# Patient Record
Sex: MALE | Race: WHITE | NOT HISPANIC OR LATINO | Employment: FULL TIME | URBAN - METROPOLITAN AREA
[De-identification: names, ages, dates, MRNs, and addresses within clinical notes are randomized per-mention and may not be internally consistent; named-entity substitution may affect disease eponyms.]

---

## 2018-08-27 ENCOUNTER — OFFICE VISIT (OUTPATIENT)
Dept: DERMATOLOGY | Facility: CLINIC | Age: 68
End: 2018-08-27
Payer: COMMERCIAL

## 2018-08-27 DIAGNOSIS — L81.4 LENTIGO: ICD-10-CM

## 2018-08-27 DIAGNOSIS — D18.00 ANGIOMA: ICD-10-CM

## 2018-08-27 DIAGNOSIS — L72.0 EPIDERMAL CYST: ICD-10-CM

## 2018-08-27 DIAGNOSIS — D48.5 NEOPLASM OF UNCERTAIN BEHAVIOR OF SKIN: Primary | ICD-10-CM

## 2018-08-27 DIAGNOSIS — L82.1 SK (SEBORRHEIC KERATOSIS): ICD-10-CM

## 2018-08-27 PROCEDURE — 88305 TISSUE EXAM BY PATHOLOGIST: CPT | Performed by: PATHOLOGY

## 2018-08-27 PROCEDURE — 11100 PR BIOPSY OF SKIN LESION: CPT | Mod: S$GLB,,, | Performed by: DERMATOLOGY

## 2018-08-27 PROCEDURE — 99999 PR PBB SHADOW E&M-EST. PATIENT-LVL III: CPT | Mod: PBBFAC,,, | Performed by: DERMATOLOGY

## 2018-08-27 PROCEDURE — 99203 OFFICE O/P NEW LOW 30 MIN: CPT | Mod: 25,S$GLB,, | Performed by: DERMATOLOGY

## 2018-08-27 PROCEDURE — 88342 IMHCHEM/IMCYTCHM 1ST ANTB: CPT | Mod: 26,,, | Performed by: PATHOLOGY

## 2018-08-27 NOTE — PROGRESS NOTES
"  Subjective:       Patient ID:  David Solomon is a 68 y.o. male who presents for   Chief Complaint   Patient presents with    Skin Check    Lesion     Pt here today for a UBSE. Pt c/o brown lesions on scalp x a few months. No bleeding, pain . tx with polysporin and did not resolve. Pt's wife wanted him to get it checked. Feels it started after trauma from a branch    Patient is here today for a "mole" check.   Pt has a history of normal sun exposure in the past.   Pt recalls several blistering sunburns in the past- no  Pt has history of tanning bed use- no  Pt has had moles removed in the past- no  Pt has history of melanoma in first degree relatives- no          Review of Systems   Skin: Positive for activity-related sunscreen use. Negative for daily sunscreen use, tendency to form keloidal scars and recent sunburn.   Hematologic/Lymphatic: Does not bruise/bleed easily.        Objective:    Physical Exam   Constitutional: He appears well-developed and well-nourished. No distress.   Neurological: He is alert and oriented to person, place, and time. He is not disoriented.   Psychiatric: He has a normal mood and affect.   Skin:   Areas Examined (abnormalities noted in diagram):   Scalp / Hair Palpated and Inspected  Head / Face Inspection Performed  Neck Inspection Performed  Chest / Axilla Inspection Performed  Abdomen Inspection Performed  Back Inspection Performed  RUE Inspected  LUE Inspection Performed  Nails and Digits Inspection Performed                   Diagram Legend     Erythematous scaling macule/papule c/w actinic keratosis       Vascular papule c/w angioma      Pigmented verrucoid papule/plaque c/w seborrheic keratosis      Yellow umbilicated papule c/w sebaceous hyperplasia      Irregularly shaped tan macule c/w lentigo     1-2 mm smooth white papules consistent with Milia      Movable subcutaneous cyst with punctum c/w epidermal inclusion cyst      Subcutaneous movable cyst c/w pilar cyst      Firm " pink to brown papule c/w dermatofibroma      Pedunculated fleshy papule(s) c/w skin tag(s)      Evenly pigmented macule c/w junctional nevus     Mildly variegated pigmented, slightly irregular-bordered macule c/w mildly atypical nevus      Flesh colored to evenly pigmented papule c/w intradermal nevus       Pink pearly papule/plaque c/w basal cell carcinoma      Erythematous hyperkeratotic cursted plaque c/w SCC      Surgical scar with no sign of skin cancer recurrence      Open and closed comedones      Inflammatory papules and pustules      Verrucoid papule consistent consistent with wart     Erythematous eczematous patches and plaques     Dystrophic onycholytic nail with subungual debris c/w onychomycosis     Umbilicated papule    Erythematous-base heme-crusted tan verrucoid plaque consistent with inflamed seborrheic keratosis     Erythematous Silvery Scaling Plaque c/w Psoriasis     See annotation          Assessment / Plan:      Pathology Orders:     Normal Orders This Visit    Tissue Specimen To Pathology, Dermatology     Questions:    Directional Terms:  Other(comment)    Clinical information:  r/o sk v atypical lentigo    Specific Site:  mid scalp        Neoplasm of uncertain behavior of skin  Shave biopsy procedure note:    Shave biopsy performed after verbal consent including risk of infection, scar, recurrence, need for additional treatment of site. Area prepped with alcohol, anesthetized with approximately 1.0cc of 1% lidocaine with epinephrine. Lesional tissue shaved with razor blade. Hemostasis achieved with application of aluminum chloride followed by hyfrecation. No complications. Dressing applied. Wound care explained.      -     Tissue Specimen To Pathology, Dermatology    Lentigo  This is a benign hyperpigmented sun induced lesion. Daily sun protection will reduce the number of new lesions. Treatment of these benign lesions are considered cosmetic.  The nature of sun-induced photo-aging and skin  cancers is discussed.  Sun avoidance, protective clothing, and the use of 30-SPF sunscreens is advised. Observe closely for skin damage/changes, and call if such occurs.    Angioma  These are benign vascular lesions that are inherited.  Treatment is not necessary.    SK (seborrheic keratosis)  These are benign inherited growths without a malignant potential. Reassurance given to patient. No treatment is necessary.     Epidermal cyst    Reassurance given to patient. No treatment is necessary.   Treatment of benign, asymptomatic lesions may be considered cosmetic.    Upper body skin examination performed today including at least 6 points as noted in physical examination. Suspicious lesions noted.           Follow-up for prn bx report.

## 2019-10-17 ENCOUNTER — OCCUPATIONAL HEALTH (OUTPATIENT)
Dept: URGENT CARE | Facility: CLINIC | Age: 69
End: 2019-10-17

## 2019-10-17 PROCEDURE — 90686 IIV4 VACC NO PRSV 0.5 ML IM: CPT | Mod: S$GLB,,, | Performed by: PREVENTIVE MEDICINE

## 2019-10-17 PROCEDURE — 90686 FLU VACCINE (QUAD) GREATER THAN OR EQUAL TO 3YO PRESERVATIVE FREE IM: ICD-10-PCS | Mod: S$GLB,,, | Performed by: PREVENTIVE MEDICINE

## 2020-05-25 ENCOUNTER — OFFICE VISIT (OUTPATIENT)
Dept: PAIN MEDICINE | Facility: CLINIC | Age: 70
End: 2020-05-25
Payer: COMMERCIAL

## 2020-05-25 ENCOUNTER — HOSPITAL ENCOUNTER (OUTPATIENT)
Dept: RADIOLOGY | Facility: HOSPITAL | Age: 70
Discharge: HOME | End: 2020-05-25
Attending: PAIN MEDICINE
Payer: COMMERCIAL

## 2020-05-25 VITALS
BODY MASS INDEX: 24.66 KG/M2 | HEART RATE: 74 BPM | WEIGHT: 167 LBS | DIASTOLIC BLOOD PRESSURE: 83 MMHG | SYSTOLIC BLOOD PRESSURE: 179 MMHG

## 2020-05-25 DIAGNOSIS — G89.29 CHRONIC PAIN OF LEFT KNEE: Primary | ICD-10-CM

## 2020-05-25 DIAGNOSIS — M22.2X2 PATELLOFEMORAL SYNDROME OF LEFT KNEE: ICD-10-CM

## 2020-05-25 DIAGNOSIS — G89.29 CHRONIC PAIN OF LEFT KNEE: ICD-10-CM

## 2020-05-25 DIAGNOSIS — M25.562 CHRONIC PAIN OF LEFT KNEE: ICD-10-CM

## 2020-05-25 DIAGNOSIS — M25.562 CHRONIC PAIN OF LEFT KNEE: Primary | ICD-10-CM

## 2020-05-25 PROCEDURE — 99204 OFFICE O/P NEW MOD 45 MIN: CPT | Mod: S$GLB,,, | Performed by: PAIN MEDICINE

## 2020-05-25 PROCEDURE — 73564 XR KNEE COMP 4 OR MORE VIEWS BILAT: ICD-10-PCS | Mod: 26,,, | Performed by: RADIOLOGY

## 2020-05-25 PROCEDURE — 1159F PR MEDICATION LIST DOCUMENTED IN MEDICAL RECORD: ICD-10-PCS | Mod: S$GLB,,, | Performed by: PAIN MEDICINE

## 2020-05-25 PROCEDURE — 1159F MED LIST DOCD IN RCRD: CPT | Mod: S$GLB,,, | Performed by: PAIN MEDICINE

## 2020-05-25 PROCEDURE — 73564 X-RAY EXAM KNEE 4 OR MORE: CPT | Mod: TC,50,FY

## 2020-05-25 PROCEDURE — 1125F AMNT PAIN NOTED PAIN PRSNT: CPT | Mod: S$GLB,,, | Performed by: PAIN MEDICINE

## 2020-05-25 PROCEDURE — 99999 PR PBB SHADOW E&M-EST. PATIENT-LVL III: ICD-10-PCS | Mod: PBBFAC,,, | Performed by: PAIN MEDICINE

## 2020-05-25 PROCEDURE — 73564 X-RAY EXAM KNEE 4 OR MORE: CPT | Mod: 26,,, | Performed by: RADIOLOGY

## 2020-05-25 PROCEDURE — 1125F PR PAIN SEVERITY QUANTIFIED, PAIN PRESENT: ICD-10-PCS | Mod: S$GLB,,, | Performed by: PAIN MEDICINE

## 2020-05-25 PROCEDURE — 99204 PR OFFICE/OUTPT VISIT, NEW, LEVL IV, 45-59 MIN: ICD-10-PCS | Mod: S$GLB,,, | Performed by: PAIN MEDICINE

## 2020-05-25 PROCEDURE — 99999 PR PBB SHADOW E&M-EST. PATIENT-LVL III: CPT | Mod: PBBFAC,,, | Performed by: PAIN MEDICINE

## 2020-05-25 NOTE — PROGRESS NOTES
[facility] New Patient Evaluation    Referred by: Self  Reason for referral: Knee pain    CC:   Chief Complaint   Patient presents with    Knee Pain     Last 3 PDI Scores 5/25/2020   Pain Disability Index (PDI) 35       HPI:   David Solomon is a 70 y.o. male who complains of intermittent left knee pain associated with flexion near 150 degrees.  He reports being very active- cycling, running, stair-climbing, etc- with mild irritation in the knee most mornings.  The knee pain is worse with flexion of the knee, especially when stretching.  Aside from the current left knee pain, he has a history of traumatic injury tot he right knee in 2018 which is not causing him an problems; the injury was mostly a superficial laceration.    Location: Left Knee   Onset: couple of years  Current Pain Score: 5/10  Daily Pain of Range: 0-3/10  Quality: Aching  Radiation: none  Worsened by: flexion  Improved by: nothing    Previous Therapies:  PT/OT: Denies  HEP:   Interventions: Denies  Surgery: Denies  Medications:   - NSAIDS:   - MSK Relaxants:   - TCAs:   - SNRIs:   - Topicals:   - Anticonvulsants:  - Opioids:     Current Pain Medications:  1. none     Review of Systems:  Review of Systems   Constitutional: Negative for chills and fever.   HENT: Negative for nosebleeds.    Eyes: Negative for blurred vision and pain.   Respiratory: Negative for cough, hemoptysis, sputum production, shortness of breath and wheezing.    Cardiovascular: Negative for chest pain and palpitations.   Gastrointestinal: Negative for heartburn, nausea and vomiting.   Genitourinary: Negative for dysuria and hematuria.   Musculoskeletal: Positive for joint pain. Negative for falls and myalgias.   Skin: Negative for rash.   Neurological: Negative for tingling, sensory change, focal weakness, seizures, loss of consciousness and weakness.   Endo/Heme/Allergies: Does not bruise/bleed easily.   Psychiatric/Behavioral: Negative for hallucinations.        History:    Current Outpatient Medications:     fosinopril (MONOPRIL) 40 MG tablet, , Disp: , Rfl:     insulin glargine (LANTUS) 100 unit/mL injection, Inject 23 Units into the skin every evening., Disp: , Rfl:     insulin glulisine U-100 (APIDRA SOLOSTAR U-100 INSULIN) 100 unit/mL InPn pen, , Disp: , Rfl:     triamterene-hydrochlorothiazide 37.5-25 mg (DYAZIDE) 37.5-25 mg per capsule, , Disp: , Rfl:     Past Medical History:   Diagnosis Date    Diabetes mellitus     Hypertension        No past surgical history on file.    Family History   Problem Relation Age of Onset    Melanoma Neg Hx        Social History     Socioeconomic History    Marital status:      Spouse name: Not on file    Number of children: Not on file    Years of education: Not on file    Highest education level: Not on file   Occupational History    Not on file   Social Needs    Financial resource strain: Not on file    Food insecurity:     Worry: Not on file     Inability: Not on file    Transportation needs:     Medical: Not on file     Non-medical: Not on file   Tobacco Use    Smoking status: Never Smoker    Smokeless tobacco: Never Used   Substance and Sexual Activity    Alcohol use: No    Drug use: No    Sexual activity: Not on file   Lifestyle    Physical activity:     Days per week: Not on file     Minutes per session: Not on file    Stress: Not on file   Relationships    Social connections:     Talks on phone: Not on file     Gets together: Not on file     Attends Latter-day service: Not on file     Active member of club or organization: Not on file     Attends meetings of clubs or organizations: Not on file     Relationship status: Not on file   Other Topics Concern    Not on file   Social History Narrative    Not on file       Review of patient's allergies indicates:  No Known Allergies    Physical Exam:  Vitals:    05/25/20 0818   Weight: 75.8 kg (167 lb)   PainSc:   5     General    Nursing note and  vitals reviewed.  Constitutional: He is oriented to person, place, and time. He appears well-developed and well-nourished. No distress.   HENT:   Head: Normocephalic and atraumatic.   Nose: Nose normal.   Eyes: Conjunctivae and EOM are normal. Pupils are equal, round, and reactive to light. Right eye exhibits no discharge. Left eye exhibits no discharge. No scleral icterus.   Neck: No JVD present.   Cardiovascular: Intact distal pulses.    Pulmonary/Chest: Effort normal. No respiratory distress.   Abdominal: He exhibits no distension.   Neurological: He is alert and oriented to person, place, and time. Coordination normal.   Psychiatric: He has a normal mood and affect. His behavior is normal. Judgment and thought content normal.     General Musculoskeletal Exam   Gait: normal       Right Knee Exam     Inspection   Scars: present    Left Knee Exam     Inspection   Erythema: absent  Scars: absent  Swelling: absent  Deformity: absent  Bruising: absent    Tenderness   Left knee tenderness location: non-tender throughout.    Crepitus   Left knee crepitus location: none.    Range of Motion   The patient has normal left knee ROM.  Extension: normal   Flexion: normal Left knee flexion: pain noted with flexion to 150+ degress with lunging stretch.     Tests   Patella   Passive Patellar Tilt: neutral  Patellar Tracking: normal  Patellar Grind: negative    Muscle Strength   Left Lower Extremity   Hip Abduction: 5/5   Quadriceps:  5/5   Hamstrin/5       Imaging:  None relevant to review at this time.      Labs:  BMP  Lab Results   Component Value Date     2017    K 3.6 2017     2017    CO2 30 (H) 2017    BUN 11 2017    CREATININE 1.16 2017    CALCIUM 9.5 2017    ANIONGAP 12 2017    ESTGFRAFRICA >60.0 2017    EGFRNONAA >60.0 2017     Lab Results   Component Value Date    ALT 31 2017    AST 30 2017    ALKPHOS 82 2017    BILITOT 0.8  03/17/2017       Assessment & Plan:  Problem List Items Addressed This Visit     Chronic pain of left knee - Primary    Relevant Orders    X-Ray Knee Complete 4 Or More Views Bilat    Patellofemoral syndrome of left knee          5/25/20 - David Solomon is a 70 y.o. male with chronic, mild, intermittent left knee pain in the region of the patella.  This may be an early sign of patellofemoral syndrome or arthritis, though I do not think interventions or oral medications would be appropriate at ths time given the minimal nature of the pain.  I have recommended an demonstrated a series of stretches for the quadriceps muscle and encouraged him to use a pre-existing prescription of diclofenac gel around the patella p.r.n..  I also ordered xrays of the knees today; bilateral xray was ordered to enable comparison of the left vs right.    Follow Up: RTC as needed    Arnold Gonsalves Jr, MD  Interventional Pain Medicine / Anesthesiology    Disclaimer: This note was partly generated using dictation software which may occasionally result in transcription errors.

## 2021-03-05 ENCOUNTER — IMMUNIZATION (OUTPATIENT)
Dept: FAMILY MEDICINE | Facility: CLINIC | Age: 71
End: 2021-03-05
Payer: COMMERCIAL

## 2021-03-05 DIAGNOSIS — Z23 NEED FOR VACCINATION: Primary | ICD-10-CM

## 2021-03-05 PROCEDURE — 91300 COVID-19, MRNA, LNP-S, PF, 30 MCG/0.3 ML DOSE VACCINE: CPT | Mod: PBBFAC | Performed by: NURSE ANESTHETIST, CERTIFIED REGISTERED

## 2021-03-26 ENCOUNTER — IMMUNIZATION (OUTPATIENT)
Dept: FAMILY MEDICINE | Facility: CLINIC | Age: 71
End: 2021-03-26
Payer: MEDICARE

## 2021-03-26 DIAGNOSIS — Z23 NEED FOR VACCINATION: Primary | ICD-10-CM

## 2021-03-26 PROCEDURE — 0002A COVID-19, MRNA, LNP-S, PF, 30 MCG/0.3 ML DOSE VACCINE: CPT | Mod: PBBFAC | Performed by: FAMILY MEDICINE

## 2021-03-26 PROCEDURE — 91300 COVID-19, MRNA, LNP-S, PF, 30 MCG/0.3 ML DOSE VACCINE: CPT | Mod: PBBFAC | Performed by: FAMILY MEDICINE

## 2021-04-14 ENCOUNTER — IMMUNIZATION (OUTPATIENT)
Dept: URGENT CARE | Facility: CLINIC | Age: 71
End: 2021-04-14
Payer: MEDICARE

## 2021-04-14 DIAGNOSIS — Z23 NEED FOR TD VACCINE: Primary | ICD-10-CM

## 2021-04-14 PROCEDURE — 90714 TD VACC NO PRESV 7 YRS+ IM: CPT | Mod: S$GLB,,, | Performed by: PHYSICIAN ASSISTANT

## 2021-04-14 PROCEDURE — 90714 TD VACCINE GREATER THAN OR EQUAL TO 7YO WITH PRESERVATIVE IM: ICD-10-PCS | Mod: S$GLB,,, | Performed by: PHYSICIAN ASSISTANT

## 2021-04-14 PROCEDURE — 90471 IMMUNIZATION ADMIN: CPT | Mod: S$GLB,,, | Performed by: PHYSICIAN ASSISTANT

## 2021-04-14 PROCEDURE — 90471 TD VACCINE GREATER THAN OR EQUAL TO 7YO WITH PRESERVATIVE IM: ICD-10-PCS | Mod: S$GLB,,, | Performed by: PHYSICIAN ASSISTANT

## 2022-01-13 ENCOUNTER — OFFICE VISIT (OUTPATIENT)
Dept: OPHTHALMOLOGY | Facility: CLINIC | Age: 72
End: 2022-01-13
Payer: MEDICARE

## 2022-01-13 ENCOUNTER — TELEPHONE (OUTPATIENT)
Dept: OPHTHALMOLOGY | Facility: CLINIC | Age: 72
End: 2022-01-13
Payer: MEDICARE

## 2022-01-13 DIAGNOSIS — E11.3393 MODERATE NONPROLIFERATIVE DIABETIC RETINOPATHY OF BOTH EYES WITHOUT MACULAR EDEMA ASSOCIATED WITH TYPE 2 DIABETES MELLITUS: ICD-10-CM

## 2022-01-13 DIAGNOSIS — Z13.9 SCREENING FOR UNSPECIFIED CONDITION: Primary | ICD-10-CM

## 2022-01-13 DIAGNOSIS — H25.11 NUCLEAR SCLEROTIC CATARACT OF RIGHT EYE: Primary | ICD-10-CM

## 2022-01-13 DIAGNOSIS — H25.12 NUCLEAR SCLEROTIC CATARACT OF LEFT EYE: ICD-10-CM

## 2022-01-13 PROCEDURE — 92136 OPHTHALMIC BIOMETRY: CPT | Mod: RT,S$GLB,, | Performed by: OPHTHALMOLOGY

## 2022-01-13 PROCEDURE — 92136 IOL MASTER - OD - RIGHT EYE: ICD-10-PCS | Mod: RT,S$GLB,, | Performed by: OPHTHALMOLOGY

## 2022-01-13 PROCEDURE — 99999 PR PBB SHADOW E&M-EST. PATIENT-LVL II: ICD-10-PCS | Mod: PBBFAC,,, | Performed by: OPHTHALMOLOGY

## 2022-01-13 PROCEDURE — 99204 PR OFFICE/OUTPT VISIT, NEW, LEVL IV, 45-59 MIN: ICD-10-PCS | Mod: S$GLB,,, | Performed by: OPHTHALMOLOGY

## 2022-01-13 PROCEDURE — 92134 CPTRZ OPH DX IMG PST SGM RTA: CPT | Mod: S$GLB,,, | Performed by: OPHTHALMOLOGY

## 2022-01-13 PROCEDURE — 99999 PR PBB SHADOW E&M-EST. PATIENT-LVL II: CPT | Mod: PBBFAC,,, | Performed by: OPHTHALMOLOGY

## 2022-01-13 PROCEDURE — 1126F PR PAIN SEVERITY QUANTIFIED, NO PAIN PRESENT: ICD-10-PCS | Mod: CPTII,S$GLB,, | Performed by: OPHTHALMOLOGY

## 2022-01-13 PROCEDURE — 99204 OFFICE O/P NEW MOD 45 MIN: CPT | Mod: S$GLB,,, | Performed by: OPHTHALMOLOGY

## 2022-01-13 PROCEDURE — 92134 POSTERIOR SEGMENT OCT RETINA (OCULAR COHERENCE TOMOGRAPHY)-BOTH EYES: ICD-10-PCS | Mod: S$GLB,,, | Performed by: OPHTHALMOLOGY

## 2022-01-13 PROCEDURE — 1126F AMNT PAIN NOTED NONE PRSNT: CPT | Mod: CPTII,S$GLB,, | Performed by: OPHTHALMOLOGY

## 2022-01-13 PROCEDURE — 1159F PR MEDICATION LIST DOCUMENTED IN MEDICAL RECORD: ICD-10-PCS | Mod: CPTII,S$GLB,, | Performed by: OPHTHALMOLOGY

## 2022-01-13 PROCEDURE — 1159F MED LIST DOCD IN RCRD: CPT | Mod: CPTII,S$GLB,, | Performed by: OPHTHALMOLOGY

## 2022-01-13 RX ORDER — PREDNISOLONE ACETATE-GATIFLOXACIN-BROMFENAC .75; 5; 1 MG/ML; MG/ML; MG/ML
1 SUSPENSION/ DROPS OPHTHALMIC 3 TIMES DAILY
Qty: 5 ML | Refills: 3 | Status: SHIPPED | OUTPATIENT
Start: 2022-01-13 | End: 2022-02-28 | Stop reason: SDUPTHER

## 2022-01-13 NOTE — PROGRESS NOTES
HPI     Cataract      Additional comments: Cataracts OU               Comments     Kimberly Solomon - did surgery on her 1-2 years. - Patients wife. / retina   specialist Dr. Zapata    Laser- Diabetic Retinopathy 20+ years ago OU  Cataracts OU   Diabetic retinopathy   Family History of glaucoma- Mother  SLT OU    Patient here for a cataract evaluation in both eyes, was told a while back   that he had cataracts by his retina specialist Dr. Zapata. Patients wife had   surgery with you 1-2 years ago, wanted to keep care with you. Vision had a   vail/film and things appear to be darker than before, night vision has   decreased and not as vivid anymore. Glare is bothersome     B.S controlled A1C has been below 7 for the past couple of years, has been   doing good.           Last edited by Divine Ricketts MD on 1/13/2022  1:52 PM. (History)            Assessment /Plan     For exam results, see Encounter Report.    Nuclear sclerotic cataract of right eye  -     prednisolon/gatiflox/bromfenac (PREDNISOL ACE-GATIFLOX-BROMFEN) 1-0.5-0.075 % DrpS; Apply 1 drop to eye 3 (three) times daily. Start 2 days before surgery in the operative eye, Three times a day.  Dispense: 5 mL; Refill: 3  -     IOL Master - OD - Right Eye    Nuclear sclerotic cataract of left eye    Moderate nonproliferative diabetic retinopathy of both eyes without macular edema associated with type 2 diabetes mellitus  -     Posterior Segment OCT Retina-Both eyes      Visually significant nuclear sclerotic cataract   - Interfering with activities of daily living.  Pt desires cataract surgery for [facility] rehabilitation.   - R/B/A discussed and pt agrees to proceed with surgery.   - IOL options discussed according to patient's goals and concomitant ocular pathology; and pt content with monofocal toric lens.    - Target: plano.    [id number] 19.5 OD range  ora    Patient has regular astigmatism that is visually significant and wishes to have an astigmatism correcting TORIC lens  placed, and agrees to the out of pocket cost of $1500 per eye    ([id number] 19.0 OS range)  ora    DBR  - s/p PRP OU  - overdue for f/up with Dr. Zapata, pt to schedule appt prior to cat sx.

## 2022-02-15 ENCOUNTER — OFFICE VISIT (OUTPATIENT)
Dept: OPHTHALMOLOGY | Facility: CLINIC | Age: 72
End: 2022-02-15
Payer: MEDICARE

## 2022-02-15 DIAGNOSIS — E11.3553 STABLE PROLIFERATIVE DIABETIC RETINOPATHY OF BOTH EYES ASSOCIATED WITH TYPE 2 DIABETES MELLITUS: Primary | ICD-10-CM

## 2022-02-15 DIAGNOSIS — H25.13 AGE-RELATED NUCLEAR CATARACT OF BOTH EYES: ICD-10-CM

## 2022-02-15 DIAGNOSIS — H43.813 VITREOUS DEGENERATION OF BOTH EYES: ICD-10-CM

## 2022-02-15 PROBLEM — H04.123 DRY EYE SYNDROME OF BOTH EYES: Status: ACTIVE | Noted: 2022-02-15

## 2022-02-15 PROCEDURE — 92134 OCT, RETINA - OU - BOTH EYES: ICD-10-PCS | Mod: S$GLB,,, | Performed by: OPHTHALMOLOGY

## 2022-02-15 PROCEDURE — 99999 PR PBB SHADOW E&M-EST. PATIENT-LVL II: CPT | Mod: PBBFAC,,, | Performed by: OPHTHALMOLOGY

## 2022-02-15 PROCEDURE — 92134 CPTRZ OPH DX IMG PST SGM RTA: CPT | Mod: S$GLB,,, | Performed by: OPHTHALMOLOGY

## 2022-02-15 PROCEDURE — 1101F PR PT FALLS ASSESS DOC 0-1 FALLS W/OUT INJ PAST YR: ICD-10-PCS | Mod: CPTII,S$GLB,, | Performed by: OPHTHALMOLOGY

## 2022-02-15 PROCEDURE — 1160F RVW MEDS BY RX/DR IN RCRD: CPT | Mod: CPTII,S$GLB,, | Performed by: OPHTHALMOLOGY

## 2022-02-15 PROCEDURE — 99214 PR OFFICE/OUTPT VISIT, EST, LEVL IV, 30-39 MIN: ICD-10-PCS | Mod: S$GLB,,, | Performed by: OPHTHALMOLOGY

## 2022-02-15 PROCEDURE — 99214 OFFICE O/P EST MOD 30 MIN: CPT | Mod: S$GLB,,, | Performed by: OPHTHALMOLOGY

## 2022-02-15 PROCEDURE — 1126F AMNT PAIN NOTED NONE PRSNT: CPT | Mod: CPTII,S$GLB,, | Performed by: OPHTHALMOLOGY

## 2022-02-15 PROCEDURE — 1101F PT FALLS ASSESS-DOCD LE1/YR: CPT | Mod: CPTII,S$GLB,, | Performed by: OPHTHALMOLOGY

## 2022-02-15 PROCEDURE — 1159F MED LIST DOCD IN RCRD: CPT | Mod: CPTII,S$GLB,, | Performed by: OPHTHALMOLOGY

## 2022-02-15 PROCEDURE — 1160F PR REVIEW ALL MEDS BY PRESCRIBER/CLIN PHARMACIST DOCUMENTED: ICD-10-PCS | Mod: CPTII,S$GLB,, | Performed by: OPHTHALMOLOGY

## 2022-02-15 PROCEDURE — 1159F PR MEDICATION LIST DOCUMENTED IN MEDICAL RECORD: ICD-10-PCS | Mod: CPTII,S$GLB,, | Performed by: OPHTHALMOLOGY

## 2022-02-15 PROCEDURE — 1126F PR PAIN SEVERITY QUANTIFIED, NO PAIN PRESENT: ICD-10-PCS | Mod: CPTII,S$GLB,, | Performed by: OPHTHALMOLOGY

## 2022-02-15 PROCEDURE — 3288F FALL RISK ASSESSMENT DOCD: CPT | Mod: CPTII,S$GLB,, | Performed by: OPHTHALMOLOGY

## 2022-02-15 PROCEDURE — 99999 PR PBB SHADOW E&M-EST. PATIENT-LVL II: ICD-10-PCS | Mod: PBBFAC,,, | Performed by: OPHTHALMOLOGY

## 2022-02-15 PROCEDURE — 3288F PR FALLS RISK ASSESSMENT DOCUMENTED: ICD-10-PCS | Mod: CPTII,S$GLB,, | Performed by: OPHTHALMOLOGY

## 2022-02-28 DIAGNOSIS — H25.11 NUCLEAR SCLEROTIC CATARACT OF RIGHT EYE: ICD-10-CM

## 2022-03-04 RX ORDER — PREDNISOLONE ACETATE-GATIFLOXACIN-BROMFENAC .75; 5; 1 MG/ML; MG/ML; MG/ML
1 SUSPENSION/ DROPS OPHTHALMIC 3 TIMES DAILY
Qty: 5 ML | Refills: 3 | Status: SHIPPED | OUTPATIENT
Start: 2022-03-04 | End: 2022-04-27 | Stop reason: SDUPTHER

## 2022-03-05 ENCOUNTER — PATIENT MESSAGE (OUTPATIENT)
Dept: SURGERY | Facility: OTHER | Age: 72
End: 2022-03-05
Payer: MEDICARE

## 2022-03-11 ENCOUNTER — TELEPHONE (OUTPATIENT)
Dept: OPHTHALMOLOGY | Facility: CLINIC | Age: 72
End: 2022-03-11
Payer: MEDICARE

## 2022-03-11 NOTE — TELEPHONE ENCOUNTER
Spoke to patient and made him aware packet would be sent next week       -TD ----- Message from Abby Martinez sent at 3/10/2022  4:18 PM CST -----    ----- Message -----  From: Ly Araiza  Sent: 3/10/2022   4:10 PM CST  To: Liliam ORELLANA Staff    Patient called to confirm his upcoming sx on March 27th.     He stated that he has not received paperwork in the mail for his sx as of yet. He wanted to confirm that it's been sent.     He is also inquiring about the covid test as well as scheduling the post op appointment.   Please call patient back at [phone].

## 2022-03-21 ENCOUNTER — PATIENT MESSAGE (OUTPATIENT)
Dept: SURGERY | Facility: HOSPITAL | Age: 72
End: 2022-03-21
Payer: MEDICARE

## 2022-03-23 NOTE — H&P
History    Chief complaint:  Painless progressive vision loss    Present Ilness/Diagnosis: Nuclear sclerotic Cataract    Past Medical History:  has a past medical history of Diabetes mellitus and Hypertension.    Family History/Social History: refer to chart    Allergies: Review of patient's allergies indicates:  No Known Allergies    Current Medications: No current facility-administered medications for this encounter.    Current Outpatient Medications:     fosinopril (MONOPRIL) 40 MG tablet, , Disp: , Rfl:     insulin glargine (LANTUS) 100 unit/mL injection, Inject 23 Units into the skin every evening., Disp: , Rfl:     insulin glulisine U-100 (APIDRA SOLOSTAR U-100 INSULIN) 100 unit/mL InPn pen, , Disp: , Rfl:     prednisolon/gatiflox/bromfenac (PREDNISOL ACE-GATIFLOX-BROMFEN) 1-0.5-0.075 % DrpS, Apply 1 drop to eye 3 (three) times daily. Start 2 days before surgery in the operative eye, Three times a day., Disp: 5 mL, Rfl: 3    triamterene-hydrochlorothiazide 37.5-25 mg (DYAZIDE) 37.5-25 mg per capsule, , Disp: , Rfl:     Physical Exam    BP: Vital signs stable  General: No apparent distress  HEENT: nuclear sclerotic cataract  Lungs: adequate respirations  Heart: + pulses  Abdomen: soft  Rectal/pelvic: deferred    Impression: Visually significant Cataract.    See previous clinic notes for surgical indications.    Plan: Phacoemulsification with implantation of Intraocular lens

## 2022-03-25 ENCOUNTER — TELEPHONE (OUTPATIENT)
Dept: OPHTHALMOLOGY | Facility: CLINIC | Age: 72
End: 2022-03-25
Payer: MEDICARE

## 2022-03-25 RX ORDER — HYDROCODONE BITARTRATE AND ACETAMINOPHEN 5; 325 MG/1; MG/1
1 TABLET ORAL EVERY 6 HOURS PRN
Status: ON HOLD | COMMUNITY
Start: 2022-02-14 | End: 2023-12-01 | Stop reason: HOSPADM

## 2022-03-25 RX ORDER — DILTIAZEM HYDROCHLORIDE EXTENDED-RELEASE TABLETS 180 MG/1
180 TABLET, EXTENDED RELEASE ORAL DAILY
COMMUNITY
Start: 2022-03-05

## 2022-03-25 RX ORDER — ATORVASTATIN CALCIUM 40 MG/1
40 TABLET, FILM COATED ORAL NIGHTLY
COMMUNITY
Start: 2022-02-15

## 2022-03-25 RX ORDER — INSULIN LISPRO 100 [IU]/ML
INJECTION, SOLUTION INTRAVENOUS; SUBCUTANEOUS
COMMUNITY
Start: 2022-02-15

## 2022-03-25 NOTE — TELEPHONE ENCOUNTER
Spoke to patient and gave arrival time for surgery on Sunday- 7:30AM. Also went over fasting instructions and reminded pt to start drops today    -TD

## 2022-03-27 ENCOUNTER — HOSPITAL ENCOUNTER (OUTPATIENT)
Facility: HOSPITAL | Age: 72
Discharge: HOME | End: 2022-03-27
Attending: OPHTHALMOLOGY | Admitting: OPHTHALMOLOGY
Payer: MEDICARE

## 2022-03-27 ENCOUNTER — ANESTHESIA (OUTPATIENT)
Dept: SURGERY | Facility: HOSPITAL | Age: 72
End: 2022-03-27
Payer: MEDICARE

## 2022-03-27 ENCOUNTER — ANESTHESIA EVENT (OUTPATIENT)
Dept: SURGERY | Facility: HOSPITAL | Age: 72
End: 2022-03-27
Payer: MEDICARE

## 2022-03-27 VITALS
TEMPERATURE: 99 F | OXYGEN SATURATION: 96 % | HEIGHT: 68 IN | DIASTOLIC BLOOD PRESSURE: 67 MMHG | SYSTOLIC BLOOD PRESSURE: 151 MMHG | RESPIRATION RATE: 16 BRPM | HEART RATE: 77 BPM | WEIGHT: 159 LBS | BODY MASS INDEX: 24.1 KG/M2

## 2022-03-27 DIAGNOSIS — H25.10 SENILE NUCLEAR SCLEROSIS: ICD-10-CM

## 2022-03-27 DIAGNOSIS — H25.13 AGE-RELATED NUCLEAR CATARACT OF BOTH EYES: Primary | ICD-10-CM

## 2022-03-27 LAB
POCT GLUCOSE: 111 MG/DL (ref 70–110)
POCT GLUCOSE: 119 MG/DL (ref 70–110)

## 2022-03-27 PROCEDURE — 25000003 PHARM REV CODE 250: Performed by: NURSE ANESTHETIST, CERTIFIED REGISTERED

## 2022-03-27 PROCEDURE — 66999 UNLISTED PX ANT SEGMENT EYE: CPT | Mod: CSM,RT,, | Performed by: OPHTHALMOLOGY

## 2022-03-27 PROCEDURE — 71000015 HC POSTOP RECOV 1ST HR: Performed by: OPHTHALMOLOGY

## 2022-03-27 PROCEDURE — D9220A PRA ANESTHESIA: ICD-10-PCS | Mod: CRNA,,, | Performed by: NURSE ANESTHETIST, CERTIFIED REGISTERED

## 2022-03-27 PROCEDURE — 63600175 PHARM REV CODE 636 W HCPCS: Performed by: NURSE ANESTHETIST, CERTIFIED REGISTERED

## 2022-03-27 PROCEDURE — 71000044 HC DOSC ROUTINE RECOVERY FIRST HOUR: Performed by: OPHTHALMOLOGY

## 2022-03-27 PROCEDURE — 25000003 PHARM REV CODE 250: Performed by: OPHTHALMOLOGY

## 2022-03-27 PROCEDURE — D9220A PRA ANESTHESIA: Mod: ANES,,, | Performed by: ANESTHESIOLOGY

## 2022-03-27 PROCEDURE — 63600175 PHARM REV CODE 636 W HCPCS: Performed by: OPHTHALMOLOGY

## 2022-03-27 PROCEDURE — 66982 XCAPSL CTRC RMVL CPLX WO ECP: CPT | Mod: RT,,, | Performed by: OPHTHALMOLOGY

## 2022-03-27 PROCEDURE — 82962 GLUCOSE BLOOD TEST: CPT | Performed by: OPHTHALMOLOGY

## 2022-03-27 PROCEDURE — 37000009 HC ANESTHESIA EA ADD 15 MINS: Performed by: OPHTHALMOLOGY

## 2022-03-27 PROCEDURE — 36000707: Performed by: OPHTHALMOLOGY

## 2022-03-27 PROCEDURE — D9220A PRA ANESTHESIA: ICD-10-PCS | Mod: ANES,,, | Performed by: ANESTHESIOLOGY

## 2022-03-27 PROCEDURE — V2787 ASTIGMATISM-CORRECT FUNCTION: HCPCS | Mod: WS | Performed by: OPHTHALMOLOGY

## 2022-03-27 PROCEDURE — 66982 PR REMOVAL, CATARACT, W/INSRT INTRAOC LENS, W/O ENDO CYCLO, CMPLX: ICD-10-PCS | Mod: RT,,, | Performed by: OPHTHALMOLOGY

## 2022-03-27 PROCEDURE — 66999 PR FEMTO TORIC: ICD-10-PCS | Mod: CSM,RT,, | Performed by: OPHTHALMOLOGY

## 2022-03-27 PROCEDURE — 25000003 PHARM REV CODE 250

## 2022-03-27 PROCEDURE — 37000008 HC ANESTHESIA 1ST 15 MINUTES: Performed by: OPHTHALMOLOGY

## 2022-03-27 PROCEDURE — 36000706: Performed by: OPHTHALMOLOGY

## 2022-03-27 PROCEDURE — D9220A PRA ANESTHESIA: Mod: CRNA,,, | Performed by: NURSE ANESTHETIST, CERTIFIED REGISTERED

## 2022-03-27 DEVICE — IMPLANTABLE DEVICE: Type: IMPLANTABLE DEVICE | Site: EYE | Status: FUNCTIONAL

## 2022-03-27 RX ORDER — MIDAZOLAM HYDROCHLORIDE 1 MG/ML
INJECTION, SOLUTION INTRAMUSCULAR; INTRAVENOUS
Status: DISCONTINUED | OUTPATIENT
Start: 2022-03-27 | End: 2022-03-27

## 2022-03-27 RX ORDER — PREDNISOLONE ACETATE 10 MG/ML
SUSPENSION/ DROPS OPHTHALMIC
Status: DISCONTINUED | OUTPATIENT
Start: 2022-03-27 | End: 2022-03-27 | Stop reason: HOSPADM

## 2022-03-27 RX ORDER — PROPARACAINE HYDROCHLORIDE 5 MG/ML
1 SOLUTION/ DROPS OPHTHALMIC
Status: DISPENSED | OUTPATIENT
Start: 2022-03-27

## 2022-03-27 RX ORDER — PHENYLEPHRINE HYDROCHLORIDE 25 MG/ML
1 SOLUTION/ DROPS OPHTHALMIC
Status: DISPENSED | OUTPATIENT
Start: 2022-03-27

## 2022-03-27 RX ORDER — ONDANSETRON 2 MG/ML
4 INJECTION INTRAMUSCULAR; INTRAVENOUS DAILY PRN
Status: DISCONTINUED | OUTPATIENT
Start: 2022-03-27 | End: 2022-03-27 | Stop reason: HOSPADM

## 2022-03-27 RX ORDER — PREDNISOLONE ACETATE 10 MG/ML
SUSPENSION/ DROPS OPHTHALMIC
Status: DISCONTINUED
Start: 2022-03-27 | End: 2022-03-27 | Stop reason: HOSPADM

## 2022-03-27 RX ORDER — LIDOCAINE HYDROCHLORIDE 40 MG/ML
INJECTION, SOLUTION RETROBULBAR
Status: DISCONTINUED
Start: 2022-03-27 | End: 2022-03-27 | Stop reason: HOSPADM

## 2022-03-27 RX ORDER — LIDOCAINE HYDROCHLORIDE 10 MG/ML
INJECTION, SOLUTION EPIDURAL; INFILTRATION; INTRACAUDAL; PERINEURAL
Status: DISCONTINUED | OUTPATIENT
Start: 2022-03-27 | End: 2022-03-27 | Stop reason: HOSPADM

## 2022-03-27 RX ORDER — PROCHLORPERAZINE EDISYLATE 5 MG/ML
5 INJECTION INTRAMUSCULAR; INTRAVENOUS EVERY 30 MIN PRN
Status: DISCONTINUED | OUTPATIENT
Start: 2022-03-27 | End: 2022-03-27 | Stop reason: HOSPADM

## 2022-03-27 RX ORDER — MOXIFLOXACIN 5 MG/ML
SOLUTION/ DROPS OPHTHALMIC
Status: DISCONTINUED | OUTPATIENT
Start: 2022-03-27 | End: 2022-03-27 | Stop reason: HOSPADM

## 2022-03-27 RX ORDER — ACETAMINOPHEN 325 MG/1
650 TABLET ORAL EVERY 4 HOURS PRN
Status: DISCONTINUED | OUTPATIENT
Start: 2022-03-27 | End: 2022-03-27 | Stop reason: HOSPADM

## 2022-03-27 RX ORDER — FENTANYL CITRATE 50 UG/ML
INJECTION, SOLUTION INTRAMUSCULAR; INTRAVENOUS
Status: DISCONTINUED | OUTPATIENT
Start: 2022-03-27 | End: 2022-03-27

## 2022-03-27 RX ORDER — LIDOCAINE HYDROCHLORIDE 10 MG/ML
INJECTION, SOLUTION EPIDURAL; INFILTRATION; INTRACAUDAL; PERINEURAL
Status: DISCONTINUED
Start: 2022-03-27 | End: 2022-03-27 | Stop reason: HOSPADM

## 2022-03-27 RX ORDER — MOXIFLOXACIN 5 MG/ML
1 SOLUTION/ DROPS OPHTHALMIC
Status: COMPLETED | OUTPATIENT
Start: 2022-03-27 | End: 2022-03-27

## 2022-03-27 RX ORDER — LIDOCAINE HYDROCHLORIDE 40 MG/ML
INJECTION, SOLUTION RETROBULBAR
Status: DISCONTINUED | OUTPATIENT
Start: 2022-03-27 | End: 2022-03-27 | Stop reason: HOSPADM

## 2022-03-27 RX ORDER — TROPICAMIDE 10 MG/ML
1 SOLUTION/ DROPS OPHTHALMIC
Status: DISPENSED | OUTPATIENT
Start: 2022-03-27

## 2022-03-27 RX ORDER — KETOROLAC TROMETHAMINE 5 MG/ML
1 SOLUTION OPHTHALMIC ONCE
Status: COMPLETED | OUTPATIENT
Start: 2022-03-27 | End: 2022-03-27

## 2022-03-27 RX ADMIN — MIDAZOLAM HYDROCHLORIDE 2 MG: 1 INJECTION, SOLUTION INTRAMUSCULAR; INTRAVENOUS at 10:03

## 2022-03-27 RX ADMIN — FENTANYL CITRATE 100 MCG: 50 INJECTION, SOLUTION INTRAMUSCULAR; INTRAVENOUS at 10:03

## 2022-03-27 RX ADMIN — TROPICAMIDE 1 DROP: 10 SOLUTION/ DROPS OPHTHALMIC at 08:03

## 2022-03-27 RX ADMIN — KETOROLAC TROMETHAMINE 1 DROP: 5 SOLUTION/ DROPS OPHTHALMIC at 08:03

## 2022-03-27 RX ADMIN — PHENYLEPHRINE HYDROCHLORIDE 1 DROP: 25 SOLUTION/ DROPS OPHTHALMIC at 08:03

## 2022-03-27 RX ADMIN — SODIUM CHLORIDE: 9 INJECTION, SOLUTION INTRAVENOUS at 09:03

## 2022-03-27 RX ADMIN — MOXIFLOXACIN 1 DROP: 5 SOLUTION/ DROPS OPHTHALMIC at 08:03

## 2022-03-27 RX ADMIN — PROPARACAINE HYDROCHLORIDE 1 DROP: 5 SOLUTION/ DROPS OPHTHALMIC at 08:03

## 2022-03-27 NOTE — OP NOTE
DATE OF PROCEDURE: 03/27/2022    SURGEON: GALINA LEYVA MD    PREOPERATIVE DIAGNOSIS:  1. Senile nuclear sclerotic cataract right eye. 2. 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 2. 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    IMPLANT:  [id number] 20.0     ANESTHESIA:  Topical and MAC    COMPLICATIONS: none    ESTIMATED BLOOD LOSS: <1cc    SPECIMENS: none    INDICATIONS FOR PROCEDURE:  This patient presented to the clinic with decreased vision in the right eye and was found to have a cataract.  The risks, benefits, and alternatives were discussed and the patient agreed to proceed with phacoemulsification and implantation of a lens in the right eye.     PROCEDURE IN DETAIL:  The patient was met in the preop holding area.  Consent was confirmed to be signed.  The operative site was marked.  The patient was brought into the operating room by the anesthesia team and placed under monitored anesthesia care.  The right eye was prepped and draped in a sterile ophthalmic fashion.  A Zonia speculum was placed into the right eye.   A paracentesis site was made and 1% preservative-free lidocaine was injected into the anterior chamber.  Viscoelastic  material was injected into the anterior chamber.  A keratome blade was used to make a clear corneal incision. The malyugin ring was inserted and positioned into place, assisting with pupillary dilation.  A cystotome was used to initiate the continuous curvilinear capsulorrhexis which was completed with Utrata forceps.  BSS on a hyman cannula was used to perform hydrodissection.  The phacoemulsification tip was introduced into the eye and the nucleus was removed in a standard divide-and-conquer fashion.  Remaining cortical material was  removed from the eye using irrigation-aspiration.  The capsular bag was filled with viscoelastic material and the intraocular lens was injected and positioned into place. The Malyugin ring was removed from the eye. Remaining viscoelastic material was removed from the eye using irrigation and aspiration.  The corneal wounds were hydrated.  The eye was filled to physiologic pressure. The wounds were found to be watertight. Drops of Vigamox and prednisilone were placed into the eye.  The eye was washed, dried, and shielded.  The patient tolerated the procedure well and knows to follow up with me tomorrow morning, sooner if needed      Intraoperative aberrometer (ORA) was used to confirm calculations.

## 2022-03-27 NOTE — ANESTHESIA POSTPROCEDURE EVALUATION
Anesthesia Post Evaluation    Patient: David Solomon    Procedure(s) Performed: Procedure(s) (LRB):  PHACOEMULSIFICATION, CATARACT (Right)  INSERTION, IOL PROSTHESIS (Right)    Final Anesthesia Type: MAC      Patient location during evaluation: PACU  Patient participation: Yes- Able to Participate  Level of consciousness: awake and alert  Post-procedure vital signs: reviewed and stable  Pain management: adequate  Airway patency: patent  RUBÉN mitigation strategies: Multimodal analgesia  PONV status at discharge: No PONV  Anesthetic complications: no      Cardiovascular status: stable  Respiratory status: unassisted and spontaneous ventilation  Hydration status: euvolemic  Follow-up not needed.          Vitals Value Taken Time   /67 03/27/22 1116   Temp 36.9 °C (98.5 °F) 03/27/22 1109   Pulse 77 03/27/22 1117   Resp 20 03/27/22 1115   SpO2 96 % 03/27/22 1117   Vitals shown include unvalidated device data.      No case tracking events are documented in the log.      Pain/Eduarda Score: Eduarda Score: 10 (3/27/2022 11:08 AM)

## 2022-03-27 NOTE — BRIEF OP NOTE
BRIEF DISCHARGE NOTE:    Date of discharge: 03/27/2022    Reason for hospitalization -  Cataract surgery     Final Diagnosis - Visually significant Cataract    Procedures and treatment provided - Status post phacoemulsification with placement of intraocular lens     Diet - Advance to regular as tolerated    Activity - as tolerated    Disposition at the end of the case - Good.    Discharge: to home    The patient tolerated the procedure well and knows to follow up with me tomorrow morning in the eye clinic, sooner if needed.    Patient and family instructions (as appropriate) - Given to patient on discharge    Divine Ricketts MD

## 2022-03-27 NOTE — ANESTHESIA PREPROCEDURE EVALUATION
03/27/2022  David Solomon is a 71 y.o., male.    Patient Active Problem List    Diagnosis Date Noted    Stable proliferative diabetic retinopathy of both eyes associated with type 2 diabetes mellitus 02/15/2022    Age-related nuclear cataract of both eyes 02/15/2022    Dry eye syndrome of both eyes 02/15/2022    Vitreous degeneration of both eyes 02/15/2022    Chronic pain of left knee 05/25/2020    Patellofemoral syndrome of left knee 05/25/2020         Pre-op Assessment    I have reviewed the Patient Summary Reports.     I have reviewed the Nursing Notes. I have reviewed the NPO Status.   I have reviewed the Medications.     Review of Systems  Anesthesia Hx:  No problems with previous Anesthesia    Social:  Social Alcohol Use, Non-Smoker    Cardiovascular:   Exercise tolerance: good Hypertension    Endocrine:   Diabetes (Hx of diabetic retionopathy), well controlled, type 2        Physical Exam  General: Well nourished, Cooperative and Alert    Airway:  Mallampati: II   Mouth Opening: Normal  Neck ROM: Normal ROM    Dental:  Caps / Implants        Anesthesia Plan  Type of Anesthesia, risks & benefits discussed:    Anesthesia Type: MAC  Intra-op Monitoring Plan: Standard ASA Monitors  Post Op Pain Control Plan: multimodal analgesia and IV/PO Opioids PRN  Induction:  IV  Informed Consent: Informed consent signed with the Patient and all parties understand the risks and agree with anesthesia plan.  All questions answered.   ASA Score: 2    Ready For Surgery From Anesthesia Perspective.     .

## 2022-03-27 NOTE — TRANSFER OF CARE
"Anesthesia Transfer of Care Note    Patient: David Solomon    Procedure(s) Performed: Procedure(s) (LRB):  PHACOEMULSIFICATION, CATARACT (Right)  INSERTION, IOL PROSTHESIS (Right)    Patient location: PACU    Anesthesia Type: general    Transport from OR: Transported from OR on room air with adequate spontaneous ventilation    Post pain: adequate analgesia    Post assessment: no apparent anesthetic complications and tolerated procedure well    Post vital signs: stable    Level of consciousness: awake and alert    Nausea/Vomiting: no nausea/vomiting    Complications: none    Transfer of care protocol was followed      Last vitals:   Visit Vitals  BP (!) 187/82 (BP Location: Left arm, Patient Position: Lying)   Pulse 72   Temp 36.8 °C (98.2 °F) (Temporal)   Resp 20   Ht 5' 8" (1.727 m)   Wt 72.1 kg (159 lb)   SpO2 99%   BMI 24.18 kg/m²     "

## 2022-03-27 NOTE — PROGRESS NOTES
PreOp complete. Surgery consent needs to state eye laterality on it. Anesthesia consent, H&P, and site chris needed. Patient's belongings placed in the locker.

## 2022-03-28 ENCOUNTER — OFFICE VISIT (OUTPATIENT)
Dept: OPHTHALMOLOGY | Facility: CLINIC | Age: 72
End: 2022-03-28
Payer: MEDICARE

## 2022-03-28 DIAGNOSIS — H25.12 NUCLEAR SCLEROTIC CATARACT OF LEFT EYE: ICD-10-CM

## 2022-03-28 DIAGNOSIS — H43.813 VITREOUS DEGENERATION OF BOTH EYES: ICD-10-CM

## 2022-03-28 DIAGNOSIS — Z98.41 STATUS POST CATARACT EXTRACTION AND INSERTION OF INTRAOCULAR LENS, RIGHT: Primary | ICD-10-CM

## 2022-03-28 DIAGNOSIS — Z96.1 STATUS POST CATARACT EXTRACTION AND INSERTION OF INTRAOCULAR LENS, RIGHT: Primary | ICD-10-CM

## 2022-03-28 DIAGNOSIS — E11.3393 MODERATE NONPROLIFERATIVE DIABETIC RETINOPATHY OF BOTH EYES WITHOUT MACULAR EDEMA ASSOCIATED WITH TYPE 2 DIABETES MELLITUS: ICD-10-CM

## 2022-03-28 PROCEDURE — 1159F MED LIST DOCD IN RCRD: CPT | Mod: CPTII,S$GLB,, | Performed by: OPHTHALMOLOGY

## 2022-03-28 PROCEDURE — 99999 PR PBB SHADOW E&M-EST. PATIENT-LVL II: CPT | Mod: PBBFAC,,, | Performed by: OPHTHALMOLOGY

## 2022-03-28 PROCEDURE — 1159F PR MEDICATION LIST DOCUMENTED IN MEDICAL RECORD: ICD-10-PCS | Mod: CPTII,S$GLB,, | Performed by: OPHTHALMOLOGY

## 2022-03-28 PROCEDURE — 99024 POSTOP FOLLOW-UP VISIT: CPT | Mod: S$GLB,,, | Performed by: OPHTHALMOLOGY

## 2022-03-28 PROCEDURE — 99999 PR PBB SHADOW E&M-EST. PATIENT-LVL II: ICD-10-PCS | Mod: PBBFAC,,, | Performed by: OPHTHALMOLOGY

## 2022-03-28 PROCEDURE — 99024 PR POST-OP FOLLOW-UP VISIT: ICD-10-PCS | Mod: S$GLB,,, | Performed by: OPHTHALMOLOGY

## 2022-03-28 NOTE — Clinical Note
-f/up 1-2wks, sooner PRN. Nicks  F/up me 4-6 wks, james to pencil in date for OS if not already done.

## 2022-03-30 NOTE — PROGRESS NOTES
HPI     New pt / I see pt's wife    S/P  Phaco IOL OD 3/27/22 - toric  Laser- Diabetic Retinopathy 20+ years ago OU   Cataracts OS  Diabetic retinopathy   Family History of glaucoma- Mother   SLT OU     C/o: No pain or discomfort last night did well.  Vision is good    Last edited by Divine Ricketts MD on 3/30/2022 11:24 AM. (History)            Assessment /Plan     For exam results, see Encounter Report.    Status post cataract extraction and insertion of intraocular lens, right    Nuclear sclerotic cataract of left eye    Vitreous degeneration of both eyes    Moderate nonproliferative diabetic retinopathy of both eyes without macular edema associated with type 2 diabetes mellitus      Slit Lamp Exam  L/L - normal  C/s - quiet  Cornea - clear  A/C - 1+ cell  Lens - PCIOL    POD #1 s/p phaco/IOL  - doing well  - continue the following drops:    vigamox or ocuflox TID x 1 wk then stop  Pred forte or durezol or dexamethasone TID x  4 wks  Ketorolac TID until runs out    Versus:    Combination drop - 1 drop TID x total of 1 month    Appropriate precautions and post op medications reviewed.  Patient instructed to call or come in if symptoms of redness, decreased vision, or pain are experienced.    -f/up 1-2wks, sooner PRN. Nicks    F/up me 4-6 wks, james to pencil in date for OS if not already done.

## 2022-04-08 ENCOUNTER — OFFICE VISIT (OUTPATIENT)
Dept: OPTOMETRY | Facility: CLINIC | Age: 72
End: 2022-04-08
Payer: MEDICARE

## 2022-04-08 DIAGNOSIS — Z96.1 PSEUDOPHAKIA OF RIGHT EYE: Primary | ICD-10-CM

## 2022-04-08 PROCEDURE — 1159F PR MEDICATION LIST DOCUMENTED IN MEDICAL RECORD: ICD-10-PCS | Mod: CPTII,S$GLB,, | Performed by: OPTOMETRIST

## 2022-04-08 PROCEDURE — 1126F PR PAIN SEVERITY QUANTIFIED, NO PAIN PRESENT: ICD-10-PCS | Mod: CPTII,S$GLB,, | Performed by: OPTOMETRIST

## 2022-04-08 PROCEDURE — 99999 PR PBB SHADOW E&M-EST. PATIENT-LVL II: CPT | Mod: PBBFAC,,, | Performed by: OPTOMETRIST

## 2022-04-08 PROCEDURE — 3288F FALL RISK ASSESSMENT DOCD: CPT | Mod: CPTII,S$GLB,, | Performed by: OPTOMETRIST

## 2022-04-08 PROCEDURE — 92002 INTRM OPH EXAM NEW PATIENT: CPT | Mod: S$GLB,,, | Performed by: OPTOMETRIST

## 2022-04-08 PROCEDURE — 1159F MED LIST DOCD IN RCRD: CPT | Mod: CPTII,S$GLB,, | Performed by: OPTOMETRIST

## 2022-04-08 PROCEDURE — 3288F PR FALLS RISK ASSESSMENT DOCUMENTED: ICD-10-PCS | Mod: CPTII,S$GLB,, | Performed by: OPTOMETRIST

## 2022-04-08 PROCEDURE — 1101F PR PT FALLS ASSESS DOC 0-1 FALLS W/OUT INJ PAST YR: ICD-10-PCS | Mod: CPTII,S$GLB,, | Performed by: OPTOMETRIST

## 2022-04-08 PROCEDURE — 92002 PR EYE EXAM, NEW PATIENT,INTERMED: ICD-10-PCS | Mod: S$GLB,,, | Performed by: OPTOMETRIST

## 2022-04-08 PROCEDURE — 99999 PR PBB SHADOW E&M-EST. PATIENT-LVL II: ICD-10-PCS | Mod: PBBFAC,,, | Performed by: OPTOMETRIST

## 2022-04-08 PROCEDURE — 1101F PT FALLS ASSESS-DOCD LE1/YR: CPT | Mod: CPTII,S$GLB,, | Performed by: OPTOMETRIST

## 2022-04-08 PROCEDURE — 1126F AMNT PAIN NOTED NONE PRSNT: CPT | Mod: CPTII,S$GLB,, | Performed by: OPTOMETRIST

## 2022-04-08 NOTE — PROGRESS NOTES
HPI     Post-op Evaluation      Additional comments: 1 wk phaco OD              Comments     Patient here for 1 week cataract removal p/o. Last eye exam was 1 day p/o   on 3/28/22 with Dr. Ricketts.  Patient states vision is better since cataract sx. No complaints. Ready to   proceed with surgery OS.     (+)gtts  PGB TID OD    03/27/2022 IMPLANT:  [id number] 20.0 OD          Last edited by Betty Briggs, OD on 4/8/2022  2:33 PM. (History)            Assessment /Plan     For exam results, see Encounter Report.    Pseudophakia of right eye      1 week cataract removal p/o OD. Good result. Patient asymptomatic.  Patient to continue combo gtt, 1 gtt OD TID. Reviewed no heavy lifting for 1 month after surgery.   Patient scheduled for 1 month post-op with Dr. Ricketts on 05/06/2022.  Patient to RTC ASAP with any complications. Messaged staff to have cataract removal OS scheduled.   Monitor 3-4 weeks.       RTC in 3-4 weeks for 1 week p/o or sooner if needed.

## 2022-04-27 ENCOUNTER — TELEPHONE (OUTPATIENT)
Dept: OPHTHALMOLOGY | Facility: CLINIC | Age: 72
End: 2022-04-27
Payer: MEDICARE

## 2022-04-27 DIAGNOSIS — H25.12 NUCLEAR SCLEROTIC CATARACT OF LEFT EYE: Primary | ICD-10-CM

## 2022-04-27 DIAGNOSIS — H25.11 NUCLEAR SCLEROTIC CATARACT OF RIGHT EYE: ICD-10-CM

## 2022-04-28 RX ORDER — PREDNISOLONE ACETATE-GATIFLOXACIN-BROMFENAC .75; 5; 1 MG/ML; MG/ML; MG/ML
1 SUSPENSION/ DROPS OPHTHALMIC 3 TIMES DAILY
Qty: 5 ML | Refills: 3 | Status: SHIPPED | OUTPATIENT
Start: 2022-04-28 | End: 2023-06-05

## 2022-05-03 ENCOUNTER — PATIENT MESSAGE (OUTPATIENT)
Dept: OPHTHALMOLOGY | Facility: CLINIC | Age: 72
End: 2022-05-03
Payer: MEDICARE

## 2022-05-06 ENCOUNTER — OFFICE VISIT (OUTPATIENT)
Dept: OPHTHALMOLOGY | Facility: CLINIC | Age: 72
End: 2022-05-06
Payer: MEDICARE

## 2022-05-06 DIAGNOSIS — E11.3393 MODERATE NONPROLIFERATIVE DIABETIC RETINOPATHY OF BOTH EYES WITHOUT MACULAR EDEMA ASSOCIATED WITH TYPE 2 DIABETES MELLITUS: ICD-10-CM

## 2022-05-06 DIAGNOSIS — Z98.41 STATUS POST CATARACT EXTRACTION AND INSERTION OF INTRAOCULAR LENS, RIGHT: Primary | ICD-10-CM

## 2022-05-06 DIAGNOSIS — H25.12 NUCLEAR SCLEROTIC CATARACT OF LEFT EYE: ICD-10-CM

## 2022-05-06 DIAGNOSIS — Z96.1 STATUS POST CATARACT EXTRACTION AND INSERTION OF INTRAOCULAR LENS, RIGHT: Primary | ICD-10-CM

## 2022-05-06 PROCEDURE — 1159F PR MEDICATION LIST DOCUMENTED IN MEDICAL RECORD: ICD-10-PCS | Mod: CPTII,S$GLB,, | Performed by: OPHTHALMOLOGY

## 2022-05-06 PROCEDURE — 1126F AMNT PAIN NOTED NONE PRSNT: CPT | Mod: CPTII,S$GLB,, | Performed by: OPHTHALMOLOGY

## 2022-05-06 PROCEDURE — 3288F PR FALLS RISK ASSESSMENT DOCUMENTED: ICD-10-PCS | Mod: CPTII,S$GLB,, | Performed by: OPHTHALMOLOGY

## 2022-05-06 PROCEDURE — 92136 OPHTHALMIC BIOMETRY: CPT | Mod: 26,LT,S$GLB, | Performed by: OPHTHALMOLOGY

## 2022-05-06 PROCEDURE — 3288F FALL RISK ASSESSMENT DOCD: CPT | Mod: CPTII,S$GLB,, | Performed by: OPHTHALMOLOGY

## 2022-05-06 PROCEDURE — 99024 PR POST-OP FOLLOW-UP VISIT: ICD-10-PCS | Mod: S$GLB,,, | Performed by: OPHTHALMOLOGY

## 2022-05-06 PROCEDURE — 1101F PT FALLS ASSESS-DOCD LE1/YR: CPT | Mod: CPTII,S$GLB,, | Performed by: OPHTHALMOLOGY

## 2022-05-06 PROCEDURE — 1159F MED LIST DOCD IN RCRD: CPT | Mod: CPTII,S$GLB,, | Performed by: OPHTHALMOLOGY

## 2022-05-06 PROCEDURE — 99024 POSTOP FOLLOW-UP VISIT: CPT | Mod: S$GLB,,, | Performed by: OPHTHALMOLOGY

## 2022-05-06 PROCEDURE — 92136 IOL MASTER - OU - BOTH EYES: ICD-10-PCS | Mod: 26,LT,S$GLB, | Performed by: OPHTHALMOLOGY

## 2022-05-06 PROCEDURE — 99999 PR PBB SHADOW E&M-EST. PATIENT-LVL II: ICD-10-PCS | Mod: PBBFAC,,, | Performed by: OPHTHALMOLOGY

## 2022-05-06 PROCEDURE — 99999 PR PBB SHADOW E&M-EST. PATIENT-LVL II: CPT | Mod: PBBFAC,,, | Performed by: OPHTHALMOLOGY

## 2022-05-06 PROCEDURE — 1126F PR PAIN SEVERITY QUANTIFIED, NO PAIN PRESENT: ICD-10-PCS | Mod: CPTII,S$GLB,, | Performed by: OPHTHALMOLOGY

## 2022-05-06 PROCEDURE — 1101F PR PT FALLS ASSESS DOC 0-1 FALLS W/OUT INJ PAST YR: ICD-10-PCS | Mod: CPTII,S$GLB,, | Performed by: OPHTHALMOLOGY

## 2022-05-06 NOTE — PROGRESS NOTES
HPI     New pt / I see pt's wife     S/P  Phaco IOL OD 3/27/22 - [facility]   Laser- Diabetic Retinopathy 20+ years ago OU   Cataracts OS   Diabetic retinopathy   Family History of glaucoma- Mother   SLT OU     PF Systane QID  Gel gtt QHS    03/27/2022 IMPLANT:  [id number] 20.0 OD    Patient here for 1 mo cataract removal p/o OD.   Patient states vision is better since cataract sx. No complaints. Ready to   proceed with surgery OS.     Last edited by Divine Ricketts MD on 5/6/2022  9:37 AM. (History)            Assessment /Plan     For exam results, see Encounter Report.    Status post cataract extraction and insertion of intraocular lens, right    Nuclear sclerotic cataract of left eye  -     IOL Master - OU - Both Eyes    Moderate nonproliferative diabetic retinopathy of both eyes without macular edema associated with type 2 diabetes mellitus      S/P  Phaco IOL OD 3/27/22 - [facility]     Doing well, c/b dry eye, alleviated with ATs. Samples given today      Visually significant nuclear sclerotic cataract   - Interfering with activities of daily living.  Pt desires cataract surgery for [facility] rehabilitation.   - R/B/A discussed and pt agrees to proceed with surgery.   - IOL options discussed according to patient's goals and concomitant ocular pathology; and pt content with monofocal toric lens.    - Target: plano.       Patient has regular astigmatism that is visually significant and wishes to have an astigmatism correcting TORIC lens placed, and agrees to the out of pocket cost of $1500 per eye    ([id number] 19.0 OS range)  ora    DBR  - s/p PRP OU  - overdue for f/up with Dr. Zapata, pt to schedule appt prior to cat sx.        Stable proliferative diabetic retinopathy of both eyes associated with type 2 diabetes mellitus  No recent A1C in system  Prev managed by Dr. Zapata, had laser 20+ yrs ago OU, no injection hx    OD: VA 20/40, PRP with room, some Focal scarring, inactive PDR without DME  Plan: Observation    OS: VA 20/25, PRP,  inactive PDR without DME, temporal scarring   Plan: Observation

## 2022-05-09 NOTE — H&P
History    Chief complaint:  Painless progressive vision loss    Present Ilness/Diagnosis: Nuclear sclerotic Cataract    Past Medical History:  has a past medical history of Cataract, Diabetes mellitus, and Hypertension.    Family History/Social History: refer to chart    Allergies: Review of patient's allergies indicates:  No Known Allergies    Current Medications: No current facility-administered medications for this encounter.    Current Outpatient Medications:     atorvastatin (LIPITOR) 40 MG tablet, Take 40 mg by mouth every evening., Disp: , Rfl:     diltiaZEM (CARDIZEM LA) 180 mg 24 hr tablet, Take 180 mg by mouth once daily., Disp: , Rfl:     fosinopril (MONOPRIL) 40 MG tablet, Take 40 mg by mouth nightly., Disp: , Rfl:     HUMALOG KWIKPEN INSULIN 100 unit/mL pen, Inject into the skin., Disp: , Rfl:     HYDROcodone-acetaminophen (NORCO) 5-325 mg per tablet, Take 1 tablet by mouth every 6 (six) hours as needed., Disp: , Rfl:     insulin glargine (LANTUS) 100 unit/mL injection, Inject 22 Units into the skin once daily., Disp: , Rfl:     insulin glulisine U-100 (APIDRA) 100 unit/mL InPn pen, , Disp: , Rfl:     prednisolon/gatiflox/bromfenac (PREDNISOL ACE-GATIFLOX-BROMFEN) 1-0.5-0.075 % DrpS, Apply 1 drop to eye 3 (three) times daily. Start 2 days before surgery in the operative eye, Three times a day., Disp: 5 mL, Rfl: 3    Facility-Administered Medications Ordered in Other Encounters:     phenylephrine HCL 2.5% ophthalmic solution 1 drop, 1 drop, Right Eye, On Call Procedure, Divine Ricketts MD, 1 drop at 03/27/22 0813    proparacaine 0.5 % ophthalmic solution 1 drop, 1 drop, Right Eye, On Call Procedure, Divine Ricketts MD, 1 drop at 03/27/22 0802    tropicamide 1% ophthalmic solution 1 drop, 1 drop, Right Eye, On Call Procedure, Divine Ricketts MD, 1 drop at 03/27/22 0813    Physical Exam    BP: Vital signs stable  General: No apparent distress  HEENT: nuclear sclerotic cataract  Lungs:  adequate respirations  Heart: + pulses  Abdomen: soft  Rectal/pelvic: deferred    Impression: Visually significant Cataract.    See previous clinic notes for surgical indications.    Plan: Phacoemulsification with implantation of Intraocular lens

## 2022-05-12 ENCOUNTER — ANESTHESIA EVENT (OUTPATIENT)
Dept: SURGERY | Facility: HOSPITAL | Age: 72
End: 2022-05-12
Payer: MEDICARE

## 2022-05-12 NOTE — ANESTHESIA PREPROCEDURE EVALUATION
05/12/2022  David Solomon is a 72 y.o., male.  Pre-operative evaluation for Procedure(s) (LRB):  PHACOEMULSIFICATION, CATARACT (Left)  INSERTION, IOL PROSTHESIS (Left)    David Solomon is a 72 y.o. male     Patient Active Problem List   Diagnosis    Chronic pain of left knee    Patellofemoral syndrome of left knee    Stable proliferative diabetic retinopathy of both eyes associated with type 2 diabetes mellitus    Age-related nuclear cataract of both eyes    Dry eye syndrome of both eyes    Vitreous degeneration of both eyes       Review of patient's allergies indicates:  No Known Allergies    Current Facility-Administered Medications on File Prior to Encounter   Medication Dose Route Frequency Provider Last Rate Last Admin    phenylephrine HCL 2.5% ophthalmic solution 1 drop  1 drop Right Eye On Call Procedure Divine Ricketts MD   1 drop at 03/27/22 0813    proparacaine 0.5 % ophthalmic solution 1 drop  1 drop Right Eye On Call Procedure Divine Ricketts MD   1 drop at 03/27/22 0802    tropicamide 1% ophthalmic solution 1 drop  1 drop Right Eye On Call Procedure Divine Ricketts MD   1 drop at 03/27/22 0813     Current Outpatient Medications on File Prior to Encounter   Medication Sig Dispense Refill    atorvastatin (LIPITOR) 40 MG tablet Take 40 mg by mouth every evening.      diltiaZEM (CARDIZEM LA) 180 mg 24 hr tablet Take 180 mg by mouth once daily.      fosinopril (MONOPRIL) 40 MG tablet Take 40 mg by mouth nightly.      HUMALOG KWIKPEN INSULIN 100 unit/mL pen Inject into the skin.      HYDROcodone-acetaminophen (NORCO) 5-325 mg per tablet Take 1 tablet by mouth every 6 (six) hours as needed.      insulin glargine (LANTUS) 100 unit/mL injection Inject 22 Units into the skin once daily.      insulin glulisine U-100 (APIDRA) 100 unit/mL InPn pen          Past Surgical History:    Procedure Laterality Date    CATARACT EXTRACTION Right 2022    Dr. Ricketts    INTRAOCULAR PROSTHESES INSERTION Right 3/27/2022    Procedure: INSERTION, IOL PROSTHESIS;  Surgeon: Divine Ricketts MD;  Location: [address];  Service: Ophthalmology;  Laterality: Right;    PHACOEMULSIFICATION OF CATARACT Right 3/27/2022    Procedure: PHACOEMULSIFICATION, CATARACT;  Surgeon: Divine Ricketts MD;  Location: [facility] OR [address] [place] [address];  Service: Ophthalmology;  Laterality: Right;       Social History     Socioeconomic History    Marital status:    Tobacco Use    Smoking status: Never Smoker    Smokeless tobacco: Never Used   Substance and Sexual Activity    Alcohol use: No    Drug use: No         CBC: No results for input(s): WBC, RBC, HGB, HCT, PLT, MCV, MCH, MCHC in the last 72 hours.    CMP: No results for input(s): NA, K, CL, CO2, BUN, CREATININE, GLU, MG, PHOS, CALCIUM, ALBUMIN, PROT, ALKPHOS, ALT, AST, BILITOT in the last 72 hours.    INR  No results for input(s): PT, INR, PROTIME, APTT in the last 72 hours.        Diagnostic Studies:      EKD Echo:  No results found for this or any previous visit.      Pre-op Assessment    I have reviewed the Patient Summary Reports.     I have reviewed the Nursing Notes. I have reviewed the NPO Status.   I have reviewed the Medications.     Review of Systems  Anesthesia Hx:  History of prior surgery of interest to airway management or planning: Denies Family Hx of Anesthesia complications.   Denies Personal Hx of Anesthesia complications.       Physical Exam  General: Well nourished    Airway:  Mallampati: II   Mouth Opening: Normal  TM Distance: Normal  Tongue: Normal  Neck ROM: Normal ROM    Chest/Lungs:  Normal Respiratory Rate    Heart:  Rate: Normal  Rhythm: Regular Rhythm        Anesthesia Plan  Type of Anesthesia, risks & benefits discussed:    Anesthesia Type: MAC  Intra-op Monitoring Plan: Standard ASA Monitors  Post Op Pain Control Plan: IV/PO  Opioids PRN  Induction:  IV  Airway Plan: Direct, Post-Induction  Informed Consent: Informed consent signed with the Patient and all parties understand the risks and agree with anesthesia plan.  All questions answered.   ASA Score: 2    Ready For Surgery From Anesthesia Perspective.     .

## 2022-05-13 ENCOUNTER — TELEPHONE (OUTPATIENT)
Dept: OPHTHALMOLOGY | Facility: CLINIC | Age: 72
End: 2022-05-13
Payer: MEDICARE

## 2022-05-13 NOTE — PRE-PROCEDURE INSTRUCTIONS
Preop instructions(bathing/wear loose fitting clothing/fasting/directions/location of surgery/ and preop medication instructions reviewed with patient). Clear liquids are allowed up to 2 hours before procedure.Clear liquids are:water,apple juice, jello, gatorade & powerade. Patient instructed to hold/stop all blood thinning medications, prior to surgery, following the pre-surgery recommended guidelines. Instructed to follow the surgeon's instructions if they differ from these.    Patient verbalized understanding.     Denies any history of side effects or issues with anesthesia or sedation.     Patient advised of the updated visitor policy.  Patient aware of the need to have someone drive them home following same -day surgery.       Patient given arrival time of 0900 to [facility]

## 2022-05-15 ENCOUNTER — HOSPITAL ENCOUNTER (OUTPATIENT)
Facility: HOSPITAL | Age: 72
Discharge: HOME | End: 2022-05-15
Attending: OPHTHALMOLOGY | Admitting: OPHTHALMOLOGY
Payer: MEDICARE

## 2022-05-15 ENCOUNTER — ANESTHESIA (OUTPATIENT)
Dept: SURGERY | Facility: HOSPITAL | Age: 72
End: 2022-05-15
Payer: MEDICARE

## 2022-05-15 VITALS
WEIGHT: 162 LBS | BODY MASS INDEX: 24.63 KG/M2 | OXYGEN SATURATION: 98 % | HEART RATE: 59 BPM | RESPIRATION RATE: 16 BRPM | TEMPERATURE: 98 F | SYSTOLIC BLOOD PRESSURE: 169 MMHG | DIASTOLIC BLOOD PRESSURE: 91 MMHG

## 2022-05-15 DIAGNOSIS — H25.10 SENILE NUCLEAR SCLEROSIS: ICD-10-CM

## 2022-05-15 DIAGNOSIS — H25.13 AGE-RELATED NUCLEAR CATARACT OF BOTH EYES: Primary | ICD-10-CM

## 2022-05-15 LAB — POCT GLUCOSE: 164 MG/DL (ref 70–110)

## 2022-05-15 PROCEDURE — 66984 PR REMOVAL, CATARACT, W/INSRT INTRAOC LENS, W/O ENDO CYCLO: ICD-10-PCS | Mod: 79,LT,, | Performed by: OPHTHALMOLOGY

## 2022-05-15 PROCEDURE — V2787 ASTIGMATISM-CORRECT FUNCTION: HCPCS | Performed by: OPHTHALMOLOGY

## 2022-05-15 PROCEDURE — 36000706: Performed by: OPHTHALMOLOGY

## 2022-05-15 PROCEDURE — D9220A PRA ANESTHESIA: ICD-10-PCS | Mod: ANES,,, | Performed by: ANESTHESIOLOGY

## 2022-05-15 PROCEDURE — 63600175 PHARM REV CODE 636 W HCPCS: Performed by: OPHTHALMOLOGY

## 2022-05-15 PROCEDURE — 66999 UNLISTED PX ANT SEGMENT EYE: CPT | Mod: CSM,LT,, | Performed by: OPHTHALMOLOGY

## 2022-05-15 PROCEDURE — 25000003 PHARM REV CODE 250: Performed by: OPHTHALMOLOGY

## 2022-05-15 PROCEDURE — 66999 PR FEMTO TORIC: ICD-10-PCS | Mod: CSM,LT,, | Performed by: OPHTHALMOLOGY

## 2022-05-15 PROCEDURE — 63600175 PHARM REV CODE 636 W HCPCS: Performed by: NURSE ANESTHETIST, CERTIFIED REGISTERED

## 2022-05-15 PROCEDURE — 66984 XCAPSL CTRC RMVL W/O ECP: CPT | Mod: 79,LT,, | Performed by: OPHTHALMOLOGY

## 2022-05-15 PROCEDURE — 71000044 HC DOSC ROUTINE RECOVERY FIRST HOUR: Performed by: OPHTHALMOLOGY

## 2022-05-15 PROCEDURE — 36000707: Performed by: OPHTHALMOLOGY

## 2022-05-15 PROCEDURE — D9220A PRA ANESTHESIA: Mod: ANES,,, | Performed by: ANESTHESIOLOGY

## 2022-05-15 PROCEDURE — 25000003 PHARM REV CODE 250

## 2022-05-15 PROCEDURE — 25000003 PHARM REV CODE 250: Performed by: NURSE ANESTHETIST, CERTIFIED REGISTERED

## 2022-05-15 PROCEDURE — 71000015 HC POSTOP RECOV 1ST HR: Performed by: OPHTHALMOLOGY

## 2022-05-15 PROCEDURE — D9220A PRA ANESTHESIA: Mod: CRNA,,, | Performed by: NURSE ANESTHETIST, CERTIFIED REGISTERED

## 2022-05-15 PROCEDURE — 82962 GLUCOSE BLOOD TEST: CPT | Performed by: OPHTHALMOLOGY

## 2022-05-15 PROCEDURE — 37000009 HC ANESTHESIA EA ADD 15 MINS: Performed by: OPHTHALMOLOGY

## 2022-05-15 PROCEDURE — 37000008 HC ANESTHESIA 1ST 15 MINUTES: Performed by: OPHTHALMOLOGY

## 2022-05-15 PROCEDURE — D9220A PRA ANESTHESIA: ICD-10-PCS | Mod: CRNA,,, | Performed by: NURSE ANESTHETIST, CERTIFIED REGISTERED

## 2022-05-15 DEVICE — IMPLANTABLE DEVICE: Type: IMPLANTABLE DEVICE | Site: EYE | Status: FUNCTIONAL

## 2022-05-15 RX ORDER — KETOROLAC TROMETHAMINE 5 MG/ML
1 SOLUTION OPHTHALMIC ONCE
Status: COMPLETED | OUTPATIENT
Start: 2022-05-15 | End: 2022-05-15

## 2022-05-15 RX ORDER — PREDNISOLONE ACETATE 10 MG/ML
SUSPENSION/ DROPS OPHTHALMIC
Status: DISCONTINUED
Start: 2022-05-15 | End: 2022-05-15 | Stop reason: HOSPADM

## 2022-05-15 RX ORDER — ACETAMINOPHEN 325 MG/1
650 TABLET ORAL EVERY 4 HOURS PRN
Status: DISCONTINUED | OUTPATIENT
Start: 2022-05-15 | End: 2022-05-15 | Stop reason: HOSPADM

## 2022-05-15 RX ORDER — SODIUM CHLORIDE 0.9 % (FLUSH) 0.9 %
10 SYRINGE (ML) INJECTION
Status: DISCONTINUED | OUTPATIENT
Start: 2022-05-15 | End: 2022-05-15 | Stop reason: HOSPADM

## 2022-05-15 RX ORDER — LIDOCAINE HYDROCHLORIDE 20 MG/ML
INJECTION, SOLUTION EPIDURAL; INFILTRATION; INTRACAUDAL; PERINEURAL
Status: DISCONTINUED | OUTPATIENT
Start: 2022-05-15 | End: 2022-05-15 | Stop reason: HOSPADM

## 2022-05-15 RX ORDER — PHENYLEPHRINE HYDROCHLORIDE 25 MG/ML
1 SOLUTION/ DROPS OPHTHALMIC
Status: DISPENSED | OUTPATIENT
Start: 2022-05-15

## 2022-05-15 RX ORDER — TROPICAMIDE 10 MG/ML
1 SOLUTION/ DROPS OPHTHALMIC
Status: DISPENSED | OUTPATIENT
Start: 2022-05-15

## 2022-05-15 RX ORDER — LIDOCAINE HYDROCHLORIDE 10 MG/ML
INJECTION, SOLUTION EPIDURAL; INFILTRATION; INTRACAUDAL; PERINEURAL
Status: DISCONTINUED
Start: 2022-05-15 | End: 2022-05-15 | Stop reason: HOSPADM

## 2022-05-15 RX ORDER — MOXIFLOXACIN 5 MG/ML
SOLUTION/ DROPS OPHTHALMIC
Status: DISCONTINUED | OUTPATIENT
Start: 2022-05-15 | End: 2022-05-15 | Stop reason: HOSPADM

## 2022-05-15 RX ORDER — MOXIFLOXACIN 5 MG/ML
1 SOLUTION/ DROPS OPHTHALMIC
Status: COMPLETED | OUTPATIENT
Start: 2022-05-15 | End: 2022-05-15

## 2022-05-15 RX ORDER — MIDAZOLAM HYDROCHLORIDE 1 MG/ML
INJECTION INTRAMUSCULAR; INTRAVENOUS
Status: DISCONTINUED | OUTPATIENT
Start: 2022-05-15 | End: 2022-05-15

## 2022-05-15 RX ORDER — MOXIFLOXACIN 5 MG/ML
SOLUTION/ DROPS OPHTHALMIC
Status: DISCONTINUED
Start: 2022-05-15 | End: 2022-05-15 | Stop reason: HOSPADM

## 2022-05-15 RX ORDER — PROPARACAINE HYDROCHLORIDE 5 MG/ML
1 SOLUTION/ DROPS OPHTHALMIC
Status: DISPENSED | OUTPATIENT
Start: 2022-05-15

## 2022-05-15 RX ORDER — LIDOCAINE HYDROCHLORIDE 10 MG/ML
INJECTION, SOLUTION EPIDURAL; INFILTRATION; INTRACAUDAL; PERINEURAL
Status: DISCONTINUED | OUTPATIENT
Start: 2022-05-15 | End: 2022-05-15 | Stop reason: HOSPADM

## 2022-05-15 RX ORDER — FENTANYL CITRATE 50 UG/ML
INJECTION, SOLUTION INTRAMUSCULAR; INTRAVENOUS
Status: DISCONTINUED | OUTPATIENT
Start: 2022-05-15 | End: 2022-05-15

## 2022-05-15 RX ORDER — PREDNISOLONE ACETATE 10 MG/ML
SUSPENSION/ DROPS OPHTHALMIC
Status: DISCONTINUED | OUTPATIENT
Start: 2022-05-15 | End: 2022-05-15 | Stop reason: HOSPADM

## 2022-05-15 RX ADMIN — PHENYLEPHRINE HYDROCHLORIDE 1 DROP: 25 SOLUTION/ DROPS OPHTHALMIC at 09:05

## 2022-05-15 RX ADMIN — PROPARACAINE HYDROCHLORIDE 1 DROP: 5 SOLUTION/ DROPS OPHTHALMIC at 09:05

## 2022-05-15 RX ADMIN — KETOROLAC TROMETHAMINE 1 DROP: 5 SOLUTION/ DROPS OPHTHALMIC at 09:05

## 2022-05-15 RX ADMIN — FENTANYL CITRATE 25 MCG: 50 INJECTION, SOLUTION INTRAMUSCULAR; INTRAVENOUS at 11:05

## 2022-05-15 RX ADMIN — TROPICAMIDE 1 DROP: 10 SOLUTION/ DROPS OPHTHALMIC at 09:05

## 2022-05-15 RX ADMIN — MOXIFLOXACIN 1 DROP: 5 SOLUTION/ DROPS OPHTHALMIC at 09:05

## 2022-05-15 RX ADMIN — MIDAZOLAM HYDROCHLORIDE 1 MG: 1 INJECTION, SOLUTION INTRAMUSCULAR; INTRAVENOUS at 11:05

## 2022-05-15 RX ADMIN — SODIUM CHLORIDE: 0.9 INJECTION, SOLUTION INTRAVENOUS at 11:05

## 2022-05-15 NOTE — OP NOTE
DATE OF PROCEDURE: 05/15/2022    SURGEON: GALINA LEYVA MD    PREOPERATIVE DIAGNOSIS:  1. Senile nuclear sclerotic cataract left eye. 2. Astigmatism left eye.    POSTOPERATIVE DIAGNOSIS: Senile nuclear sclerotic cataract left eye. 2. Astigmatism left eye.    PROCEDURE PERFORMED:  Phacoemulsification with placement of TORIC intraocular lens, left eye.    IMPLANT:  [id number] POWER 20.0    ANESTHESIA:  Topical and MAC    COMPLICATIONS: none    ESTIMATED BLOOD LOSS: <1cc    SPECIMENS: none    INDICATIONS FOR PROCEDURE:  This patient presented to the clinic with decreased vision in the left eye and was found to have a cataract.  The risks, benefits, and alternatives were discussed and the patient agreed to proceed with phacoemulsification and implantation of a lens in the left eye. A TORIC IOL was decided upon to mitigate the patient's pre-existing astigmatism.     PROCEDURE IN DETAIL:  The patient was met in the preop holding area.  Consent was confirmed to be signed.  The operative site was marked.  The patient was brought into the operating room by the anesthesia team and placed under monitored anesthesia care.  The left eye was marked at 3 and 9 o'clock with the patient upright prior to prepping the patient. Then the left eye was prepped and draped in a sterile ophthalmic fashion.  A Zonia speculum was placed into the left eye.  The appropriate axis was marked at the corneal limbus for the TORIC IOL lens position.  A paracentesis site was made and 1% preservative-free lidocaine was injected into the anterior chamber.  Viscoelastic  material was injected into the anterior chamber.  A keratome blade was used to make a clear corneal incision.  A cystotome was used to initiate the continuous curvilinear capsulorrhexis which was completed with Utrata forceps.  BSS on a hyman cannula was used to perform hydrodissection.  The phacoemulsification tip was introduced into the eye and the nucleus was removed in a standard  divide-and-conquer fashion.  Remaining cortical material was removed from the eye using irrigation-aspiration.  The capsular bag was filled with viscoelastic material and the intraocular lens was injected and positioned into place, aligning the correct axis.. Remaining viscoelastic material was removed from the eye using irrigation and aspiration.  The corneal wounds were hydrated.  The eye was filled to physiologic pressure. The wounds were found to be watertight. Drops of Vigamox and prednisilone were placed into the eye.  The eye was washed, dried, and shielded.  The patient tolerated the procedure well and knows to follow up with me tomorrow morning, sooner if needed.    Intraoperative aberrometer (ORA) was used to confirm calculations.

## 2022-05-15 NOTE — ANESTHESIA POSTPROCEDURE EVALUATION
Anesthesia Post Evaluation    Patient: David Solomon    Procedure(s) Performed: Procedure(s) (LRB):  PHACOEMULSIFICATION, CATARACT (Left)  INSERTION, IOL PROSTHESIS (Left)    Final Anesthesia Type: MAC      Patient location during evaluation: PACU  Patient participation: Yes- Able to Participate  Level of consciousness: awake and alert  Post-procedure vital signs: reviewed and stable  Pain management: adequate  Airway patency: patent    PONV status at discharge: No PONV  Anesthetic complications: no      Cardiovascular status: blood pressure returned to baseline  Respiratory status: spontaneous ventilation  Hydration status: euvolemic  Follow-up not needed.          Vitals Value Taken Time   /91 05/15/22 1217   Temp 36.6 °C (97.9 °F) 05/15/22 1155   Pulse 60 05/15/22 1222   Resp 16 05/15/22 1200   SpO2 99 % 05/15/22 1222   Vitals shown include unvalidated device data.      No case tracking events are documented in the log.      Pain/Eduarda Score: No data recorded

## 2022-05-15 NOTE — TRANSFER OF CARE
Anesthesia Transfer of Care Note    Patient: David Solomon    Procedure(s) Performed: Procedure(s) (LRB):  PHACOEMULSIFICATION, CATARACT (Left)  INSERTION, IOL PROSTHESIS (Left)    Patient location: [facility]    Anesthesia Type: MAC    Transport from OR: Transported from OR on room air with adequate spontaneous ventilation    Post pain: adequate analgesia    Post assessment: no apparent anesthetic complications    Post vital signs: stable    Level of consciousness: awake and alert    Nausea/Vomiting: no nausea/vomiting    Complications: none    Transfer of care protocol was followed      Last vitals:   Visit Vitals  BP (!) 205/95 (BP Location: Left arm, Patient Position: Lying)   Pulse 71   Temp 36.7 °C (98.1 °F) (Skin)   Resp 17   Wt 73.5 kg (162 lb)   SpO2 100%   BMI 24.63 kg/m²

## 2022-05-16 ENCOUNTER — OFFICE VISIT (OUTPATIENT)
Dept: OPHTHALMOLOGY | Facility: CLINIC | Age: 72
End: 2022-05-16
Payer: MEDICARE

## 2022-05-16 DIAGNOSIS — Z96.1 STATUS POST CATARACT EXTRACTION AND INSERTION OF INTRAOCULAR LENS, RIGHT: ICD-10-CM

## 2022-05-16 DIAGNOSIS — Z96.1 STATUS POST CATARACT EXTRACTION AND INSERTION OF INTRAOCULAR LENS, LEFT: Primary | ICD-10-CM

## 2022-05-16 DIAGNOSIS — Z98.42 STATUS POST CATARACT EXTRACTION AND INSERTION OF INTRAOCULAR LENS, LEFT: Primary | ICD-10-CM

## 2022-05-16 DIAGNOSIS — Z98.41 STATUS POST CATARACT EXTRACTION AND INSERTION OF INTRAOCULAR LENS, RIGHT: ICD-10-CM

## 2022-05-16 PROCEDURE — 99024 PR POST-OP FOLLOW-UP VISIT: ICD-10-PCS | Mod: S$GLB,,, | Performed by: OPHTHALMOLOGY

## 2022-05-16 PROCEDURE — 1101F PR PT FALLS ASSESS DOC 0-1 FALLS W/OUT INJ PAST YR: ICD-10-PCS | Mod: CPTII,S$GLB,, | Performed by: OPHTHALMOLOGY

## 2022-05-16 PROCEDURE — 99024 POSTOP FOLLOW-UP VISIT: CPT | Mod: S$GLB,,, | Performed by: OPHTHALMOLOGY

## 2022-05-16 PROCEDURE — 3288F FALL RISK ASSESSMENT DOCD: CPT | Mod: CPTII,S$GLB,, | Performed by: OPHTHALMOLOGY

## 2022-05-16 PROCEDURE — 1159F PR MEDICATION LIST DOCUMENTED IN MEDICAL RECORD: ICD-10-PCS | Mod: CPTII,S$GLB,, | Performed by: OPHTHALMOLOGY

## 2022-05-16 PROCEDURE — 1101F PT FALLS ASSESS-DOCD LE1/YR: CPT | Mod: CPTII,S$GLB,, | Performed by: OPHTHALMOLOGY

## 2022-05-16 PROCEDURE — 3288F PR FALLS RISK ASSESSMENT DOCUMENTED: ICD-10-PCS | Mod: CPTII,S$GLB,, | Performed by: OPHTHALMOLOGY

## 2022-05-16 PROCEDURE — 1126F PR PAIN SEVERITY QUANTIFIED, NO PAIN PRESENT: ICD-10-PCS | Mod: CPTII,S$GLB,, | Performed by: OPHTHALMOLOGY

## 2022-05-16 PROCEDURE — 99999 PR PBB SHADOW E&M-EST. PATIENT-LVL II: ICD-10-PCS | Mod: PBBFAC,,, | Performed by: OPHTHALMOLOGY

## 2022-05-16 PROCEDURE — 99999 PR PBB SHADOW E&M-EST. PATIENT-LVL II: CPT | Mod: PBBFAC,,, | Performed by: OPHTHALMOLOGY

## 2022-05-16 PROCEDURE — 1126F AMNT PAIN NOTED NONE PRSNT: CPT | Mod: CPTII,S$GLB,, | Performed by: OPHTHALMOLOGY

## 2022-05-16 PROCEDURE — 1159F MED LIST DOCD IN RCRD: CPT | Mod: CPTII,S$GLB,, | Performed by: OPHTHALMOLOGY

## 2022-05-16 NOTE — PROGRESS NOTES
HPI     I see pt's wife    S/P  Phaco IOL OD 3/27/22 - toric  S/P Phaco IOL OS 5/15/22 - toric  Laser- Diabetic Retinopathy 20+ years ago OU   Diabetic retinopathy   Family History of glaucoma- Mother   SLT OU     C/o: No pain or discomfort last night did well.  Vision is good    Last edited by Divien Ricketts MD on 5/16/2022  1:35 PM. (History)            Assessment /Plan     For exam results, see Encounter Report.    Status post cataract extraction and insertion of intraocular lens, left    Status post cataract extraction and insertion of intraocular lens, right      Slit Lamp Exam  L/L - normal  C/s - quiet  Cornea - clear  A/C - 1+ cell  Lens - PCIOL    POD #1 s/p phaco/IOL  - doing well  - continue the following drops:    vigamox or ocuflox TID x 1 wk then stop  Pred forte or durezol or dexamethasone TID x  4 wks  Ketorolac TID until runs out    Versus:    Combination drop - 1 drop TID x total of 1 month    Appropriate precautions and post op medications reviewed.  Patient instructed to call or come in if symptoms of redness, decreased vision, or pain are experienced.    -f/up 4 wks, sooner PRN. Then back to Dr. Zapata for DM retinal exams.

## 2022-06-17 ENCOUNTER — PATIENT MESSAGE (OUTPATIENT)
Dept: OPHTHALMOLOGY | Facility: CLINIC | Age: 72
End: 2022-06-17
Payer: MEDICARE

## 2022-06-24 ENCOUNTER — OFFICE VISIT (OUTPATIENT)
Dept: OPHTHALMOLOGY | Facility: CLINIC | Age: 72
End: 2022-06-24
Payer: MEDICARE

## 2022-06-24 DIAGNOSIS — E11.3553 STABLE PROLIFERATIVE DIABETIC RETINOPATHY OF BOTH EYES ASSOCIATED WITH TYPE 2 DIABETES MELLITUS: ICD-10-CM

## 2022-06-24 DIAGNOSIS — Z96.1 STATUS POST CATARACT EXTRACTION AND INSERTION OF INTRAOCULAR LENS, RIGHT: Primary | ICD-10-CM

## 2022-06-24 DIAGNOSIS — Z98.41 STATUS POST CATARACT EXTRACTION AND INSERTION OF INTRAOCULAR LENS, RIGHT: Primary | ICD-10-CM

## 2022-06-24 DIAGNOSIS — Z98.42 STATUS POST CATARACT EXTRACTION AND INSERTION OF INTRAOCULAR LENS, LEFT: ICD-10-CM

## 2022-06-24 DIAGNOSIS — Z96.1 STATUS POST CATARACT EXTRACTION AND INSERTION OF INTRAOCULAR LENS, LEFT: ICD-10-CM

## 2022-06-24 PROCEDURE — 99999 PR PBB SHADOW E&M-EST. PATIENT-LVL II: ICD-10-PCS | Mod: PBBFAC,,, | Performed by: OPHTHALMOLOGY

## 2022-06-24 PROCEDURE — 1159F PR MEDICATION LIST DOCUMENTED IN MEDICAL RECORD: ICD-10-PCS | Mod: CPTII,S$GLB,, | Performed by: OPHTHALMOLOGY

## 2022-06-24 PROCEDURE — 99024 PR POST-OP FOLLOW-UP VISIT: ICD-10-PCS | Mod: S$GLB,,, | Performed by: OPHTHALMOLOGY

## 2022-06-24 PROCEDURE — 99024 POSTOP FOLLOW-UP VISIT: CPT | Mod: S$GLB,,, | Performed by: OPHTHALMOLOGY

## 2022-06-24 PROCEDURE — 1159F MED LIST DOCD IN RCRD: CPT | Mod: CPTII,S$GLB,, | Performed by: OPHTHALMOLOGY

## 2022-06-24 PROCEDURE — 99999 PR PBB SHADOW E&M-EST. PATIENT-LVL II: CPT | Mod: PBBFAC,,, | Performed by: OPHTHALMOLOGY

## 2022-06-24 NOTE — PROGRESS NOTES
HPI     I see pt's wife / pt saw dr gutierres now sees dr. Zapata for retina / DM    S/P  Phaco IOL OD 3/27/22 - toric  S/P Phaco IOL OS 5/15/22 - toric  Laser- Diabetic Retinopathy 20+ years ago OU   Diabetic retinopathy   Family History of glaucoma- Mother   SLT OU     C/o:   Vision is good,no complaints    Last edited by Astrid Grullon MA on 6/24/2022  2:07 PM. (History)            Assessment /Plan     For exam results, see Encounter Report.    Status post cataract extraction and insertion of intraocular lens, right    Status post cataract extraction and insertion of intraocular lens, left    Stable proliferative diabetic retinopathy of both eyes associated with type 2 diabetes mellitus      S/P  Phaco IOL OD 3/27/22 - toric  S/P Phaco IOL OS 5/15/22 - toric    Doing great, content with VA without correction distance / readers for near    Laser- Diabetic Retinopathy 20+ years ago OU   Diabetic retinopathy   Family History of glaucoma- Mother   SLT OU     F/up Dr. Zapata for retinal exams or Dr. Lim, me prn

## 2023-06-05 ENCOUNTER — OFFICE VISIT (OUTPATIENT)
Dept: OPHTHALMOLOGY | Facility: CLINIC | Age: 73
End: 2023-06-05
Payer: MEDICARE

## 2023-06-05 DIAGNOSIS — H26.492 POSTERIOR CAPSULAR OPACIFICATION VISUALLY SIGNIFICANT, LEFT EYE: ICD-10-CM

## 2023-06-05 DIAGNOSIS — H26.491 POSTERIOR CAPSULAR OPACIFICATION VISUALLY SIGNIFICANT, RIGHT EYE: Primary | ICD-10-CM

## 2023-06-05 PROCEDURE — 1126F AMNT PAIN NOTED NONE PRSNT: CPT | Mod: CPTII,S$GLB,, | Performed by: OPHTHALMOLOGY

## 2023-06-05 PROCEDURE — 3288F FALL RISK ASSESSMENT DOCD: CPT | Mod: CPTII,S$GLB,, | Performed by: OPHTHALMOLOGY

## 2023-06-05 PROCEDURE — 99999 PR PBB SHADOW E&M-EST. PATIENT-LVL II: CPT | Mod: PBBFAC,,, | Performed by: OPHTHALMOLOGY

## 2023-06-05 PROCEDURE — 1159F MED LIST DOCD IN RCRD: CPT | Mod: CPTII,S$GLB,, | Performed by: OPHTHALMOLOGY

## 2023-06-05 PROCEDURE — 66821 YAG CAPSULOTOMY - OS - LEFT EYE: ICD-10-PCS | Mod: 50,S$GLB,, | Performed by: OPHTHALMOLOGY

## 2023-06-05 PROCEDURE — 3288F PR FALLS RISK ASSESSMENT DOCUMENTED: ICD-10-PCS | Mod: CPTII,S$GLB,, | Performed by: OPHTHALMOLOGY

## 2023-06-05 PROCEDURE — 1101F PR PT FALLS ASSESS DOC 0-1 FALLS W/OUT INJ PAST YR: ICD-10-PCS | Mod: CPTII,S$GLB,, | Performed by: OPHTHALMOLOGY

## 2023-06-05 PROCEDURE — 1159F PR MEDICATION LIST DOCUMENTED IN MEDICAL RECORD: ICD-10-PCS | Mod: CPTII,S$GLB,, | Performed by: OPHTHALMOLOGY

## 2023-06-05 PROCEDURE — 99214 OFFICE O/P EST MOD 30 MIN: CPT | Mod: 57,S$GLB,, | Performed by: OPHTHALMOLOGY

## 2023-06-05 PROCEDURE — 66821 AFTER CATARACT LASER SURGERY: CPT | Mod: 50,S$GLB,, | Performed by: OPHTHALMOLOGY

## 2023-06-05 PROCEDURE — 1101F PT FALLS ASSESS-DOCD LE1/YR: CPT | Mod: CPTII,S$GLB,, | Performed by: OPHTHALMOLOGY

## 2023-06-05 PROCEDURE — 1126F PR PAIN SEVERITY QUANTIFIED, NO PAIN PRESENT: ICD-10-PCS | Mod: CPTII,S$GLB,, | Performed by: OPHTHALMOLOGY

## 2023-06-05 PROCEDURE — 99214 PR OFFICE/OUTPT VISIT, EST, LEVL IV, 30-39 MIN: ICD-10-PCS | Mod: 57,S$GLB,, | Performed by: OPHTHALMOLOGY

## 2023-06-05 PROCEDURE — 99999 PR PBB SHADOW E&M-EST. PATIENT-LVL II: ICD-10-PCS | Mod: PBBFAC,,, | Performed by: OPHTHALMOLOGY

## 2023-06-05 RX ORDER — MELOXICAM 15 MG/1
15 TABLET ORAL
COMMUNITY
Start: 2023-01-23

## 2023-06-05 NOTE — PROGRESS NOTES
HPI    Referred by Dr. Zapata    S/P  Phaco IOL OD 3/27/22 - toric  S/P Phaco IOL OS 5/15/22 - toric  Laser- Diabetic Retinopathy 20+ years ago OU   Diabetic retinopathy   Family History of glaucoma- Mother   SLT OU     Patient saw Dr. Zapata last month and was told he still had a film OU. Only   notices blurred vision at distance. No near vision complaints.      Last edited by America Astudillo MA on 6/5/2023 11:19 AM.            Assessment /Plan     For exam results, see Encounter Report.    Posterior capsular opacification visually significant, right eye  -     Yag Capsulotomy - OS - Left Eye  -     Yag Capsulotomy - OD - Right Eye    Posterior capsular opacification visually significant, left eye  -     Yag Capsulotomy - OS - Left Eye  -     Yag Capsulotomy - OD - Right Eye      Visually significant posterior capsular opacity present.  OU  - discussed risks, benefits, and alternatives to laser surgery - pt wishes to proceed with yag laser  - Informed consent obtained and correct eye(s) verified with patient.  - Intraocular Pressure to be taken 10- 30 minutes post procedure.   - PF QID x 4 days then d/c  - f/up as scheduled    DIAGNOSIS: Visually significant posterior capsular opacity    PROCEDURE: YAG Laser Capsulotomy OU    COMPLICATIONS: none     DESCRIPTION OF PROCEDURE IN DETAIL:  1 drop of topical Proparacaine and Iopidine instilled, and eye(s) dilated with 1% Tropicamide 2.5% Phenylephrine. YAG laser applied to posterior capsule in cruciate pattern.      DISPOSITION:  Patient tolerated procedure well.      Will call pt next wk to ensure doing well s/p yag cap    F/up optom REE

## 2023-06-12 ENCOUNTER — TELEPHONE (OUTPATIENT)
Dept: OPHTHALMOLOGY | Facility: CLINIC | Age: 73
End: 2023-06-12
Payer: MEDICARE

## 2023-07-27 ENCOUNTER — OFFICE VISIT (OUTPATIENT)
Dept: URGENT CARE | Facility: CLINIC | Age: 73
End: 2023-07-27
Payer: MEDICARE

## 2023-07-27 VITALS
RESPIRATION RATE: 20 BRPM | BODY MASS INDEX: 24.93 KG/M2 | TEMPERATURE: 98 F | DIASTOLIC BLOOD PRESSURE: 90 MMHG | HEART RATE: 78 BPM | WEIGHT: 164.5 LBS | OXYGEN SATURATION: 98 % | SYSTOLIC BLOOD PRESSURE: 150 MMHG | HEIGHT: 68 IN

## 2023-07-27 DIAGNOSIS — L50.9 URTICARIA: Primary | ICD-10-CM

## 2023-07-27 DIAGNOSIS — I10 HYPERTENSION, UNSPECIFIED TYPE: ICD-10-CM

## 2023-07-27 DIAGNOSIS — Z79.4 TYPE 2 DIABETES MELLITUS WITH RETINOPATHY, WITH LONG-TERM CURRENT USE OF INSULIN, MACULAR EDEMA PRESENCE UNSPECIFIED, UNSPECIFIED LATERALITY, UNSPECIFIED RETINOPATHY SEVERITY: ICD-10-CM

## 2023-07-27 DIAGNOSIS — E11.319 TYPE 2 DIABETES MELLITUS WITH RETINOPATHY, WITH LONG-TERM CURRENT USE OF INSULIN, MACULAR EDEMA PRESENCE UNSPECIFIED, UNSPECIFIED LATERALITY, UNSPECIFIED RETINOPATHY SEVERITY: ICD-10-CM

## 2023-07-27 PROCEDURE — 99203 OFFICE O/P NEW LOW 30 MIN: CPT | Mod: 25,S$GLB,, | Performed by: PHYSICIAN ASSISTANT

## 2023-07-27 PROCEDURE — 99203 PR OFFICE/OUTPT VISIT, NEW, LEVL III, 30-44 MIN: ICD-10-PCS | Mod: 25,S$GLB,, | Performed by: PHYSICIAN ASSISTANT

## 2023-07-27 PROCEDURE — 96372 PR INJECTION,THERAP/PROPH/DIAG2ST, IM OR SUBCUT: ICD-10-PCS | Mod: S$GLB,,, | Performed by: PHYSICIAN ASSISTANT

## 2023-07-27 PROCEDURE — 96372 THER/PROPH/DIAG INJ SC/IM: CPT | Mod: S$GLB,,, | Performed by: PHYSICIAN ASSISTANT

## 2023-07-27 RX ORDER — DEXAMETHASONE SODIUM PHOSPHATE 100 MG/10ML
8 INJECTION INTRAMUSCULAR; INTRAVENOUS
Status: COMPLETED | OUTPATIENT
Start: 2023-07-27 | End: 2023-07-27

## 2023-07-27 RX ADMIN — DEXAMETHASONE SODIUM PHOSPHATE 8 MG: 100 INJECTION INTRAMUSCULAR; INTRAVENOUS at 04:07

## 2023-07-27 NOTE — PATIENT INSTRUCTIONS
You received a steroid shot today - this can elevate your blood pressure, elevate your blood sugar, water weight gain, nervous energy, redness to the face and dimpling of the skin where the shot goes in. Monitor your blood sugar and use your sliding scale appropriately. The steroid injection will elevate your blood sugar over the next few days.    You must understand that you've received an Urgent Care treatment only and that you may be released before all your medical problems are known or treated. You, the patient, will arrange for follow up care as instructed.      Follow up with your PCP or specialty clinic as instructed in the next 2-3 days if not improved or as needed. You can call [phone] to schedule an appointment with appropriate provider.      If you condition worsens, we recommend that you receive another evaluation at the emergency room immediately or contact your primary medical clinic's after hours call service to discuss your concerns.      Please return here or go to the Emergency Department for any concerns or worsening condition.      If you were prescribed a narcotic or controlled substance, do not drive or operate heavy equipment or machinery while taking these medications.

## 2023-07-27 NOTE — PROGRESS NOTES
"Subjective:      Patient ID: David Solomon is a 73 y.o. male.    Vitals:  height is 5' 8" (1.727 m) and weight is 74.6 kg (164 lb 8 oz). His oral temperature is 97.9 °F (36.6 °C). His blood pressure is 150/90 (abnormal) and his pulse is 78. His respiration is 20 and oxygen saturation is 98%.     Chief Complaint: Rash    Pt complains of rash that developed yesterday. It is mainly in the creases on his body. It is worse in him groin arm.  He has been using sarna itch ointment and benadryl with mild relief. Pt denies any new foods, medication, activities.     Patient provider note starts here:  Patient presents with complaints of having a rash and subsequent itching all over his body since yesterday. Reports severe itching in the groin, back of the thighs and axillary regions. Unsure of exact cause but did try new Gain scent beads in the laundry recently. States that after the itching started, he changed his clothes to wear clothes that were not washed with the scent beads but the rash and itching progressed. Denies throat swelling or SOB. Denies the use of new foods, medications. Has been outside a lot lately but has not gotten a rash in the heat before. He took some Benadryl earlier and another just prior to arrival without significant relief. Also using a topical itch cream but has to put it all over his body.     Rash  This is a new problem. The current episode started yesterday. The problem is unchanged. The rash is diffuse. The rash is characterized by itchiness. It is unknown if there was an exposure to a precipitant. Pertinent negatives include no congestion, cough, fatigue, fever or shortness of breath. Treatments tried: sarna itch ointment, benadryl. There is no history of eczema.     Constitution: Negative for fatigue and fever.   HENT:  Negative for congestion.    Cardiovascular:  Negative for sob on exertion.   Respiratory:  Negative for chest tightness, cough, shortness of breath and wheezing.    Skin:  " Positive for rash and hives. Negative for erythema.   Allergic/Immunologic: Positive for hives and itching.    Objective:     Physical Exam   Constitutional: He is oriented to person, place, and time. He appears well-developed.  Non-toxic appearance. He does not appear ill. He appears distressed (Patient actively itching throughout exam).   HENT:   Head: Normocephalic and atraumatic. Head is without abrasion, without contusion and without laceration.   Ears:   Right Ear: External ear normal.   Left Ear: External ear normal.   Nose: Nose normal.   Mouth/Throat: Oropharynx is clear and moist and mucous membranes are normal. Mucous membranes are moist. Oropharynx is clear.      Comments: Airway patent    Eyes: Conjunctivae, EOM and lids are normal. Pupils are equal, round, and reactive to light.   Neck: Trachea normal and phonation normal. Neck supple.   Cardiovascular: Normal rate, regular rhythm and normal heart sounds.   Pulmonary/Chest: Effort normal and breath sounds normal. No stridor. No respiratory distress. He has no wheezes.   Musculoskeletal: Normal range of motion.         General: Normal range of motion.   Neurological: He is alert and oriented to person, place, and time.   Skin: Skin is warm, dry, intact and rash (Erythematous urticarial lesions noted all over the body. Very concerntrated in the pubic area, axillary regions bilaterally, bilateral flanks and posterior thighs bilaterally.). Capillary refill takes less than 2 seconds. No abrasion, No burn, No bruising, No erythema and No ecchymosis   Psychiatric: His speech is normal and behavior is normal. Judgment and thought content normal.   Nursing note and vitals reviewed.    Assessment:     1. Urticaria    2. Type 2 diabetes mellitus with retinopathy, with long-term current use of insulin, macular edema presence unspecified, unspecified laterality, unspecified retinopathy severity    3. Hypertension, unspecified type        Plan:       Urticaria  -      dexAMETHasone injection 8 mg    Type 2 diabetes mellitus with retinopathy, with long-term current use of insulin, macular edema presence unspecified, unspecified laterality, unspecified retinopathy severity    Hypertension, unspecified type          Medical Decision Making:   History:   Old Medical Records: I decided to obtain old medical records.  Urgent Care Management:  A. Problem List:   -Acute: Urticaria   -Chronic: DMII, retinopathy due to DM, HTN  B. Differential diagnosis: contact dermatitis, allergic reaction, urticaria (due to contact, chemical irritant, anxiety, environmental, idiopathic)   C. Diagnostic Testing Ordered: None  D. Diagnostic Testing Considered: None  E. Independent Historians: None  F. Urgent Care Midlevel Independent Results Interpretation:   G. Radiology:  H. Review of Previous Medical Records: PCP encounters not on file per chart review. Recent glucose readings have been in the mid-low 100s. Patient reports his A1C as 6.2 over the past 3 years.   I. Home Medications Reviewed  J. Social Determinants of Health considered  K. Medical Decision Making and Disposition: Patient presents with complaints of having an itchy rash all over his body for the past 1.5 days. On exam, he is afebrile and nontoxic appearing. There is an erythematous urticarial rash all over. Very prominent in the pubic region but there is no evidence of fungal component. I strongly feel that it is related to his clothing (due to being concentrated in the groin from his boxer briefs and axillary regions and flank from his shirt). He asked numerous times for a steroid injection. He seems to have strong knowledge of the DM process and how to treat his glucose appropriately. He understands that the Decadron injection will raise his glucose and he should monitor and treat appropriately (he is on a sliding scale). Reports having systemic steroid injections in the past and doing well with them. Also not possible for him to use  topical steroids in this case due to the diffuse nature of the hives. Also advised to take Zyrtec and Pepcid and follow-up closely with PCP. ED precautions also discussed. He verbalized understanding and agreed with plan.        Patient Instructions   You received a steroid shot today - this can elevate your blood pressure, elevate your blood sugar, water weight gain, nervous energy, redness to the face and dimpling of the skin where the shot goes in. Monitor your blood sugar and use your sliding scale appropriately. The steroid injection will elevate your blood sugar over the next few days.    You must understand that you've received an Urgent Care treatment only and that you may be released before all your medical problems are known or treated. You, the patient, will arrange for follow up care as instructed.      Follow up with your PCP or specialty clinic as instructed in the next 2-3 days if not improved or as needed. You can call [phone] to schedule an appointment with appropriate provider.      If you condition worsens, we recommend that you receive another evaluation at the emergency room immediately or contact your primary medical clinic's after hours call service to discuss your concerns.      Please return here or go to the Emergency Department for any concerns or worsening condition.      If you were prescribed a narcotic or controlled substance, do not drive or operate heavy equipment or machinery while taking these medications.

## 2023-07-29 ENCOUNTER — OFFICE VISIT (OUTPATIENT)
Dept: URGENT CARE | Facility: CLINIC | Age: 73
End: 2023-07-29
Payer: MEDICARE

## 2023-07-29 VITALS
HEART RATE: 73 BPM | WEIGHT: 164 LBS | SYSTOLIC BLOOD PRESSURE: 161 MMHG | DIASTOLIC BLOOD PRESSURE: 72 MMHG | OXYGEN SATURATION: 99 % | HEIGHT: 68 IN | TEMPERATURE: 98 F | BODY MASS INDEX: 24.86 KG/M2 | RESPIRATION RATE: 16 BRPM

## 2023-07-29 DIAGNOSIS — L50.9 URTICARIA: ICD-10-CM

## 2023-07-29 DIAGNOSIS — R21 RASH: Primary | ICD-10-CM

## 2023-07-29 PROCEDURE — 99213 PR OFFICE/OUTPT VISIT, EST, LEVL III, 20-29 MIN: ICD-10-PCS | Mod: 25,S$GLB,, | Performed by: FAMILY MEDICINE

## 2023-07-29 PROCEDURE — 99213 OFFICE O/P EST LOW 20 MIN: CPT | Mod: 25,S$GLB,, | Performed by: FAMILY MEDICINE

## 2023-07-29 PROCEDURE — 96372 THER/PROPH/DIAG INJ SC/IM: CPT | Mod: S$GLB,,, | Performed by: FAMILY MEDICINE

## 2023-07-29 PROCEDURE — 96372 PR INJECTION,THERAP/PROPH/DIAG2ST, IM OR SUBCUT: ICD-10-PCS | Mod: S$GLB,,, | Performed by: FAMILY MEDICINE

## 2023-07-29 RX ORDER — PREDNISONE 20 MG/1
20 TABLET ORAL DAILY
Qty: 7 TABLET | Refills: 0 | Status: SHIPPED | OUTPATIENT
Start: 2023-07-29 | End: 2023-08-05

## 2023-07-29 RX ORDER — DIPHENHYDRAMINE HYDROCHLORIDE 50 MG/ML
50 INJECTION INTRAMUSCULAR; INTRAVENOUS
Status: COMPLETED | OUTPATIENT
Start: 2023-07-29 | End: 2023-07-29

## 2023-07-29 RX ORDER — TRIAMCINOLONE ACETONIDE 1 MG/G
CREAM TOPICAL 2 TIMES DAILY
Qty: 80 G | Refills: 1 | Status: ON HOLD | OUTPATIENT
Start: 2023-07-29 | End: 2023-12-01 | Stop reason: HOSPADM

## 2023-07-29 RX ADMIN — DIPHENHYDRAMINE HYDROCHLORIDE 50 MG: 50 INJECTION INTRAMUSCULAR; INTRAVENOUS at 09:07

## 2023-07-29 NOTE — PROGRESS NOTES
"Subjective:      Patient ID: David Solomon is a 73 y.o. male.    Vitals:  height is 5' 8" (1.727 m) and weight is 74.4 kg (164 lb). His oral temperature is 98.2 °F (36.8 °C). His blood pressure is 161/72 (abnormal) and his pulse is 73. His respiration is 16 and oxygen saturation is 99%.     Chief Complaint: Rash    Patient stated his hives started 2 days ago.  He was seen by melissa 2 days ago and given a steroid injection.  He stated the injection helped him but once he out his clothing back on the rash was the same. Patient thinks his rash is from using the gain beads in the washer.     Rash  This is a new problem. The current episode started in the past 7 days. The problem has been gradually worsening since onset. The affected locations include the abdomen, back, left buttock, right buttock, right lower leg, left lower leg, right elbow and left elbow. The rash is characterized by itchiness and redness. He was exposed to a new detergent/soap. Pertinent negatives include no congestion, cough, diarrhea, eye pain, fatigue, fever, sore throat or vomiting. Treatments tried: zyrtec, pepcid. The treatment provided mild relief. There is no history of allergies, asthma, eczema or varicella.       Constitution: Negative for fatigue and fever.   HENT:  Negative for congestion and sore throat.    Eyes:  Negative for eye pain.   Respiratory:  Negative for cough.    Gastrointestinal:  Negative for vomiting and diarrhea.   Skin:  Positive for rash.      Objective:     Physical Exam   Constitutional: He is oriented to person, place, and time. He appears well-developed. He is cooperative.   HENT:   Head: Normocephalic and atraumatic.   Ears:   Right Ear: Hearing, tympanic membrane, external ear and ear canal normal.   Left Ear: Hearing, tympanic membrane, external ear and ear canal normal.   Nose: Nose normal. No mucosal edema or nasal deformity. No epistaxis. Right sinus exhibits no maxillary sinus tenderness and no frontal sinus " tenderness. Left sinus exhibits no maxillary sinus tenderness and no frontal sinus tenderness.   Mouth/Throat: Uvula is midline, oropharynx is clear and moist and mucous membranes are normal. Mucous membranes are moist. No trismus in the jaw. Normal dentition. No uvula swelling. Oropharynx is clear.   Eyes: Conjunctivae and lids are normal.   Neck: Trachea normal and phonation normal. Neck supple.   Cardiovascular: Normal rate, regular rhythm, normal heart sounds and normal pulses.   Pulmonary/Chest: Effort normal and breath sounds normal.   Abdominal: Normal appearance and bowel sounds are normal. Soft.   Musculoskeletal: Normal range of motion.         General: Normal range of motion.   Neurological: He is alert and oriented to person, place, and time. He exhibits normal muscle tone.   Skin: Skin is warm, dry and intact.         Comments: Scattered welts all over trunk and extrimities, non papular non vesicular  rash     Psychiatric: His speech is normal and behavior is normal. Judgment and thought content normal.   Nursing note and vitals reviewed.      Assessment:     1. Rash    2. Urticaria        Plan:       Rash    Urticaria    Other orders  -     diphenhydrAMINE injection 50 mg  -     predniSONE (DELTASONE) 20 MG tablet; Take 1 tablet (20 mg total) by mouth once daily. for 7 days  Dispense: 7 tablet; Refill: 0  -     triamcinolone acetonide 0.1% (KENALOG) 0.1 % cream; Apply topically 2 (two) times daily. for 10 days  Dispense: 80 g; Refill: 1

## 2023-07-31 ENCOUNTER — OFFICE VISIT (OUTPATIENT)
Dept: URGENT CARE | Facility: CLINIC | Age: 73
End: 2023-07-31
Payer: MEDICARE

## 2023-07-31 ENCOUNTER — OFFICE VISIT (OUTPATIENT)
Dept: ALLERGY | Facility: CLINIC | Age: 73
End: 2023-07-31
Payer: MEDICARE

## 2023-07-31 VITALS
TEMPERATURE: 98 F | WEIGHT: 164 LBS | BODY MASS INDEX: 24.86 KG/M2 | HEIGHT: 68 IN | RESPIRATION RATE: 16 BRPM | OXYGEN SATURATION: 99 % | HEART RATE: 86 BPM | SYSTOLIC BLOOD PRESSURE: 174 MMHG | DIASTOLIC BLOOD PRESSURE: 81 MMHG

## 2023-07-31 VITALS
WEIGHT: 164 LBS | SYSTOLIC BLOOD PRESSURE: 194 MMHG | HEART RATE: 86 BPM | BODY MASS INDEX: 24.94 KG/M2 | DIASTOLIC BLOOD PRESSURE: 76 MMHG

## 2023-07-31 DIAGNOSIS — I10 HYPERTENSION, UNSPECIFIED TYPE: ICD-10-CM

## 2023-07-31 DIAGNOSIS — E11.319 TYPE 2 DIABETES MELLITUS WITH RETINOPATHY, WITH LONG-TERM CURRENT USE OF INSULIN, MACULAR EDEMA PRESENCE UNSPECIFIED, UNSPECIFIED LATERALITY, UNSPECIFIED RETINOPATHY SEVERITY: ICD-10-CM

## 2023-07-31 DIAGNOSIS — Z79.4 TYPE 2 DIABETES MELLITUS WITH RETINOPATHY, WITH LONG-TERM CURRENT USE OF INSULIN, MACULAR EDEMA PRESENCE UNSPECIFIED, UNSPECIFIED LATERALITY, UNSPECIFIED RETINOPATHY SEVERITY: ICD-10-CM

## 2023-07-31 DIAGNOSIS — L50.9 URTICARIA: Primary | ICD-10-CM

## 2023-07-31 PROCEDURE — 3008F BODY MASS INDEX DOCD: CPT | Mod: CPTII,S$GLB,, | Performed by: STUDENT IN AN ORGANIZED HEALTH CARE EDUCATION/TRAINING PROGRAM

## 2023-07-31 PROCEDURE — 3077F SYST BP >= 140 MM HG: CPT | Mod: CPTII,S$GLB,, | Performed by: STUDENT IN AN ORGANIZED HEALTH CARE EDUCATION/TRAINING PROGRAM

## 2023-07-31 PROCEDURE — 99999 PR PBB SHADOW E&M-EST. PATIENT-LVL III: ICD-10-PCS | Mod: PBBFAC,,, | Performed by: STUDENT IN AN ORGANIZED HEALTH CARE EDUCATION/TRAINING PROGRAM

## 2023-07-31 PROCEDURE — 3008F PR BODY MASS INDEX (BMI) DOCUMENTED: ICD-10-PCS | Mod: CPTII,S$GLB,, | Performed by: STUDENT IN AN ORGANIZED HEALTH CARE EDUCATION/TRAINING PROGRAM

## 2023-07-31 PROCEDURE — 99214 PR OFFICE/OUTPT VISIT, EST, LEVL IV, 30-39 MIN: ICD-10-PCS | Mod: S$GLB,,, | Performed by: PHYSICIAN ASSISTANT

## 2023-07-31 PROCEDURE — 99999 PR PBB SHADOW E&M-EST. PATIENT-LVL III: CPT | Mod: PBBFAC,,, | Performed by: STUDENT IN AN ORGANIZED HEALTH CARE EDUCATION/TRAINING PROGRAM

## 2023-07-31 PROCEDURE — 3078F DIAST BP <80 MM HG: CPT | Mod: CPTII,S$GLB,, | Performed by: STUDENT IN AN ORGANIZED HEALTH CARE EDUCATION/TRAINING PROGRAM

## 2023-07-31 PROCEDURE — 1159F MED LIST DOCD IN RCRD: CPT | Mod: CPTII,S$GLB,, | Performed by: STUDENT IN AN ORGANIZED HEALTH CARE EDUCATION/TRAINING PROGRAM

## 2023-07-31 PROCEDURE — 3077F PR MOST RECENT SYSTOLIC BLOOD PRESSURE >= 140 MM HG: ICD-10-PCS | Mod: CPTII,S$GLB,, | Performed by: STUDENT IN AN ORGANIZED HEALTH CARE EDUCATION/TRAINING PROGRAM

## 2023-07-31 PROCEDURE — 99204 OFFICE O/P NEW MOD 45 MIN: CPT | Mod: S$GLB,,, | Performed by: STUDENT IN AN ORGANIZED HEALTH CARE EDUCATION/TRAINING PROGRAM

## 2023-07-31 PROCEDURE — 1159F PR MEDICATION LIST DOCUMENTED IN MEDICAL RECORD: ICD-10-PCS | Mod: CPTII,S$GLB,, | Performed by: STUDENT IN AN ORGANIZED HEALTH CARE EDUCATION/TRAINING PROGRAM

## 2023-07-31 PROCEDURE — 99214 OFFICE O/P EST MOD 30 MIN: CPT | Mod: S$GLB,,, | Performed by: PHYSICIAN ASSISTANT

## 2023-07-31 PROCEDURE — 1160F RVW MEDS BY RX/DR IN RCRD: CPT | Mod: CPTII,S$GLB,, | Performed by: STUDENT IN AN ORGANIZED HEALTH CARE EDUCATION/TRAINING PROGRAM

## 2023-07-31 PROCEDURE — 99204 PR OFFICE/OUTPT VISIT, NEW, LEVL IV, 45-59 MIN: ICD-10-PCS | Mod: S$GLB,,, | Performed by: STUDENT IN AN ORGANIZED HEALTH CARE EDUCATION/TRAINING PROGRAM

## 2023-07-31 PROCEDURE — 3078F PR MOST RECENT DIASTOLIC BLOOD PRESSURE < 80 MM HG: ICD-10-PCS | Mod: CPTII,S$GLB,, | Performed by: STUDENT IN AN ORGANIZED HEALTH CARE EDUCATION/TRAINING PROGRAM

## 2023-07-31 PROCEDURE — 1160F PR REVIEW ALL MEDS BY PRESCRIBER/CLIN PHARMACIST DOCUMENTED: ICD-10-PCS | Mod: CPTII,S$GLB,, | Performed by: STUDENT IN AN ORGANIZED HEALTH CARE EDUCATION/TRAINING PROGRAM

## 2023-07-31 RX ORDER — MONTELUKAST SODIUM 10 MG/1
10 TABLET ORAL NIGHTLY
Qty: 30 TABLET | Refills: 2 | Status: SHIPPED | OUTPATIENT
Start: 2023-07-31 | End: 2023-10-29

## 2023-07-31 NOTE — PROGRESS NOTES
"Subjective:      Patient ID: David Solomon is a 73 y.o. male.    Vitals:  height is 5' 8" (1.727 m) and weight is 74.4 kg (164 lb). His oral temperature is 98.3 °F (36.8 °C). His blood pressure is 174/81 (abnormal) and his pulse is 86. His respiration is 16 and oxygen saturation is 99%.     Chief Complaint: Rash    Pt is still having a skin irritation which is now spreading.     Patient provider note starts here:  Patient presents with complaints of having hives to the body which has continued since his first encounter in urgent care on 7/27. He was subsequently seen on 7/29 for the same. Reports that the rash is spreading. Denies chest pain, SOB, or throat swelling. Currently taking 20mg Prednisone daily which was prescribed on 7/29.     Rash  This is a recurrent problem. The current episode started in the past 7 days. The problem is unchanged. Pertinent negatives include no congestion, cough, diarrhea, fever, sore throat or vomiting.       Constitution: Negative for chills and fever.   HENT:  Negative for congestion and sore throat.    Neck: Negative for neck pain and neck stiffness.   Cardiovascular:  Negative for chest pain.   Respiratory:  Negative for chest tightness, cough and wheezing.    Gastrointestinal:  Negative for abdominal pain, vomiting and diarrhea.   Musculoskeletal:  Negative for pain.   Skin:  Positive for rash and hives. Negative for wound and erythema.   Allergic/Immunologic: Positive for hives and itching.   Neurological:  Negative for numbness and tingling.      Objective:     Physical Exam   Constitutional: He is oriented to person, place, and time. He appears well-developed.  Non-toxic appearance. He does not appear ill. No distress.   HENT:   Head: Normocephalic and atraumatic. Head is without abrasion, without contusion and without laceration.   Ears:   Right Ear: External ear normal.   Left Ear: External ear normal.   Nose: Nose normal. No congestion.   Mouth/Throat: Oropharynx is clear " and moist and mucous membranes are normal.   Eyes: Conjunctivae, EOM and lids are normal. Pupils are equal, round, and reactive to light.   Neck: Trachea normal and phonation normal. Neck supple.   Cardiovascular: Normal rate, regular rhythm and normal heart sounds.   Pulmonary/Chest: Effort normal and breath sounds normal. No stridor. No respiratory distress. He has no wheezes.   Musculoskeletal: Normal range of motion.         General: Normal range of motion.   Neurological: He is alert and oriented to person, place, and time.   Skin: Skin is warm, dry, intact and rash. Capillary refill takes less than 2 seconds. No abrasion, No burn, No erythema and No ecchymosis         Comments: Erythematous urticarial rash noted diffusely to the body. No evidence of secondary cellulitis.   There is bleeding noted under the skin on the posterior aspects of the bilateral forearms.    Psychiatric: His speech is normal and behavior is normal. Judgment and thought content normal.   Nursing note and vitals reviewed.      Assessment:     No diagnosis found.    Plan:       There are no diagnoses linked to this encounter.      Medical Decision Making:   History:   Old Medical Records: I decided to obtain old medical records.  Urgent Care Management:  A. Problem List:   -Acute: Urticaria   -Chronic: DM, HTN  B. Differential diagnosis: urticaria, contact dermatitis  C. Diagnostic Testing Ordered: Referral to allergy  D. Diagnostic Testing Considered:  E. Independent Historians:  F. Urgent Care Midlevel Independent Results Interpretation:   G. Radiology:  H. Review of Previous Medical Records: Patient evaluated here on 7/27 and administered steroid injection. Evaluated here 7/29 and administered Benadryl injection and Prednisone 20mg daily.   I. Home Medications Reviewed  J. Social Determinants of Health considered  K. Medical Decision Making and Disposition: Patient presents with complaints of having hives all over his body. On exam, he  is afebrile and nontoxic appearing. There is an urticarial rash noted on the body which bleeding under the skin on the posterior aspect of the bilateral forearms due to itching. Reports that the itching continues even on the prednisone. I have referred to allergy and he was able to get an appointment for this morning. I contacted Dr. Del Angel and she is expecting to see patient in the clinic this morning.

## 2023-07-31 NOTE — PATIENT INSTRUCTIONS
You must understand that you've received an Urgent Care treatment only and that you may be released before all your medical problems are known or treated. You, the patient, will arrange for follow up care as instructed.      Follow up with your PCP or specialty clinic as instructed in the next 2-3 days if not improved or as needed. You can call [phone] to schedule an appointment with appropriate provider.      If you condition worsens, we recommend that you receive another evaluation at the emergency room immediately or contact your primary medical clinic's after hours call service to discuss your concerns.      Please return here or go to the Emergency Department for any concerns or worsening condition.      If you were prescribed a narcotic or controlled substance, do not drive or operate heavy equipment or machinery while taking these medications.

## 2023-07-31 NOTE — PROGRESS NOTES
ALLERGY & IMMUNOLOGY CLINIC - INITIAL CONSULTATION      HISTORY OF PRESENT ILLNESS     Patient ID: David Solomon is a 73 y.o. male    CC: urticaria     HPI: David Solomon is a 73 y.o. male with DM2 and HTN, presenting for urticaria.  Patient was referred by Tami Wade PA-C (urgent care).    Symptoms started 7/26/23. He says he was tried gain scented detergent beads to scent clothes. He says he wore those clothes. And that night, he felt itchy on abdomen, groin, inner thighs. The pruritus got very bad, which is why he went to urgent care. He rewashed his clothes without beads. But it happened again. He has been scratching so much that his arms are bruised. He says even the palms of his hands were itching. He had pruritus of his legs, ankles, back. He made sure to stop wearing clothes washed with the beads, but the pruritus is still coming.     Symptoms have occur every day since the onset. Symptoms worse in the evening.   No angioedema.  Lesions are pruritic, not painful.  Individual urticarial lesions last no longer than a day. They can leave faint pink marks behind.  Exacerbating factors: None identified. No new foods.   Hasn't found alcohol or NSAIDs to be a trigger. He does take meloxicam but not every day.  He hasn't been ill or had infection recently.     Patient denies fevers, chills, night sweats, unexplained weight loss, blood in stool, melena, easy bleeding, unusual lumps or bumps, hot/cold intolerance, shortness of breath, wheezing, and cough.  Patient reports he isn't up to date on colonoscopies. He says he has discussed this with his pcp.    He went to urgent care on 7/27/23 for the urticaria, received steroid injection.  Went back to urgent care on 7/29/23, prescribed prednisone 20 mg x7 days, triamcinolone 0.1% cream, and received benadryl injection.  Went back to the urgent care today.  He says the steroid injection helped initially, but it came back that night.   The benadryl injection  didn't seem to help.  He had taken a benadryl the first night, but it didn't seem to help.  He is still on the prednisone. He started taking a daily zyrtec 10 mg BID after his first urgent care visit. He says these (both the prednisone and zyrtec) may help during the day, but he continues to get worsening of symptoms at night. He was also prescribed pepcid. The zyrtec isn't causing drowsiness.  He says last night, he tried to time the zyrtec to take it later, so that he wouldn't be bothered by symptoms while trying to sleep, but he says symptoms ended up being terrible last night.    He says that years ago, at night, he would get pruritus without a rash. This went on every winter for a while. He says the only thing that would help was a cold pack. He says this hasn't happened in years. And he had never had the actual hives before this week.     He gets his bloodwork done at [facility]. He says it was last done 2 weeks ago, and was told that everything looked good. He says the only thing that wasn't in normal range was that his testosterone level was high. He says he exercises regularly.     MEDICAL HISTORY     Vaccines:   Immunization History   Administered Date(s) Administered    COVID-19, MRNA, LN-S, PF (Pfizer) (Purple Cap) 03/05/2021, 03/26/2021, 09/29/2021    Influenza (FLUAD) - Quadrivalent - Adjuvanted - PF *Preferred* (65+) 10/29/2021    Influenza - Quadrivalent - High Dose - PF (65 years and older) 09/24/2020    Influenza - Quadrivalent - PF *Preferred* (6 months and older) 10/17/2019    Pneumococcal Conjugate - 20 Valent 06/28/2023    Td (ADULT) 04/14/2021    Tdap 09/08/2019    Zoster Recombinant 08/12/2019, 10/17/2019     Medical Hx:   Patient Active Problem List   Diagnosis    Chronic pain of left knee    Patellofemoral syndrome of left knee    Stable proliferative diabetic retinopathy of both eyes associated with type 2 diabetes mellitus    Age-related nuclear cataract of both eyes    Dry eye syndrome of  both eyes    Vitreous degeneration of both eyes     Surgical Hx:   Past Surgical History:   Procedure Laterality Date    CATARACT EXTRACTION Right 03/27/2022    Dr. Ricketts    INTRAOCULAR PROSTHESES INSERTION Right 3/27/2022    Procedure: INSERTION, IOL PROSTHESIS;  Surgeon: Divine Ricketts MD;  Location: [facility] OR [address] [place] [address];  Service: Ophthalmology;  Laterality: Right;    INTRAOCULAR PROSTHESES INSERTION Left 5/15/2022    Procedure: INSERTION, IOL PROSTHESIS;  Surgeon: Divine Ricketts MD;  Location: [facility] OR [address] [place] [address];  Service: Ophthalmology;  Laterality: Left;    PHACOEMULSIFICATION OF CATARACT Right 3/27/2022    Procedure: PHACOEMULSIFICATION, CATARACT;  Surgeon: Divine Ricketts MD;  Location: [facility] OR [address] [place] [address];  Service: Ophthalmology;  Laterality: Right;    PHACOEMULSIFICATION OF CATARACT Left 5/15/2022    Procedure: PHACOEMULSIFICATION, CATARACT;  Surgeon: Divine Ricketts MD;  Location: [facility] OR [address] [place] [address];  Service: Ophthalmology;  Laterality: Left;     Medications:   Current Outpatient Medications on File Prior to Visit   Medication Sig Dispense Refill    atorvastatin (LIPITOR) 40 MG tablet Take 40 mg by mouth every evening.      diltiaZEM (CARDIZEM LA) 180 mg 24 hr tablet Take 180 mg by mouth once daily.      fosinopril (MONOPRIL) 40 MG tablet Take 40 mg by mouth nightly.      HUMALOG KWIKPEN INSULIN 100 unit/mL pen Inject into the skin.      HYDROcodone-acetaminophen (NORCO) 5-325 mg per tablet Take 1 tablet by mouth every 6 (six) hours as needed.      insulin glargine (LANTUS) 100 unit/mL injection Inject 22 Units into the skin once daily.      meloxicam (MOBIC) 15 MG tablet Take 15 mg by mouth.      predniSONE (DELTASONE) 20 MG tablet Take 1 tablet (20 mg total) by mouth once daily. for 7 days 7 tablet 0    triamcinolone acetonide 0.1% (KENALOG) 0.1 % cream Apply topically 2 (two) times daily. for 10 days 80 g 1     Current Facility-Administered Medications on File Prior to Visit   Medication Dose Route  Frequency Provider Last Rate Last Admin    phenylephrine HCL 2.5% ophthalmic solution 1 drop  1 drop Right Eye On Call Procedure Divine Ricektts MD   1 drop at 03/27/22 0813    phenylephrine HCL 2.5% ophthalmic solution 1 drop  1 drop Left Eye On Call Procedure Divine Ricketts MD   1 drop at 05/15/22 0948    proparacaine 0.5 % ophthalmic solution 1 drop  1 drop Right Eye On Call Procedure Divine Ricketts MD   1 drop at 03/27/22 0802    proparacaine 0.5 % ophthalmic solution 1 drop  1 drop Left Eye On Call Procedure Divine Ricketts MD   1 drop at 05/15/22 0936    tropicamide 1% ophthalmic solution 1 drop  1 drop Right Eye On Call Procedure Divine Ricketts MD   1 drop at 03/27/22 0813    tropicamide 1% ophthalmic solution 1 drop  1 drop Left Eye On Call Procedure Divine Ricketts MD   1 drop at 05/15/22 0947     H/o Asthma: denies    Drug Allergies: Review of patient's allergies indicates:  No Known Allergies    Env/Occ:   Occupation: retired     Social Hx:   Social History     Tobacco Use    Smoking status: Never    Smokeless tobacco: Never   Substance Use Topics    Alcohol use: No    Drug use: No     Family Hx:   Family History   Problem Relation Age of Onset    No Known Problems Mother     No Known Problems Father     Melanoma Neg Hx       PHYSICAL EXAM     VS: BP (!) 194/76   Pulse 86   Wt 74.4 kg (164 lb 0.4 oz)   BMI 24.94 kg/m²   GENERAL: Alert, NAD, well-appearing  EYES: EOMI, no conjunctival injection, no discharge, no infraorbital shiners  ORAL: MMM, no ulcers, no thrush  NECK: no thyromegaly, no LAD  LUNGS: CTAB, no w/r/c, no increased WOB  HEART: RRR, normal S1/S2, no m/g/r  DERM: scattered erythematous macules on bilateral thighs; ecchymosis on bilateral forearms   NEURO: normal speech, normal gait, no facial asymmetry     LABORATORY STUDIES     Component      Latest Ref Rng & Units 3/17/2017   WBC      3.90 - 12.70 K/uL 9.27   RBC      4.60 - 6.20 M/uL 4.22 (L)   Hemoglobin      14.0 - 18.0  g/dL 14.1   Hematocrit      40.0 - 54.0 % 39.9 (L)   MCV      82 - 98 fL 95   MCH      27.0 - 31.0 pg 33.4 (H)   MCHC      32.0 - 36.0 % 35.3   RDW      11.5 - 14.5 % 12.1   Platelets      150 - 350 K/uL 293   MPV      9.2 - 12.9 fL 10.4   Gran # (ANC)      1.8 - 7.7 K/uL 6.6   Lymph #      1.0 - 4.8 K/uL 1.7   Mono #      0.3 - 1.0 K/uL 0.9   Eos #      0.0 - 0.5 K/uL 0.1   Baso #      0.00 - 0.20 K/uL 0.02   Differential Method       Automated   Sodium      136 - 145 mmol/L 145   Potassium      3.5 - 5.1 mmol/L 3.6   Chloride      95 - 110 mmol/L 103   CO2      23 - 29 mmol/L 30 (H)   Glucose      70 - 110 mg/dL 99   BUN      2 - 20 mg/dL 11   Creatinine      0.50 - 1.40 mg/dL 1.16   Calcium      8.7 - 10.5 mg/dL 9.5   PROTEIN TOTAL      6.0 - 8.4 g/dL 7.6   Albumin      3.5 - 5.2 g/dL 4.4   BILIRUBIN TOTAL      0.1 - 1.0 mg/dL 0.8   Alkaline Phosphatase      38 - 126 U/L 82   AST      15 - 46 U/L 30   ALT      10 - 44 U/L 31      CHART REVIEW     Reviewed urgent care notes, labs.     ASSESSMENT & PLAN     David Solomon is a 73 y.o. male with     # Spontaneous urticaria: Symptoms started 7/26/23 (5 days ago) and have occurred daily since, worse in the evening. He has had 3 urgent care visits during this time. Received a steroid injection, and currently on a 7-day course of prednisone 20 mg daily. Taking cetirizine 10 mg BID. He reports medications helps somewhat (more so during the day), but still dealing with uncontrolled symptoms at night. Counseled that the pattern of his symptoms isn't consistent with an allergic reaction, but more consistent with spontaneous urticaria.   -increase cetirizine to 20 mg BID.   -continue pepcid.  -continue 7-day course of prednisone 20 mg (has about 4 days left).  -if symptoms don't respond to increased dose of cetirizine, start montelukast 10 mg nightly (I have prescribed this and counseled on the black box warning for neuropsychiatric side effects).   -if symptoms still  bothersome at time of next visit, will be considered chronic at that point and can further discuss the option of xolair.       Follow up: 5-6 weeks    I spent a total of 45 minutes on the day of the visit.  This includes face to face time and non-face to face time preparing to see the patient (eg, review of tests), obtaining and/or reviewing separately obtained history, documenting clinical information in the electronic or other health record.    Karli Del Angel MD  Allergy/Immunology

## 2023-09-18 ENCOUNTER — TELEPHONE (OUTPATIENT)
Dept: PAIN MEDICINE | Facility: CLINIC | Age: 73
End: 2023-09-18
Payer: MEDICARE

## 2023-09-18 NOTE — TELEPHONE ENCOUNTER
----- Message from Sonal Navarro sent at 9/18/2023  9:48 AM CDT -----  Needs advice from nurse:      Who Called:pt  Regarding:patient needs to be seen for lower back pain radiating into legs//will be a new patient/ with Dr Morales being out on maternity leave and not being able to schedule a new patient with Mr Blancas, I wasn't sure how to proceed since the patient only wants to go to [place]  Would the patient rather a call back or [facility]?  Best Call Back number:[phone]  Additional Info:

## 2023-10-04 ENCOUNTER — OFFICE VISIT (OUTPATIENT)
Dept: PAIN MEDICINE | Facility: CLINIC | Age: 73
End: 2023-10-04
Payer: MEDICARE

## 2023-10-04 ENCOUNTER — HOSPITAL ENCOUNTER (OUTPATIENT)
Dept: RADIOLOGY | Facility: HOSPITAL | Age: 73
Discharge: HOME | End: 2023-10-04
Attending: STUDENT IN AN ORGANIZED HEALTH CARE EDUCATION/TRAINING PROGRAM
Payer: MEDICARE

## 2023-10-04 VITALS
DIASTOLIC BLOOD PRESSURE: 96 MMHG | WEIGHT: 164 LBS | HEART RATE: 73 BPM | BODY MASS INDEX: 24.94 KG/M2 | SYSTOLIC BLOOD PRESSURE: 197 MMHG

## 2023-10-04 DIAGNOSIS — M54.9 BACK PAIN, UNSPECIFIED BACK LOCATION, UNSPECIFIED BACK PAIN LATERALITY, UNSPECIFIED CHRONICITY: ICD-10-CM

## 2023-10-04 DIAGNOSIS — M54.16 LUMBAR RADICULOPATHY: Primary | ICD-10-CM

## 2023-10-04 DIAGNOSIS — M54.16 LUMBAR RADICULOPATHY: ICD-10-CM

## 2023-10-04 PROCEDURE — 3080F PR MOST RECENT DIASTOLIC BLOOD PRESSURE >= 90 MM HG: ICD-10-PCS | Mod: CPTII,S$GLB,, | Performed by: STUDENT IN AN ORGANIZED HEALTH CARE EDUCATION/TRAINING PROGRAM

## 2023-10-04 PROCEDURE — 3077F SYST BP >= 140 MM HG: CPT | Mod: CPTII,S$GLB,, | Performed by: STUDENT IN AN ORGANIZED HEALTH CARE EDUCATION/TRAINING PROGRAM

## 2023-10-04 PROCEDURE — 1160F PR REVIEW ALL MEDS BY PRESCRIBER/CLIN PHARMACIST DOCUMENTED: ICD-10-PCS | Mod: CPTII,S$GLB,, | Performed by: STUDENT IN AN ORGANIZED HEALTH CARE EDUCATION/TRAINING PROGRAM

## 2023-10-04 PROCEDURE — 72114 X-RAY EXAM L-S SPINE BENDING: CPT | Mod: TC,FY

## 2023-10-04 PROCEDURE — 3077F PR MOST RECENT SYSTOLIC BLOOD PRESSURE >= 140 MM HG: ICD-10-PCS | Mod: CPTII,S$GLB,, | Performed by: STUDENT IN AN ORGANIZED HEALTH CARE EDUCATION/TRAINING PROGRAM

## 2023-10-04 PROCEDURE — 1125F AMNT PAIN NOTED PAIN PRSNT: CPT | Mod: CPTII,S$GLB,, | Performed by: STUDENT IN AN ORGANIZED HEALTH CARE EDUCATION/TRAINING PROGRAM

## 2023-10-04 PROCEDURE — 72114 XR LUMBAR SPINE 5 VIEW WITH FLEX AND EXT: ICD-10-PCS | Mod: 26,,, | Performed by: RADIOLOGY

## 2023-10-04 PROCEDURE — 3008F PR BODY MASS INDEX (BMI) DOCUMENTED: ICD-10-PCS | Mod: CPTII,S$GLB,, | Performed by: STUDENT IN AN ORGANIZED HEALTH CARE EDUCATION/TRAINING PROGRAM

## 2023-10-04 PROCEDURE — 3080F DIAST BP >= 90 MM HG: CPT | Mod: CPTII,S$GLB,, | Performed by: STUDENT IN AN ORGANIZED HEALTH CARE EDUCATION/TRAINING PROGRAM

## 2023-10-04 PROCEDURE — 1159F PR MEDICATION LIST DOCUMENTED IN MEDICAL RECORD: ICD-10-PCS | Mod: CPTII,S$GLB,, | Performed by: STUDENT IN AN ORGANIZED HEALTH CARE EDUCATION/TRAINING PROGRAM

## 2023-10-04 PROCEDURE — 3008F BODY MASS INDEX DOCD: CPT | Mod: CPTII,S$GLB,, | Performed by: STUDENT IN AN ORGANIZED HEALTH CARE EDUCATION/TRAINING PROGRAM

## 2023-10-04 PROCEDURE — 99204 OFFICE O/P NEW MOD 45 MIN: CPT | Mod: S$GLB,,, | Performed by: STUDENT IN AN ORGANIZED HEALTH CARE EDUCATION/TRAINING PROGRAM

## 2023-10-04 PROCEDURE — 1160F RVW MEDS BY RX/DR IN RCRD: CPT | Mod: CPTII,S$GLB,, | Performed by: STUDENT IN AN ORGANIZED HEALTH CARE EDUCATION/TRAINING PROGRAM

## 2023-10-04 PROCEDURE — 1159F MED LIST DOCD IN RCRD: CPT | Mod: CPTII,S$GLB,, | Performed by: STUDENT IN AN ORGANIZED HEALTH CARE EDUCATION/TRAINING PROGRAM

## 2023-10-04 PROCEDURE — 72114 X-RAY EXAM L-S SPINE BENDING: CPT | Mod: 26,,, | Performed by: RADIOLOGY

## 2023-10-04 PROCEDURE — 1125F PR PAIN SEVERITY QUANTIFIED, PAIN PRESENT: ICD-10-PCS | Mod: CPTII,S$GLB,, | Performed by: STUDENT IN AN ORGANIZED HEALTH CARE EDUCATION/TRAINING PROGRAM

## 2023-10-04 PROCEDURE — 99999 PR PBB SHADOW E&M-EST. PATIENT-LVL III: CPT | Mod: PBBFAC,,, | Performed by: STUDENT IN AN ORGANIZED HEALTH CARE EDUCATION/TRAINING PROGRAM

## 2023-10-04 PROCEDURE — 99204 PR OFFICE/OUTPT VISIT, NEW, LEVL IV, 45-59 MIN: ICD-10-PCS | Mod: S$GLB,,, | Performed by: STUDENT IN AN ORGANIZED HEALTH CARE EDUCATION/TRAINING PROGRAM

## 2023-10-04 PROCEDURE — 99999 PR PBB SHADOW E&M-EST. PATIENT-LVL III: ICD-10-PCS | Mod: PBBFAC,,, | Performed by: STUDENT IN AN ORGANIZED HEALTH CARE EDUCATION/TRAINING PROGRAM

## 2023-10-04 NOTE — PROGRESS NOTES
Chronic Pain - New Consult    Referring Physician: Other    Chief Complaint:   Chief Complaint   Patient presents with    Low-back Pain        SUBJECTIVE:    David Solomon presents to the clinic for the evaluation of lower back pain. The pain started 6 weeks ago following lawn work and symptoms have been worsening.The pain is located in the lower back area and radiates to the hamstrings and down to his calves.  The pain is described as stabbing and is rated as 7/10. The pain is rated with a score of  0/10 on the BEST day and a score of 9/10 on the WORST day.  Symptoms interfere with daily activity. The pain is exacerbated by certain positions, walking, worse in the morning (getting out of bed).  The pain is mitigated by medications (ibuprofen and steroid (one dose).  The patient reports 6 hours of uninterrupted sleep per night.    He has tried biofreeze without relief.  He was given a medrol dose pack, but his blood glucose went extremely high, so he stopped it. He also had a steroid injection at an ED which also severely increased his blood sugar.  He sees his endocrinologist for over 20 years and is currently on a long acting and short acting regimen.  He checks his sugars 10 times a day.    He was seen by Dr. Gonsalves on 5/25/2020 for knee pain and was given some knee rehab exercises.  He continues to participate in [employer] fitness.    Patient denies urinary incontinence, bowel incontinence, and significant motor weakness.    Physical Therapy/Home Exercise: no      Pain Disability Index Review:      10/4/2023     2:06 PM 5/25/2020     8:18 AM   Last 3 PDI Scores   Pain Disability Index (PDI) 44 35       Pain Medications:  Norco 7.5-325mg  Ibuprofen 800mg twice a day     report:  Reviewed and consistent with medication use as prescribed.    Pain Procedures:   None    Imaging:   None available    Past Medical History:   Diagnosis Date    Cataract     Diabetes mellitus     Diabetic retinopathy     Hypertension       Past Surgical History:   Procedure Laterality Date    CATARACT EXTRACTION Right 03/27/2022    Dr. Ricketts    INTRAOCULAR PROSTHESES INSERTION Right 3/27/2022    Procedure: INSERTION, IOL PROSTHESIS;  Surgeon: Divine Ricketts MD;  [place] [facility] [place] [address] [place] [address];  Service: Ophthalmology;  Laterality: Right;    INTRAOCULAR PROSTHESES INSERTION Left 5/15/2022    Procedure: INSERTION, IOL PROSTHESIS;  Surgeon: Divine Ricketts MD;  [place] [facility] [place] [address] [place] [address];  Service: Ophthalmology;  Laterality: Left;    PHACOEMULSIFICATION OF CATARACT Right 3/27/2022    Procedure: PHACOEMULSIFICATION, CATARACT;  Surgeon: Divine Ricketts MD;  [place] [facility] [place] [address] [place] [address];  Service: Ophthalmology;  Laterality: Right;    PHACOEMULSIFICATION OF CATARACT Left 5/15/2022    Procedure: PHACOEMULSIFICATION, CATARACT;  Surgeon: Divine Ricketts MD;  [place] [facility] [place] [address] [place] [address];  Service: Ophthalmology;  Laterality: Left;     Social History     Socioeconomic History    Marital status:    Tobacco Use    Smoking status: Never    Smokeless tobacco: Never   Substance and Sexual Activity    Alcohol use: No    Drug use: No    Sexual activity: Not Currently     Family History   Problem Relation Age of Onset    No Known Problems Mother     No Known Problems Father     Melanoma Neg Hx        Review of patient's allergies indicates:  No Known Allergies    Current Outpatient Medications   Medication Sig    atorvastatin (LIPITOR) 40 MG tablet Take 40 mg by mouth every evening.    diltiaZEM (CARDIZEM LA) 180 mg 24 hr tablet Take 180 mg by mouth once daily.    HUMALOG KWIKPEN INSULIN 100 unit/mL pen Inject into the skin.    HYDROcodone-acetaminophen (NORCO) 5-325 mg per tablet Take 1 tablet by mouth every 6 (six) hours as needed.    insulin glargine (LANTUS) 100 unit/mL injection Inject 22 Units into the skin once daily.    meloxicam (MOBIC) 15 MG tablet Take 15 mg by mouth.    montelukast (SINGULAIR) 10 mg tablet Take 1 tablet (10 mg total) by mouth  every evening.    fosinopril (MONOPRIL) 40 MG tablet Take 40 mg by mouth nightly.    triamcinolone acetonide 0.1% (KENALOG) 0.1 % cream Apply topically 2 (two) times daily. for 10 days     No current facility-administered medications for this visit.     Facility-Administered Medications Ordered in Other Visits   Medication    phenylephrine HCL 2.5% ophthalmic solution 1 drop    phenylephrine HCL 2.5% ophthalmic solution 1 drop    proparacaine 0.5 % ophthalmic solution 1 drop    proparacaine 0.5 % ophthalmic solution 1 drop    tropicamide 1% ophthalmic solution 1 drop    tropicamide 1% ophthalmic solution 1 drop       REVIEW OF SYSTEMS:    GENERAL:  No weight loss, malaise or fevers.  HEENT:  Negative for frequent or significant headaches.  NECK:  Negative for lumps, goiter, pain and significant neck swelling.  RESPIRATORY:  Negative for cough, wheezing or shortness of breath.  CARDIOVASCULAR:  Negative for chest pain, leg swelling or palpitations.  GI:  Negative for abdominal discomfort, blood in stools or black stools or change in bowel habits.  MUSCULOSKELETAL:  See HPI.  SKIN:  Negative for lesions, rash, and itching.  PSYCH:  Negative for sleep disturbance, mood disorder and recent psychosocial stressors.  HEMATOLOGY/LYMPHOLOGY:  Negative for prolonged bleeding, bruising easily or swollen nodes. +DM type 1  NEURO:   No history of headaches, syncope, paralysis, seizures or tremors.  All other reviewed and negative other than HPI.    OBJECTIVE:    BP (!) 197/96 (BP Location: Right arm, Patient Position: Sitting, BP Method: Medium (Automatic))   Pulse 73   Wt 74.4 kg (164 lb 0.4 oz)   BMI 24.94 kg/m²     PHYSICAL EXAMINATION:  General appearance: Well appearing, in no acute distress, alert and appropriately communicative.  Psych:  Mood and affect appropriate.  Skin: Skin color, texture, turgor normal, no rashes or lesions, in both upper and lower body.  Head/face:  Atraumatic, normocephalic.  Cor: regular  rate  Pulm: non-labored breathing  GI: Abdomen non-distended and non-tender.  Back: Straight leg raising in the sitting and supine positions is negative to radicular pain. No pain to palpation over the spine or paraspinal muscles. Normal range of motion without pain reproduction.  Extremities: Peripheral joint ROM is full and pain free without obvious instability or laxity in all four extremities. No deformities, edema, or skin discoloration. Good capillary refill.  Musculoskeletal:  hip, sacroiliac and knee provocative maneuvers are negative. Bilateral upper and lower extremity strength is normal and symmetric.  No atrophy or tone abnormalities are noted.  Neuro: Bilateral upper and lower extremity coordination and muscle stretch reflexes are physiologic and symmetric.  Negative Clonus. No loss of sensation is noted.  Gait: Normal.    Lab Results   Component Value Date    CREATININE 1.16 03/17/2017       ASSESSMENT: 73 y.o. year old male with lower back pain, consistent with:     1. Lumbar radiculopathy  X-Ray Lumbar Complete Including Flex And Ext    Ambulatory referral/consult to Physical/Occupational Therapy      2. Back pain, unspecified back location, unspecified back pain laterality, unspecified chronicity  X-Ray Lumbar Complete Including Flex And Ext    Ambulatory referral/consult to Physical/Occupational Therapy          IMPRESSION: Mr. Solomon is a 73 y.o gentleman who presents with lower back pain.  He was seen in our clinic over 3 years ago, however this was for left knee pain.  He states that his lower back started hurting about 6 weeks ago after doing some yard work.  He reports that initially his pain was radiating down the back of his legs toward his calf, however this has improved over the past several weeks.  He was trialed on a Medrol Dosepak, however he only completed 1 pill due to elevated blood sugars.  He does report that his pain improved after the 1 pill.  He continues to find relief with  taking ibuprofen.  He has not yet had any physical therapy.  We can start with conservative measures for now and consider interventions if he fails conservative measures.    PLAN:   - I have stressed the importance of physical activity and a home exercise plan to help with pain and improve health.  - Patient can continue with medications for now since they are providing benefits, using them appropriately, and without side effects.  - xray lumbar spine with flexion/extension  - referral to physical therapy for lower back and leg pain  - counseled on using tylenol (< 3000mg total) more than ibuprofen  - RTC 6-8 weeks  - Counseled patient regarding the importance of activity modification and physical therapy.    The above plan and management options were discussed at length with patient. Patient is in agreement with the above and verbalized understanding. It will be communicated with the referring physician via electronic record, fax, or mail.    Tomeka Lowry  10/04/2023

## 2023-10-09 ENCOUNTER — PATIENT MESSAGE (OUTPATIENT)
Dept: PAIN MEDICINE | Facility: CLINIC | Age: 73
End: 2023-10-09
Payer: MEDICARE

## 2023-10-09 DIAGNOSIS — M54.9 BACK PAIN, UNSPECIFIED BACK LOCATION, UNSPECIFIED BACK PAIN LATERALITY, UNSPECIFIED CHRONICITY: ICD-10-CM

## 2023-10-09 DIAGNOSIS — M54.16 LUMBAR RADICULOPATHY: Primary | ICD-10-CM

## 2023-10-09 NOTE — TELEPHONE ENCOUNTER
Order is in for external referral to physical therapy.  Please fax the referral per  Juanito's request.    Thanks!    Tomeka Lowry

## 2023-11-13 ENCOUNTER — PATIENT MESSAGE (OUTPATIENT)
Dept: PAIN MEDICINE | Facility: CLINIC | Age: 73
End: 2023-11-13
Payer: MEDICARE

## 2023-11-16 ENCOUNTER — OFFICE VISIT (OUTPATIENT)
Dept: PAIN MEDICINE | Facility: CLINIC | Age: 73
End: 2023-11-16
Payer: MEDICARE

## 2023-11-16 ENCOUNTER — PATIENT MESSAGE (OUTPATIENT)
Dept: PAIN MEDICINE | Facility: CLINIC | Age: 73
End: 2023-11-16
Payer: MEDICARE

## 2023-11-16 VITALS
DIASTOLIC BLOOD PRESSURE: 82 MMHG | SYSTOLIC BLOOD PRESSURE: 174 MMHG | BODY MASS INDEX: 24.94 KG/M2 | HEART RATE: 82 BPM | WEIGHT: 164 LBS

## 2023-11-16 DIAGNOSIS — M48.062 SPINAL STENOSIS OF LUMBAR REGION WITH NEUROGENIC CLAUDICATION: ICD-10-CM

## 2023-11-16 DIAGNOSIS — M47.819 ARTHROPATHY OF FACET JOINTS AT MULTIPLE LEVELS: ICD-10-CM

## 2023-11-16 DIAGNOSIS — M51.36 DDD (DEGENERATIVE DISC DISEASE), LUMBAR: ICD-10-CM

## 2023-11-16 DIAGNOSIS — M54.16 LUMBAR RADICULOPATHY: Primary | ICD-10-CM

## 2023-11-16 PROCEDURE — 1159F MED LIST DOCD IN RCRD: CPT | Mod: CPTII,S$GLB,, | Performed by: NURSE PRACTITIONER

## 2023-11-16 PROCEDURE — 99214 PR OFFICE/OUTPT VISIT, EST, LEVL IV, 30-39 MIN: ICD-10-PCS | Mod: S$GLB,,, | Performed by: NURSE PRACTITIONER

## 2023-11-16 PROCEDURE — 1125F AMNT PAIN NOTED PAIN PRSNT: CPT | Mod: CPTII,S$GLB,, | Performed by: NURSE PRACTITIONER

## 2023-11-16 PROCEDURE — 1160F PR REVIEW ALL MEDS BY PRESCRIBER/CLIN PHARMACIST DOCUMENTED: ICD-10-PCS | Mod: CPTII,S$GLB,, | Performed by: NURSE PRACTITIONER

## 2023-11-16 PROCEDURE — 99999 PR PBB SHADOW E&M-EST. PATIENT-LVL III: CPT | Mod: PBBFAC,,, | Performed by: NURSE PRACTITIONER

## 2023-11-16 PROCEDURE — 99214 OFFICE O/P EST MOD 30 MIN: CPT | Mod: S$GLB,,, | Performed by: NURSE PRACTITIONER

## 2023-11-16 PROCEDURE — 1160F RVW MEDS BY RX/DR IN RCRD: CPT | Mod: CPTII,S$GLB,, | Performed by: NURSE PRACTITIONER

## 2023-11-16 PROCEDURE — 3079F DIAST BP 80-89 MM HG: CPT | Mod: CPTII,S$GLB,, | Performed by: NURSE PRACTITIONER

## 2023-11-16 PROCEDURE — 3079F PR MOST RECENT DIASTOLIC BLOOD PRESSURE 80-89 MM HG: ICD-10-PCS | Mod: CPTII,S$GLB,, | Performed by: NURSE PRACTITIONER

## 2023-11-16 PROCEDURE — 3008F PR BODY MASS INDEX (BMI) DOCUMENTED: ICD-10-PCS | Mod: CPTII,S$GLB,, | Performed by: NURSE PRACTITIONER

## 2023-11-16 PROCEDURE — 3077F PR MOST RECENT SYSTOLIC BLOOD PRESSURE >= 140 MM HG: ICD-10-PCS | Mod: CPTII,S$GLB,, | Performed by: NURSE PRACTITIONER

## 2023-11-16 PROCEDURE — 3077F SYST BP >= 140 MM HG: CPT | Mod: CPTII,S$GLB,, | Performed by: NURSE PRACTITIONER

## 2023-11-16 PROCEDURE — 1159F PR MEDICATION LIST DOCUMENTED IN MEDICAL RECORD: ICD-10-PCS | Mod: CPTII,S$GLB,, | Performed by: NURSE PRACTITIONER

## 2023-11-16 PROCEDURE — 99999 PR PBB SHADOW E&M-EST. PATIENT-LVL III: ICD-10-PCS | Mod: PBBFAC,,, | Performed by: NURSE PRACTITIONER

## 2023-11-16 PROCEDURE — 3008F BODY MASS INDEX DOCD: CPT | Mod: CPTII,S$GLB,, | Performed by: NURSE PRACTITIONER

## 2023-11-16 PROCEDURE — 1125F PR PAIN SEVERITY QUANTIFIED, PAIN PRESENT: ICD-10-PCS | Mod: CPTII,S$GLB,, | Performed by: NURSE PRACTITIONER

## 2023-11-16 NOTE — H&P (VIEW-ONLY)
Chronic Pain - Established Clinic Visit    Referring Physician: No ref. provider found    Chief Complaint:   Chief Complaint   Patient presents with    Low-back Pain        SUBJECTIVE:    David Solomon presents to the clinic for the evaluation of lower back pain. The pain started 6 weeks ago following lawn work and symptoms have been worsening.The pain is located in the lower back area and radiates to the hamstrings and down to his calves.  The pain is described as stabbing and is rated as 7/10. The pain is rated with a score of  0/10 on the BEST day and a score of 9/10 on the WORST day.  Symptoms interfere with daily activity. The pain is exacerbated by certain positions, walking, worse in the morning (getting out of bed).  The pain is mitigated by medications (ibuprofen and steroid (one dose).  The patient reports 6 hours of uninterrupted sleep per night.    He has tried biofreeze without relief.  He was given a medrol dose pack, but his blood glucose went extremely high, so he stopped it. He also had a steroid injection at an ED which also severely increased his blood sugar.  He sees his endocrinologist for over 20 years and is currently on a long acting and short acting regimen.  He checks his sugars 10 times a day.    He was seen by Dr. Gonsalves on 5/25/2020 for knee pain and was given some knee rehab exercises.  He continues to participate in [employer].    Patient denies urinary incontinence, bowel incontinence, and significant motor weakness.    Physical Therapy/Home Exercise: no      Pain Disability Index Review:      11/16/2023     8:33 AM 10/4/2023     2:06 PM 5/25/2020     8:18 AM   Last 3 PDI Scores   Pain Disability Index (PDI) 52 44 35         Interval History 11/16/2023:  73-year-old male presents today for a follow-up appointment to discuss his lumbar MRI and be considered for pain interventions.  His pain continues to complain of  low back with radiating symptoms into his legs posteriorly  slightly pass his knees bilaterally.  He reports pulling and tightening symptoms in his legs bilaterally walking, standing and repositioning his body increases his pain.   He denies any profound weakness denies any recent incident or trauma denies any bowel bladder dysfunction, denies any saddle anesthesia at this time.    Pain Medications:  Norco 7.5-325mg  Ibuprofen 800mg twice a day     report:  Reviewed and consistent with medication use as prescribed.    Pain Procedures:   None    Imaging:   None available    Past Medical History:   Diagnosis Date    Cataract     Diabetes mellitus     Diabetic retinopathy     Hypertension      Past Surgical History:   Procedure Laterality Date    CATARACT EXTRACTION Right 03/27/2022    Dr. Ricketts    INTRAOCULAR PROSTHESES INSERTION Right 3/27/2022    Procedure: INSERTION, IOL PROSTHESIS;  Surgeon: Divine Ricketts MD;  Location: [facility] OR [address] [place] [address];  Service: Ophthalmology;  Laterality: Right;    INTRAOCULAR PROSTHESES INSERTION Left 5/15/2022    Procedure: INSERTION, IOL PROSTHESIS;  Surgeon: Divine Ricketts MD;  Location: [facility] OR [address] [place] [address];  Service: Ophthalmology;  Laterality: Left;    PHACOEMULSIFICATION OF CATARACT Right 3/27/2022    Procedure: PHACOEMULSIFICATION, CATARACT;  Surgeon: Divine Ricketts MD;  Location: [facility] OR [address] [place] [address];  Service: Ophthalmology;  Laterality: Right;    PHACOEMULSIFICATION OF CATARACT Left 5/15/2022    Procedure: PHACOEMULSIFICATION, CATARACT;  Surgeon: Divine Ricketts MD;  Location: [facility] OR [address] [place] [address];  Service: Ophthalmology;  Laterality: Left;     Social History     Socioeconomic History    Marital status:    Tobacco Use    Smoking status: Never    Smokeless tobacco: Never   Substance and Sexual Activity    Alcohol use: No    Drug use: No    Sexual activity: Not Currently     Family History   Problem Relation Age of Onset    No Known Problems Mother     No Known Problems Father     Melanoma Neg Hx        Review of patient's allergies  indicates:  No Known Allergies    Current Outpatient Medications   Medication Sig    atorvastatin (LIPITOR) 40 MG tablet Take 40 mg by mouth every evening.    diltiaZEM (CARDIZEM LA) 180 mg 24 hr tablet Take 180 mg by mouth once daily.    HUMALOG KWIKPEN INSULIN 100 unit/mL pen Inject into the skin.    insulin glargine (LANTUS) 100 unit/mL injection Inject 22 Units into the skin once daily.    meloxicam (MOBIC) 15 MG tablet Take 15 mg by mouth.    fosinopril (MONOPRIL) 40 MG tablet Take 40 mg by mouth nightly.    HYDROcodone-acetaminophen (NORCO) 5-325 mg per tablet Take 1 tablet by mouth every 6 (six) hours as needed.    triamcinolone acetonide 0.1% (KENALOG) 0.1 % cream Apply topically 2 (two) times daily. for 10 days     No current facility-administered medications for this visit.     Facility-Administered Medications Ordered in Other Visits   Medication    phenylephrine HCL 2.5% ophthalmic solution 1 drop    phenylephrine HCL 2.5% ophthalmic solution 1 drop    proparacaine 0.5 % ophthalmic solution 1 drop    proparacaine 0.5 % ophthalmic solution 1 drop    tropicamide 1% ophthalmic solution 1 drop    tropicamide 1% ophthalmic solution 1 drop       REVIEW OF SYSTEMS:    GENERAL:  No weight loss, malaise or fevers.  HEENT:  Negative for frequent or significant headaches.  NECK:  Negative for lumps, goiter, pain and significant neck swelling.  RESPIRATORY:  Negative for cough, wheezing or shortness of breath.  CARDIOVASCULAR:  Negative for chest pain, leg swelling or palpitations.  GI:  Negative for abdominal discomfort, blood in stools or black stools or change in bowel habits.  MUSCULOSKELETAL:  See HPI.  SKIN:  Negative for lesions, rash, and itching.  PSYCH:  Negative for sleep disturbance, mood disorder and recent psychosocial stressors.  HEMATOLOGY/LYMPHOLOGY:  Negative for prolonged bleeding, bruising easily or swollen nodes. +DM type 1  NEURO:   No history of headaches, syncope, paralysis, seizures or  tremors.  All other reviewed and negative other than HPI.    OBJECTIVE:    BP (!) 174/82   Pulse 82   Wt 74.4 kg (164 lb 0.4 oz)   BMI 24.94 kg/m²     PHYSICAL EXAMINATION:  General appearance: Well appearing, in no acute distress, alert and appropriately communicative.  Psych:  Mood and affect appropriate.  Skin: Skin color, texture, turgor normal, no rashes or lesions, in both upper and lower body.  Head/face:  Atraumatic, normocephalic.  Cor: regular rate  Pulm: non-labored breathing  GI: Abdomen non-distended and non-tender.  Back: Straight leg raising in the sitting and supine positions is negative to radicular pain. No pain to palpation over the spine or paraspinal muscles. Normal range of motion without pain reproduction.  Extremities: Peripheral joint ROM is full and pain free without obvious instability or laxity in all four extremities. No deformities, edema, or skin discoloration. Good capillary refill.  Musculoskeletal:  hip, sacroiliac and knee provocative maneuvers are negative. Bilateral upper and lower extremity strength is normal and symmetric.  No atrophy or tone abnormalities are noted.  Neuro: Bilateral upper and lower extremity coordination and muscle stretch reflexes are physiologic and symmetric.  Negative Clonus. No loss of sensation is noted.  Gait: Normal.    Lab Results   Component Value Date    CREATININE 1.16 03/17/2017       ASSESSMENT: 73 y.o. year old male with lower back pain, consistent with:     1. Lumbar radiculopathy        2. DDD (degenerative disc disease), lumbar        3. Arthropathy of facet joints at multiple levels        4. Spinal stenosis of lumbar region with neurogenic claudication              IMPRESSION: Mr. Solomon is a 73 y.o gentleman who presents with lower back pain.  He was seen in our clinic over 3 years ago, however this was for left knee pain.  He states that his lower back started hurting about 6 weeks ago after doing some yard work.  He reports that  initially his pain was radiating down the back of his legs toward his calf, however this has improved over the past several weeks.  He was trialed on a Medrol Dosepak, however he only completed 1 pill due to elevated blood sugars.  He does report that his pain improved after the 1 pill.  He continues to find relief with taking ibuprofen.  He has not yet had any physical therapy.  We can start with conservative measures for now and consider interventions if he fails conservative measures.    11/16/2023 -David Solomon is a 73 y.o. male who  has a past medical history of Cataract, Diabetes mellitus, Diabetic retinopathy, and Hypertension.  By history and examination this patient has chronic low back pain with radiculopathy.  The underlying cause cause is facet arthritis, degenerative disc disease, foraminal stenosis, and central canal stenosis.  Pathology is confirmed by imaging.  We discussed the underlying diagnoses and multiple treatment options including non-opioid medications, interventional procedures, home exercise, core muscle enhancement, and activity modification.  The risks and benefits of each treatment option were discussed and all questions were answered.       His lumbar MRI is significant for multilevel circumferential disc bulges at L4-5 he has severe bilateral neural foraminal narrowing and moderate to severe spinal canal stenosis.  The AP diameter spinal canal is narrowed by greater than 50% measuring 5 mm.  I recommended to schedule this patient for a lumbar JUAN targeting L5-S1 in effort to have no issues with entering the epidural space.    PLAN:   - I have stressed the importance of physical activity and a home exercise plan to help with pain and improve health.  -  Schedule patient for lumbar JUAN targeting L5-S1 levels may change upon exam per Dr. Lowry  - Patient can continue with medications for now since they are providing benefits, using them appropriately, and without side effects.  -    Previous imaging reviewed and discussed with patient in detail today.  -  Continue physical therapy for lower back and leg pain  - counseled on using tylenol (< 3000mg total) more than ibuprofen  - RTC  2-3 weeks after injection  - Counseled patient regarding the importance of activity modification and physical therapy.    The above plan and management options were discussed at length with patient. Patient is in agreement with the above and verbalized understanding. It will be communicated with the referring physician via electronic record, fax, or mail.    CLARE Salter  Interventional Pain Management    11/16/2023

## 2023-11-16 NOTE — PROGRESS NOTES
Chronic Pain - Established Clinic Visit    Referring Physician: No ref. provider found    Chief Complaint:   Chief Complaint   Patient presents with    Low-back Pain        SUBJECTIVE:    David Solomon presents to the clinic for the evaluation of lower back pain. The pain started 6 weeks ago following lawn work and symptoms have been worsening.The pain is located in the lower back area and radiates to the hamstrings and down to his calves.  The pain is described as stabbing and is rated as 7/10. The pain is rated with a score of  0/10 on the BEST day and a score of 9/10 on the WORST day.  Symptoms interfere with daily activity. The pain is exacerbated by certain positions, walking, worse in the morning (getting out of bed).  The pain is mitigated by medications (ibuprofen and steroid (one dose).  The patient reports 6 hours of uninterrupted sleep per night.    He has tried biofreeze without relief.  He was given a medrol dose pack, but his blood glucose went extremely high, so he stopped it. He also had a steroid injection at an ED which also severely increased his blood sugar.  He sees his endocrinologist for over 20 years and is currently on a long acting and short acting regimen.  He checks his sugars 10 times a day.    He was seen by Dr. Gonsalves on 5/25/2020 for knee pain and was given some knee rehab exercises.  He continues to participate in [employer].    Patient denies urinary incontinence, bowel incontinence, and significant motor weakness.    Physical Therapy/Home Exercise: no      Pain Disability Index Review:      11/16/2023     8:33 AM 10/4/2023     2:06 PM 5/25/2020     8:18 AM   Last 3 PDI Scores   Pain Disability Index (PDI) 52 44 35         Interval History 11/16/2023:  73-year-old male presents today for a follow-up appointment to discuss his lumbar MRI and be considered for pain interventions.  His pain continues to complain of  low back with radiating symptoms into his legs posteriorly  slightly pass his knees bilaterally.  He reports pulling and tightening symptoms in his legs bilaterally walking, standing and repositioning his body increases his pain.   He denies any profound weakness denies any recent incident or trauma denies any bowel bladder dysfunction, denies any saddle anesthesia at this time.    Pain Medications:  Norco 7.5-325mg  Ibuprofen 800mg twice a day     report:  Reviewed and consistent with medication use as prescribed.    Pain Procedures:   None    Imaging:   None available    Past Medical History:   Diagnosis Date    Cataract     Diabetes mellitus     Diabetic retinopathy     Hypertension      Past Surgical History:   Procedure Laterality Date    CATARACT EXTRACTION Right 03/27/2022    Dr. Ricketts    INTRAOCULAR PROSTHESES INSERTION Right 3/27/2022    Procedure: INSERTION, IOL PROSTHESIS;  Surgeon: Divine Ricketts MD;  Location: [facility] OR [address] [place] [address];  Service: Ophthalmology;  Laterality: Right;    INTRAOCULAR PROSTHESES INSERTION Left 5/15/2022    Procedure: INSERTION, IOL PROSTHESIS;  Surgeon: Divine Ricketts MD;  Location: [facility] OR [address] [place] [address];  Service: Ophthalmology;  Laterality: Left;    PHACOEMULSIFICATION OF CATARACT Right 3/27/2022    Procedure: PHACOEMULSIFICATION, CATARACT;  Surgeon: Divine Ricketts MD;  Location: [facility] OR [address] [place] [address];  Service: Ophthalmology;  Laterality: Right;    PHACOEMULSIFICATION OF CATARACT Left 5/15/2022    Procedure: PHACOEMULSIFICATION, CATARACT;  Surgeon: Divine Ricketts MD;  Location: [facility] OR [address] [place] [address];  Service: Ophthalmology;  Laterality: Left;     Social History     Socioeconomic History    Marital status:    Tobacco Use    Smoking status: Never    Smokeless tobacco: Never   Substance and Sexual Activity    Alcohol use: No    Drug use: No    Sexual activity: Not Currently     Family History   Problem Relation Age of Onset    No Known Problems Mother     No Known Problems Father     Melanoma Neg Hx        Review of patient's allergies  indicates:  No Known Allergies    Current Outpatient Medications   Medication Sig    atorvastatin (LIPITOR) 40 MG tablet Take 40 mg by mouth every evening.    diltiaZEM (CARDIZEM LA) 180 mg 24 hr tablet Take 180 mg by mouth once daily.    HUMALOG KWIKPEN INSULIN 100 unit/mL pen Inject into the skin.    insulin glargine (LANTUS) 100 unit/mL injection Inject 22 Units into the skin once daily.    meloxicam (MOBIC) 15 MG tablet Take 15 mg by mouth.    fosinopril (MONOPRIL) 40 MG tablet Take 40 mg by mouth nightly.    HYDROcodone-acetaminophen (NORCO) 5-325 mg per tablet Take 1 tablet by mouth every 6 (six) hours as needed.    triamcinolone acetonide 0.1% (KENALOG) 0.1 % cream Apply topically 2 (two) times daily. for 10 days     No current facility-administered medications for this visit.     Facility-Administered Medications Ordered in Other Visits   Medication    phenylephrine HCL 2.5% ophthalmic solution 1 drop    phenylephrine HCL 2.5% ophthalmic solution 1 drop    proparacaine 0.5 % ophthalmic solution 1 drop    proparacaine 0.5 % ophthalmic solution 1 drop    tropicamide 1% ophthalmic solution 1 drop    tropicamide 1% ophthalmic solution 1 drop       REVIEW OF SYSTEMS:    GENERAL:  No weight loss, malaise or fevers.  HEENT:  Negative for frequent or significant headaches.  NECK:  Negative for lumps, goiter, pain and significant neck swelling.  RESPIRATORY:  Negative for cough, wheezing or shortness of breath.  CARDIOVASCULAR:  Negative for chest pain, leg swelling or palpitations.  GI:  Negative for abdominal discomfort, blood in stools or black stools or change in bowel habits.  MUSCULOSKELETAL:  See HPI.  SKIN:  Negative for lesions, rash, and itching.  PSYCH:  Negative for sleep disturbance, mood disorder and recent psychosocial stressors.  HEMATOLOGY/LYMPHOLOGY:  Negative for prolonged bleeding, bruising easily or swollen nodes. +DM type 1  NEURO:   No history of headaches, syncope, paralysis, seizures or  tremors.  All other reviewed and negative other than HPI.    OBJECTIVE:    BP (!) 174/82   Pulse 82   Wt 74.4 kg (164 lb 0.4 oz)   BMI 24.94 kg/m²     PHYSICAL EXAMINATION:  General appearance: Well appearing, in no acute distress, alert and appropriately communicative.  Psych:  Mood and affect appropriate.  Skin: Skin color, texture, turgor normal, no rashes or lesions, in both upper and lower body.  Head/face:  Atraumatic, normocephalic.  Cor: regular rate  Pulm: non-labored breathing  GI: Abdomen non-distended and non-tender.  Back: Straight leg raising in the sitting and supine positions is negative to radicular pain. No pain to palpation over the spine or paraspinal muscles. Normal range of motion without pain reproduction.  Extremities: Peripheral joint ROM is full and pain free without obvious instability or laxity in all four extremities. No deformities, edema, or skin discoloration. Good capillary refill.  Musculoskeletal:  hip, sacroiliac and knee provocative maneuvers are negative. Bilateral upper and lower extremity strength is normal and symmetric.  No atrophy or tone abnormalities are noted.  Neuro: Bilateral upper and lower extremity coordination and muscle stretch reflexes are physiologic and symmetric.  Negative Clonus. No loss of sensation is noted.  Gait: Normal.    Lab Results   Component Value Date    CREATININE 1.16 03/17/2017       ASSESSMENT: 73 y.o. year old male with lower back pain, consistent with:     1. Lumbar radiculopathy        2. DDD (degenerative disc disease), lumbar        3. Arthropathy of facet joints at multiple levels        4. Spinal stenosis of lumbar region with neurogenic claudication              IMPRESSION: Mr. Solomon is a 73 y.o gentleman who presents with lower back pain.  He was seen in our clinic over 3 years ago, however this was for left knee pain.  He states that his lower back started hurting about 6 weeks ago after doing some yard work.  He reports that  initially his pain was radiating down the back of his legs toward his calf, however this has improved over the past several weeks.  He was trialed on a Medrol Dosepak, however he only completed 1 pill due to elevated blood sugars.  He does report that his pain improved after the 1 pill.  He continues to find relief with taking ibuprofen.  He has not yet had any physical therapy.  We can start with conservative measures for now and consider interventions if he fails conservative measures.    11/16/2023 -David Solomon is a 73 y.o. male who  has a past medical history of Cataract, Diabetes mellitus, Diabetic retinopathy, and Hypertension.  By history and examination this patient has chronic low back pain with radiculopathy.  The underlying cause cause is facet arthritis, degenerative disc disease, foraminal stenosis, and central canal stenosis.  Pathology is confirmed by imaging.  We discussed the underlying diagnoses and multiple treatment options including non-opioid medications, interventional procedures, home exercise, core muscle enhancement, and activity modification.  The risks and benefits of each treatment option were discussed and all questions were answered.       His lumbar MRI is significant for multilevel circumferential disc bulges at L4-5 he has severe bilateral neural foraminal narrowing and moderate to severe spinal canal stenosis.  The AP diameter spinal canal is narrowed by greater than 50% measuring 5 mm.  I recommended to schedule this patient for a lumbar JUAN targeting L5-S1 in effort to have no issues with entering the epidural space.    PLAN:   - I have stressed the importance of physical activity and a home exercise plan to help with pain and improve health.  -  Schedule patient for lumbar JUAN targeting L5-S1 levels may change upon exam per Dr. Lowry  - Patient can continue with medications for now since they are providing benefits, using them appropriately, and without side effects.  -    Previous imaging reviewed and discussed with patient in detail today.  -  Continue physical therapy for lower back and leg pain  - counseled on using tylenol (< 3000mg total) more than ibuprofen  - RTC  2-3 weeks after injection  - Counseled patient regarding the importance of activity modification and physical therapy.    The above plan and management options were discussed at length with patient. Patient is in agreement with the above and verbalized understanding. It will be communicated with the referring physician via electronic record, fax, or mail.    CLARE Salter  Interventional Pain Management    11/16/2023

## 2023-11-17 ENCOUNTER — PATIENT MESSAGE (OUTPATIENT)
Dept: PAIN MEDICINE | Facility: CLINIC | Age: 73
End: 2023-11-17
Payer: MEDICARE

## 2023-11-17 ENCOUNTER — TELEPHONE (OUTPATIENT)
Dept: PAIN MEDICINE | Facility: CLINIC | Age: 73
End: 2023-11-17
Payer: MEDICARE

## 2023-11-17 DIAGNOSIS — M54.16 LUMBAR RADICULOPATHY: Primary | ICD-10-CM

## 2023-11-17 NOTE — TELEPHONE ENCOUNTER
----- Message from Kanika Mirza MD sent at 2023  3:37 PM CST -----  Regarding: Order for SCOTTIE WILSON    Patient Name: SCOTTIE WILSON([id number])  Sex: Male  : 1950      PCP: DOMINIC, PRIMARY DOCTOR    Center: [facility]     Types of orders made on 2023: Procedure Request    Order Date:2023  Ordering User:KANIKA MIRZA [[id number]]  Encounter Provider:Luís Blancas FNP [[id number]]  Authorizing Provider:   Kanika Mirza MD [[id number]]  Department:[facility] PAIN MANAGEMENT[[id number]]    Common Order Information  Procedure -> Epidural Injection (specify level) Cmt: L5/S1 LESI    Order Specific Information  Order: Procedure Order to Pain Management [Custom: [id number]]  Order #:          [id number]: 1 FUTURE    Priority: Routine  Class: Clinic Performed    Future Order Information      Expires on:2024              Expected by:2023                   Associated Diagnoses      M54.16 Lumbar radiculopathy      Physician -> YUKI         Is patient on anti-coagulants? -> No         [facility]         Follow-up: -> 1 week           Priority: Routine  Class: Clinic Performed    Future Order Information      Expires on:2024            Expected by:2023                   Associated Diag  noses      M54.16 Lumbar radiculopathy      Procedure -> Epidural Injection (specify level) Cmt: L5/S1 LESI        Physician -> YUKI         Is patient on anti-coagulants? -> No         [facility]         Follow-up: -> 1 week

## 2023-11-30 NOTE — PRE-PROCEDURE INSTRUCTIONS
The following was discussed with pt via phone and sent to pt portal. Pt verbalized understanding.    Dear David ,  You are scheduled for a pain procedure on 12/01 with Dr. Lowry, please report to [facility] [address] [facility].     Please park in the lot in front of the building and enter at the main entrance. Proceed to the second floor for registration.     Arrival time: 1230     *Refrain from drinking any alcohol  *You may not drive home after your procedure.  *Please insure that you have pre-planned your ride home.    *You may not leave alone in an uber, taxi, etc. if you have received sedating medications by mouth or by vein  *You must be discharged to a responsible adult.   *Please remain under adult supervision for 24 hours.   If possible, please have your visitor &/or ride home stay during your visit.   The surgeon should speak with your visitors after your procedure.  All visitors must be 18 years of age or older. Please limit your visitors to max of 2 people.     Day of Procedure  No food, no drink 8 hours prior to your arrival time, or 6 hours if diabetic.      *You should take any medications that you routinely take for blood pressure, heart medications, thyroid, cholesterol, etc with small sips of water.   *If you are a diabetic, do not take your medication if you will be fasting, but bring it with you.   *Please plan on being here for roughly 2 hours.   *Hold vitamins and supplements the morning of your procedure.  *Hold any Anticoagulants (ex. Aspirin, goody or BC powder, Coumadin, Eliquis, Heparin, Plavix, Lovenox, Xarelto, etc.)  *Hold any non-insulin injections until after procedure (Ozempic, Mounjaro, Trulicity, Victoza, Byetta, Wegovy, Adlyxin, etc) (you should have been instructed to hold for a 7 day period)     Shower the night before and the morning of your procedure with antibacterial soap.   Please do not use antibacterial soap to wash your face. Use your regular face  soap.  Do not apply any products to your skin nor your hair after you shower the morning of your procedure.         Products include: lotions, oils, ointments, creams, gels, powders, lotion, deodorant, make-up, perfume/cologne, after shave and sunscreen.     Wear loose, comfortable clothing.  No jewelry. No contacts. Bring glasses if necessary.  If you have dentures, please bring a case.  Please leave all jewelry and valuables at home.     ---If you start to feel sick (fever, chills, coughing, sore throat, etc) or start on or have been taking any antibiotics, please contact your doctor's office at [phone] your procedure would have to be rescheduled.      Thanks,  Catina, LPN [facility]  Pre-Admit Dept

## 2023-11-30 NOTE — DISCHARGE INSTRUCTIONS
Home Care Instructions Pain Management:     1.  DIET:     You may resume your normal diet today.     2.  BATHING:     You may shower with luke warm water.     3.  DRESSING:     You may remove your bandage today.     4.  ACTIVITY LEVEL:       You may resume your normal activities 24 hours after your procedure.     5.  MEDICATIONS:     You may resume your normal medications today.     6.  SPECIAL INSTRUCTIONS:     No heat to the injection site for 24 hours including bath or shower, heating pad, moist heat or hot tubs.     Use an ice pack to the injection site for any pain or discomfort.  Apply ice packs for 20 minute intervals as needed.     If you have received any sedatives by mouth today, you can not drive for 12 hours.     If you have received sedation through an IV, you can not drive for 24 hours.     PLEASE CALL YOUR DOCTOR FOR THE FOLLOWIN.  Redness or swelling around the injection site.  2.  Fever of 101 degrees.  3.  Drainage (pus) from the injection site.  4.  For any continuous bleeding (some dried blood over the incision is normal.)     FOR EMERGENCIES:     If any unusual problems or difficulties occur during clinic hours, call [phone] or dial [phone].     Follow up with with your physician in 2-3 weeks.         yes

## 2023-12-01 ENCOUNTER — TELEPHONE (OUTPATIENT)
Dept: PAIN MEDICINE | Facility: CLINIC | Age: 73
End: 2023-12-01
Payer: MEDICARE

## 2023-12-01 ENCOUNTER — HOSPITAL ENCOUNTER (OUTPATIENT)
Facility: HOSPITAL | Age: 73
Discharge: HOME | End: 2023-12-01
Attending: STUDENT IN AN ORGANIZED HEALTH CARE EDUCATION/TRAINING PROGRAM | Admitting: STUDENT IN AN ORGANIZED HEALTH CARE EDUCATION/TRAINING PROGRAM
Payer: MEDICARE

## 2023-12-01 VITALS
HEART RATE: 69 BPM | BODY MASS INDEX: 24.71 KG/M2 | DIASTOLIC BLOOD PRESSURE: 73 MMHG | TEMPERATURE: 98 F | HEIGHT: 68 IN | WEIGHT: 163 LBS | OXYGEN SATURATION: 99 % | SYSTOLIC BLOOD PRESSURE: 183 MMHG | RESPIRATION RATE: 18 BRPM

## 2023-12-01 DIAGNOSIS — M54.16 LUMBAR RADICULOPATHY: Primary | ICD-10-CM

## 2023-12-01 DIAGNOSIS — G89.29 CHRONIC PAIN: ICD-10-CM

## 2023-12-01 LAB — POCT GLUCOSE: 150 MG/DL (ref 70–110)

## 2023-12-01 PROCEDURE — 62323 NJX INTERLAMINAR LMBR/SAC: CPT | Mod: ,,, | Performed by: STUDENT IN AN ORGANIZED HEALTH CARE EDUCATION/TRAINING PROGRAM

## 2023-12-01 PROCEDURE — 62323 PR INJ LUMBAR/SACRAL, W/IMAGING GUIDANCE: ICD-10-PCS | Mod: ,,, | Performed by: STUDENT IN AN ORGANIZED HEALTH CARE EDUCATION/TRAINING PROGRAM

## 2023-12-01 PROCEDURE — 25500020 PHARM REV CODE 255: Performed by: STUDENT IN AN ORGANIZED HEALTH CARE EDUCATION/TRAINING PROGRAM

## 2023-12-01 PROCEDURE — 25000003 PHARM REV CODE 250: Performed by: STUDENT IN AN ORGANIZED HEALTH CARE EDUCATION/TRAINING PROGRAM

## 2023-12-01 PROCEDURE — 63600175 PHARM REV CODE 636 W HCPCS: Performed by: STUDENT IN AN ORGANIZED HEALTH CARE EDUCATION/TRAINING PROGRAM

## 2023-12-01 PROCEDURE — 62323 NJX INTERLAMINAR LMBR/SAC: CPT | Performed by: STUDENT IN AN ORGANIZED HEALTH CARE EDUCATION/TRAINING PROGRAM

## 2023-12-01 RX ORDER — IRBESARTAN 75 MG/1
75 TABLET ORAL NIGHTLY
COMMUNITY

## 2023-12-01 RX ORDER — LIDOCAINE HYDROCHLORIDE 20 MG/ML
INJECTION, SOLUTION EPIDURAL; INFILTRATION; INTRACAUDAL; PERINEURAL
Status: DISCONTINUED | OUTPATIENT
Start: 2023-12-01 | End: 2023-12-01 | Stop reason: HOSPADM

## 2023-12-01 RX ORDER — ALPRAZOLAM 0.5 MG/1
0.5 TABLET, ORALLY DISINTEGRATING ORAL ONCE AS NEEDED
Status: COMPLETED | OUTPATIENT
Start: 2023-12-01 | End: 2023-12-01

## 2023-12-01 RX ORDER — LIDOCAINE HYDROCHLORIDE 10 MG/ML
INJECTION, SOLUTION EPIDURAL; INFILTRATION; INTRACAUDAL; PERINEURAL
Status: DISCONTINUED | OUTPATIENT
Start: 2023-12-01 | End: 2023-12-01 | Stop reason: HOSPADM

## 2023-12-01 RX ORDER — DEXAMETHASONE SODIUM PHOSPHATE 10 MG/ML
INJECTION INTRAMUSCULAR; INTRAVENOUS
Status: DISCONTINUED | OUTPATIENT
Start: 2023-12-01 | End: 2023-12-01 | Stop reason: HOSPADM

## 2023-12-01 RX ORDER — HYDROCHLOROTHIAZIDE 12.5 MG/1
12.5 TABLET ORAL DAILY
COMMUNITY

## 2023-12-01 RX ADMIN — ALPRAZOLAM 0.5 MG: 0.5 TABLET, ORALLY DISINTEGRATING ORAL at 01:12

## 2023-12-01 NOTE — INTERVAL H&P NOTE
The patient was examined and no significant changes were noted from the updated H&P or last clinic note.    The risks and benefits of this procedure, including alternative therapies, were discussed with the patient.  The discussion of risks included infection, bleeding, need for additional procedures or surgery, nerve damage, paralysis, adverse medication reaction(s), stroke, and if appropriate for the procedure, death.  Questions regarding the procedure, risks, expected outcome, and possible side effects were solicited and answered to David's satisfaction.  David Juanito wishes to proceed with the injection or procedure as confirmed by written consent.

## 2023-12-01 NOTE — DISCHARGE SUMMARY
Discharge Note  Short Stay      SUMMARY     Admit Date: 12/1/2023    Attending Physician: Tomeka Lowry MD PhD    Discharge Physician: Tomeka Lowry      Discharge Date: 12/1/2023 2:16 PM    Procedure(s) (LRB):  L5-S1 LESI (N/A)    Final Diagnosis: Lumbar radiculopathy [M54.16]    Disposition: Home or self care    Patient Instructions:   Current Discharge Medication List        CONTINUE these medications which have NOT CHANGED    Details   atorvastatin (LIPITOR) 40 MG tablet Take 40 mg by mouth every evening.      diltiaZEM (CARDIZEM LA) 180 mg 24 hr tablet Take 180 mg by mouth once daily.      HUMALOG KWIKPEN INSULIN 100 unit/mL pen Inject into the skin.      hydroCHLOROthiazide (HYDRODIURIL) 12.5 MG Tab Take 12.5 mg by mouth once daily.      insulin glargine (LANTUS) 100 unit/mL injection Inject 22 Units into the skin once daily.      irbesartan (AVAPRO) 75 MG tablet Take 75 mg by mouth every evening.      meloxicam (MOBIC) 15 MG tablet Take 15 mg by mouth.           STOP taking these medications       fosinopril (MONOPRIL) 40 MG tablet Comments:   Reason for Stopping:         HYDROcodone-acetaminophen (NORCO) 5-325 mg per tablet Comments:   Reason for Stopping:         triamcinolone acetonide 0.1% (KENALOG) 0.1 % cream Comments:   Reason for Stopping:                   Discharge Diagnosis: Lumbar radiculopathy [M54.16]  Condition on Discharge: Stable with no complications to procedure   Diet on Discharge: Same as before.  Activity: as per instruction sheet.  Discharge to: Home with a responsible adult.  Follow up: 2-4 weeks       Please call my office or pager at [phone] if experienced any weakness or loss of sensation, fever > 101.5, pain uncontrolled with oral medications, persistent nausea/vomiting/or diarrhea, redness or drainage from the incisions, or any other worrisome concerns. If physician on call was not reached or could not communicate with our office for any reason please go to the nearest emergency  department      Tomeka Lowry MD PhD

## 2023-12-01 NOTE — OP NOTE
Lumbar Interlaminar Epidural Steroid Injection under Fluoroscopic Guidance    The procedure, risks, benefits, and options were discussed with the patient. There are no contraindications to the procedure. The patent expressed understanding and agreed to the procedure. Informed written consent was obtained prior to the start of the procedure and can be found in the patient's chart.    PATIENT NAME: David Solomon   MRN: [id number]     DATE OF PROCEDURE: 12/01/2023    PROCEDURE: Lumbar Interlaminar Epidural Steroid Injection L5/S1 under Fluoroscopic Guidance    PRE-OP DIAGNOSIS: Lumbar radiculopathy [M54.16] Lumbar radiculopathy [M54.16]    POST-OP DIAGNOSIS: Same    PHYSICIAN: Tomeka Lowry MD    ASSISTANTS: None     MEDICATIONS INJECTED: Preservative-free Decadron 10mg with 4cc of Lidocaine 1% MPF and preservative free normal saline    LOCAL ANESTHETIC INJECTED: Xylocaine 2%     SEDATION: None    ESTIMATED BLOOD LOSS: None    COMPLICATIONS: None    TECHNIQUE: Time-out was performed to identify the patient and procedure to be performed. With the patient laying in a prone position, the surgical area was prepped and draped in the usual sterile fashion using ChloraPrep and a fenestrated drape. The level was determined under fluoroscopy guidance. Skin anesthesia was achieved by injecting Lidocaine 2% over the injection site. The interlaminar space was then approached with a 20 gauge,  3.5 inch Tuohy needle that was introduced under fluoroscopic guidance in the AP, lateral and/or contralateral oblique imaging. Once the Ligamentum flavum was encountered loss of resistance to saline was used to enter the epidural space. With positive loss of resistance and negative aspiration for CSF or Blood, contrast dye Omnipaque (240mg/mL) was injected to confirm placement and there was no vascular runoff. 5 mL of the medication mixture listed above was injected slowly. Displacement of the radio opaque contrast after injection of the  medication confirmed that the medication went into the area of the epidural space. The needles were removed and bleeding was nil. A sterile dressing was applied. No specimens collected. The patient tolerated the procedure well.     The patient was monitored after the procedure in the recovery area. They were given post-procedure and discharge instructions to follow at home. The patient was discharged in a stable condition.    Tomeka Lowry MD

## 2023-12-04 ENCOUNTER — PATIENT MESSAGE (OUTPATIENT)
Dept: PAIN MEDICINE | Facility: CLINIC | Age: 73
End: 2023-12-04
Payer: MEDICARE

## 2023-12-06 ENCOUNTER — PATIENT MESSAGE (OUTPATIENT)
Dept: PAIN MEDICINE | Facility: CLINIC | Age: 73
End: 2023-12-06
Payer: MEDICARE

## 2023-12-08 ENCOUNTER — PATIENT MESSAGE (OUTPATIENT)
Dept: PAIN MEDICINE | Facility: CLINIC | Age: 73
End: 2023-12-08
Payer: MEDICARE

## 2023-12-11 ENCOUNTER — TELEPHONE (OUTPATIENT)
Dept: SPINE | Facility: CLINIC | Age: 73
End: 2023-12-11
Payer: MEDICARE

## 2023-12-11 NOTE — TELEPHONE ENCOUNTER
Staff left pt a message regarding scheduling appt with MD Marivel. staff informed pt to contact office at [phone] to be scheduled.

## 2023-12-12 ENCOUNTER — TELEPHONE (OUTPATIENT)
Dept: SPINE | Facility: CLINIC | Age: 73
End: 2023-12-12
Payer: MEDICARE

## 2023-12-12 NOTE — TELEPHONE ENCOUNTER
Staff spoke with the pt regarding getting appt scheduled with MD Marivel. Pt appt was scheduled to 12/13/23 for 3 PM.

## 2023-12-12 NOTE — TELEPHONE ENCOUNTER
----- Message from Lito Schultz sent at 12/11/2023  3:29 PM CST -----  Regarding: RETURN CALL  Who Called: SCOTTIE WILSON [[phone]]        Who Left Message for Patient:fritz        Does the patient know what this is regarding?yes        Best Call Back Number:[phone]        Additional Information:

## 2023-12-13 ENCOUNTER — OFFICE VISIT (OUTPATIENT)
Dept: SPINE | Facility: CLINIC | Age: 73
End: 2023-12-13
Payer: MEDICARE

## 2023-12-13 ENCOUNTER — TELEPHONE (OUTPATIENT)
Dept: PAIN MEDICINE | Facility: CLINIC | Age: 73
End: 2023-12-13
Payer: MEDICARE

## 2023-12-13 VITALS
DIASTOLIC BLOOD PRESSURE: 73 MMHG | RESPIRATION RATE: 18 BRPM | HEART RATE: 89 BPM | BODY MASS INDEX: 24.7 KG/M2 | OXYGEN SATURATION: 100 % | HEIGHT: 68 IN | SYSTOLIC BLOOD PRESSURE: 160 MMHG | WEIGHT: 162.94 LBS

## 2023-12-13 DIAGNOSIS — M54.16 LUMBAR RADICULOPATHY: ICD-10-CM

## 2023-12-13 DIAGNOSIS — M51.36 DDD (DEGENERATIVE DISC DISEASE), LUMBAR: Primary | ICD-10-CM

## 2023-12-13 PROCEDURE — 1125F AMNT PAIN NOTED PAIN PRSNT: CPT | Mod: CPTII,S$GLB,, | Performed by: STUDENT IN AN ORGANIZED HEALTH CARE EDUCATION/TRAINING PROGRAM

## 2023-12-13 PROCEDURE — 1159F PR MEDICATION LIST DOCUMENTED IN MEDICAL RECORD: ICD-10-PCS | Mod: CPTII,S$GLB,, | Performed by: STUDENT IN AN ORGANIZED HEALTH CARE EDUCATION/TRAINING PROGRAM

## 2023-12-13 PROCEDURE — 99214 OFFICE O/P EST MOD 30 MIN: CPT | Mod: S$GLB,,, | Performed by: STUDENT IN AN ORGANIZED HEALTH CARE EDUCATION/TRAINING PROGRAM

## 2023-12-13 PROCEDURE — 1160F RVW MEDS BY RX/DR IN RCRD: CPT | Mod: CPTII,S$GLB,, | Performed by: STUDENT IN AN ORGANIZED HEALTH CARE EDUCATION/TRAINING PROGRAM

## 2023-12-13 PROCEDURE — 1101F PR PT FALLS ASSESS DOC 0-1 FALLS W/OUT INJ PAST YR: ICD-10-PCS | Mod: CPTII,S$GLB,, | Performed by: STUDENT IN AN ORGANIZED HEALTH CARE EDUCATION/TRAINING PROGRAM

## 2023-12-13 PROCEDURE — 99999 PR PBB SHADOW E&M-EST. PATIENT-LVL III: CPT | Mod: PBBFAC,,, | Performed by: STUDENT IN AN ORGANIZED HEALTH CARE EDUCATION/TRAINING PROGRAM

## 2023-12-13 PROCEDURE — 1125F PR PAIN SEVERITY QUANTIFIED, PAIN PRESENT: ICD-10-PCS | Mod: CPTII,S$GLB,, | Performed by: STUDENT IN AN ORGANIZED HEALTH CARE EDUCATION/TRAINING PROGRAM

## 2023-12-13 PROCEDURE — 3078F PR MOST RECENT DIASTOLIC BLOOD PRESSURE < 80 MM HG: ICD-10-PCS | Mod: CPTII,S$GLB,, | Performed by: STUDENT IN AN ORGANIZED HEALTH CARE EDUCATION/TRAINING PROGRAM

## 2023-12-13 PROCEDURE — 99999 PR PBB SHADOW E&M-EST. PATIENT-LVL III: ICD-10-PCS | Mod: PBBFAC,,, | Performed by: STUDENT IN AN ORGANIZED HEALTH CARE EDUCATION/TRAINING PROGRAM

## 2023-12-13 PROCEDURE — 4010F PR ACE/ARB THEARPY RXD/TAKEN: ICD-10-PCS | Mod: CPTII,S$GLB,, | Performed by: STUDENT IN AN ORGANIZED HEALTH CARE EDUCATION/TRAINING PROGRAM

## 2023-12-13 PROCEDURE — 4010F ACE/ARB THERAPY RXD/TAKEN: CPT | Mod: CPTII,S$GLB,, | Performed by: STUDENT IN AN ORGANIZED HEALTH CARE EDUCATION/TRAINING PROGRAM

## 2023-12-13 PROCEDURE — 3078F DIAST BP <80 MM HG: CPT | Mod: CPTII,S$GLB,, | Performed by: STUDENT IN AN ORGANIZED HEALTH CARE EDUCATION/TRAINING PROGRAM

## 2023-12-13 PROCEDURE — 99214 PR OFFICE/OUTPT VISIT, EST, LEVL IV, 30-39 MIN: ICD-10-PCS | Mod: S$GLB,,, | Performed by: STUDENT IN AN ORGANIZED HEALTH CARE EDUCATION/TRAINING PROGRAM

## 2023-12-13 PROCEDURE — 3077F SYST BP >= 140 MM HG: CPT | Mod: CPTII,S$GLB,, | Performed by: STUDENT IN AN ORGANIZED HEALTH CARE EDUCATION/TRAINING PROGRAM

## 2023-12-13 PROCEDURE — 3288F PR FALLS RISK ASSESSMENT DOCUMENTED: ICD-10-PCS | Mod: CPTII,S$GLB,, | Performed by: STUDENT IN AN ORGANIZED HEALTH CARE EDUCATION/TRAINING PROGRAM

## 2023-12-13 PROCEDURE — 1101F PT FALLS ASSESS-DOCD LE1/YR: CPT | Mod: CPTII,S$GLB,, | Performed by: STUDENT IN AN ORGANIZED HEALTH CARE EDUCATION/TRAINING PROGRAM

## 2023-12-13 PROCEDURE — 3288F FALL RISK ASSESSMENT DOCD: CPT | Mod: CPTII,S$GLB,, | Performed by: STUDENT IN AN ORGANIZED HEALTH CARE EDUCATION/TRAINING PROGRAM

## 2023-12-13 PROCEDURE — 3008F PR BODY MASS INDEX (BMI) DOCUMENTED: ICD-10-PCS | Mod: CPTII,S$GLB,, | Performed by: STUDENT IN AN ORGANIZED HEALTH CARE EDUCATION/TRAINING PROGRAM

## 2023-12-13 PROCEDURE — 1160F PR REVIEW ALL MEDS BY PRESCRIBER/CLIN PHARMACIST DOCUMENTED: ICD-10-PCS | Mod: CPTII,S$GLB,, | Performed by: STUDENT IN AN ORGANIZED HEALTH CARE EDUCATION/TRAINING PROGRAM

## 2023-12-13 PROCEDURE — 3077F PR MOST RECENT SYSTOLIC BLOOD PRESSURE >= 140 MM HG: ICD-10-PCS | Mod: CPTII,S$GLB,, | Performed by: STUDENT IN AN ORGANIZED HEALTH CARE EDUCATION/TRAINING PROGRAM

## 2023-12-13 PROCEDURE — 1159F MED LIST DOCD IN RCRD: CPT | Mod: CPTII,S$GLB,, | Performed by: STUDENT IN AN ORGANIZED HEALTH CARE EDUCATION/TRAINING PROGRAM

## 2023-12-13 PROCEDURE — 3008F BODY MASS INDEX DOCD: CPT | Mod: CPTII,S$GLB,, | Performed by: STUDENT IN AN ORGANIZED HEALTH CARE EDUCATION/TRAINING PROGRAM

## 2023-12-13 NOTE — H&P (VIEW-ONLY)
Chronic Pain - Established Clinic Visit    Referring Physician: No ref. provider found    Chief Complaint:   Chief Complaint   Patient presents with    Low-back Pain        SUBJECTIVE:    INTERVAL HISTORY 12/13/2023:  Mr. Solomon presents for lower back and bilateral leg pain. Current Pain level is 5/10.  He is s/p L5/S1 interlaminar epidural steroid injection with no significant improvement in his pain. He reports that he initially had minimal relief of his left leg, however the pain returned after the weekend. He reports that his pain is 10/10 in the morning and feels better with flexion and taking Ibuprofen.  Coughing/sneezing makes his pain worse.  He continues to try home exercises as tolerated with limited relief in his pain.  He brought in his MRI report and disc from his outside facility which again demonstrates multilevel disc degenerative changes with multilevel foraminal stenosis.     Interval History 11/16/2023:  73-year-old male presents today for a follow-up appointment to discuss his lumbar MRI and be considered for pain interventions.  His pain continues to complain of  low back with radiating symptoms into his legs posteriorly slightly pass his knees bilaterally.  He reports pulling and tightening symptoms in his legs bilaterally walking, standing and repositioning his body increases his pain.   He denies any profound weakness denies any recent incident or trauma denies any bowel bladder dysfunction, denies any saddle anesthesia at this time.    David Solomon presents to the clinic for the evaluation of lower back pain. The pain started 6 weeks ago following lawn work and symptoms have been worsening.The pain is located in the lower back area and radiates to the hamstrings and down to his calves.  The pain is described as stabbing and is rated as 7/10. The pain is rated with a score of  0/10 on the BEST day and a score of 9/10 on the WORST day.  Symptoms interfere with daily activity. The pain is  exacerbated by certain positions, walking, worse in the morning (getting out of bed).  The pain is mitigated by medications (ibuprofen and steroid (one dose).  The patient reports 6 hours of uninterrupted sleep per night.    He has tried biofreeze without relief.  He was given a medrol dose pack, but his blood glucose went extremely high, so he stopped it. He also had a steroid injection at an ED which also severely increased his blood sugar.  He sees his endocrinologist for over 20 years and is currently on a long acting and short acting regimen.  He checks his sugars 10 times a day.    He was seen by Dr. Gonsalves on 5/25/2020 for knee pain and was given some knee rehab exercises.  He continues to participate in [employer].    Patient denies urinary incontinence, bowel incontinence, and significant motor weakness.    Physical Therapy/Home Exercise: no      Pain Disability Index Review:      11/16/2023     8:33 AM 10/4/2023     2:06 PM 5/25/2020     8:18 AM   Last 3 PDI Scores   Pain Disability Index (PDI) 52 44 35       Pain Medications:  Norco 7.5-325mg  Ibuprofen 800mg twice a day     report:  Reviewed and consistent with medication use as prescribed.    Pain Procedures:   None    Imaging:   Outside Lumbar MRI: multilevel degenerative disease with central and foraminal stenosis at L4/5 and L5/S1    Past Medical History:   Diagnosis Date    Cataract     Diabetes mellitus     Diabetic retinopathy     Hypertension      Past Surgical History:   Procedure Laterality Date    CATARACT EXTRACTION Right 03/27/2022    Dr. Ricketts    EPIDURAL STEROID INJECTION INTO LUMBAR SPINE N/A 12/1/2023    Procedure: L5-S1 LESI;  Surgeon: Tomeka Lowry MD;  Location: [facility];  Service: Pain Management;  Laterality: N/A;  oral 20 mins    INTRAOCULAR PROSTHESES INSERTION Right 3/27/2022    Procedure: INSERTION, IOL PROSTHESIS;  Surgeon: Divine Ricketts MD;  Location: [facility] OR [address] [place] [address];  Service: Ophthalmology;   Laterality: Right;    INTRAOCULAR PROSTHESES INSERTION Left 5/15/2022    Procedure: INSERTION, IOL PROSTHESIS;  Surgeon: Divine Ricketts MD;  Location: [address];  Service: Ophthalmology;  Laterality: Left;    PHACOEMULSIFICATION OF CATARACT Right 3/27/2022    Procedure: PHACOEMULSIFICATION, CATARACT;  Surgeon: Divine Ricketts MD;  Location: [address];  Service: Ophthalmology;  Laterality: Right;    PHACOEMULSIFICATION OF CATARACT Left 5/15/2022    Procedure: PHACOEMULSIFICATION, CATARACT;  Surgeon: Divine Ricketts MD;  Location: [address];  Service: Ophthalmology;  Laterality: Left;     Social History     Socioeconomic History    Marital status:    Tobacco Use    Smoking status: Never    Smokeless tobacco: Never   Substance and Sexual Activity    Alcohol use: No    Drug use: No    Sexual activity: Not Currently     Family History   Problem Relation Age of Onset    No Known Problems Mother     No Known Problems Father     Melanoma Neg Hx        Review of patient's allergies indicates:  No Known Allergies    Current Outpatient Medications   Medication Sig    atorvastatin (LIPITOR) 40 MG tablet Take 40 mg by mouth every evening.    diltiaZEM (CARDIZEM LA) 180 mg 24 hr tablet Take 180 mg by mouth once daily.    HUMALOG KWIKPEN INSULIN 100 unit/mL pen Inject into the skin.    hydroCHLOROthiazide (HYDRODIURIL) 12.5 MG Tab Take 12.5 mg by mouth once daily.    insulin glargine (LANTUS) 100 unit/mL injection Inject 22 Units into the skin once daily.    irbesartan (AVAPRO) 75 MG tablet Take 75 mg by mouth every evening.    meloxicam (MOBIC) 15 MG tablet Take 15 mg by mouth.     No current facility-administered medications for this visit.     Facility-Administered Medications Ordered in Other Visits   Medication    phenylephrine HCL 2.5% ophthalmic solution 1 drop    phenylephrine HCL 2.5% ophthalmic solution 1 drop    proparacaine 0.5 % ophthalmic solution 1 drop    proparacaine 0.5 %  "ophthalmic solution 1 drop    tropicamide 1% ophthalmic solution 1 drop    tropicamide 1% ophthalmic solution 1 drop       REVIEW OF SYSTEMS:    GENERAL:  No weight loss, malaise or fevers.  HEENT:  Negative for frequent or significant headaches.  NECK:  Negative for lumps, goiter, pain and significant neck swelling.  RESPIRATORY:  Negative for cough, wheezing or shortness of breath.  CARDIOVASCULAR:  Negative for chest pain, leg swelling or palpitations.  GI:  Negative for abdominal discomfort, blood in stools or black stools or change in bowel habits.  MUSCULOSKELETAL:  See HPI.  SKIN:  Negative for lesions, rash, and itching.  PSYCH:  Negative for sleep disturbance, mood disorder and recent psychosocial stressors.  HEMATOLOGY/LYMPHOLOGY:  Negative for prolonged bleeding, bruising easily or swollen nodes. +DM type 1  NEURO:   No history of headaches, syncope, paralysis, seizures or tremors.  All other reviewed and negative other than HPI.    OBJECTIVE:    BP (!) 160/73 (BP Location: Right arm, Patient Position: Sitting, BP Method: Medium (Automatic))   Pulse 89   Resp 18   Ht 5' 8" (1.727 m)   Wt 73.9 kg (162 lb 14.7 oz)   SpO2 100%   BMI 24.77 kg/m²     PHYSICAL EXAMINATION:  General appearance: Well appearing, in no acute distress, alert and appropriately communicative.  Psych:  Mood and affect appropriate.  Skin: Skin color, texture, turgor normal, no rashes or lesions, in both upper and lower body.  Head/face:  Atraumatic, normocephalic.  Cor: regular rate  Pulm: non-labored breathing  GI: Abdomen non-distended and non-tender.  Back: Straight leg raising in the sitting and supine positions is positive to radicular pain bilaterally. No pain to palpation over the spine or paraspinal muscles. Normal range of motion without pain reproduction.  Extremities: Peripheral joint ROM is full and pain free without obvious instability or laxity in all four extremities. No deformities, edema, or skin discoloration. " Good capillary refill.  Musculoskeletal:  hip, sacroiliac and knee provocative maneuvers are negative. Bilateral upper and lower extremity strength is normal and symmetric.  No atrophy or tone abnormalities are noted.  Neuro: Bilateral upper and lower extremity coordination and muscle stretch reflexes are physiologic and symmetric.  Negative Clonus. No loss of sensation is noted.  Gait: Normal.    Lab Results   Component Value Date    CREATININE 1.16 03/17/2017       ASSESSMENT: 73 y.o. year old male with lower back pain, consistent with:     1. DDD (degenerative disc disease), lumbar  Procedure Order to Pain Management      2. Lumbar radiculopathy  Procedure Order to Pain Management          IMPRESSION: Mr. Solomon returns with lower back and bilateral lower extremity pain.  He is s/p L5/S1 interlaminar epidural injection with no improvement in his pain. History and physical exam are consistent with lumbar radiculopathy, specifically following the L5 distribution bilaterally. Imaging is consistent with lumbar degenerative disc disease with central and foraminal stenosis at L4/5 and L5/S1.  He has failed the interlaminar epidural, however he may have benefit from a bilateral transforaminal L5/S1 epidural based on his symptoms and physical exam.    PLAN:   - I have stressed the importance of physical activity and a home exercise plan to help with pain and improve health.  -  Schedule for bilateral L5/S1 TFESI with any available provider  - Patient can continue with medications for now since they are providing benefits, using them appropriately, and without side effects.  -  Continue physical therapy for lower back and leg pain  - counseled on using tylenol (< 3000mg total) more than ibuprofen  - RTC  2-3 weeks after injection; if his pain does not improve, he may warrant referral to spine surgery  - Counseled patient regarding the importance of activity modification and physical therapy.    The above plan and  management options were discussed at length with patient. Patient is in agreement with the above and verbalized understanding. It will be communicated with the referring physician via electronic record, fax, or mail.    Tomeka Lowry MD  Interventional Pain Management    12/13/2023

## 2023-12-13 NOTE — PROGRESS NOTES
Chronic Pain - Established Clinic Visit    Referring Physician: No ref. provider found    Chief Complaint:   Chief Complaint   Patient presents with    Low-back Pain        SUBJECTIVE:    INTERVAL HISTORY 12/13/2023:  Mr. Solomon presents for lower back and bilateral leg pain. Current Pain level is 5/10.  He is s/p L5/S1 interlaminar epidural steroid injection with no significant improvement in his pain. He reports that he initially had minimal relief of his left leg, however the pain returned after the weekend. He reports that his pain is 10/10 in the morning and feels better with flexion and taking Ibuprofen.  Coughing/sneezing makes his pain worse.  He continues to try home exercises as tolerated with limited relief in his pain.  He brought in his MRI report and disc from his outside facility which again demonstrates multilevel disc degenerative changes with multilevel foraminal stenosis.     Interval History 11/16/2023:  73-year-old male presents today for a follow-up appointment to discuss his lumbar MRI and be considered for pain interventions.  His pain continues to complain of  low back with radiating symptoms into his legs posteriorly slightly pass his knees bilaterally.  He reports pulling and tightening symptoms in his legs bilaterally walking, standing and repositioning his body increases his pain.   He denies any profound weakness denies any recent incident or trauma denies any bowel bladder dysfunction, denies any saddle anesthesia at this time.    David Solomon presents to the clinic for the evaluation of lower back pain. The pain started 6 weeks ago following lawn work and symptoms have been worsening.The pain is located in the lower back area and radiates to the hamstrings and down to his calves.  The pain is described as stabbing and is rated as 7/10. The pain is rated with a score of  0/10 on the BEST day and a score of 9/10 on the WORST day.  Symptoms interfere with daily activity. The pain is  exacerbated by certain positions, walking, worse in the morning (getting out of bed).  The pain is mitigated by medications (ibuprofen and steroid (one dose).  The patient reports 6 hours of uninterrupted sleep per night.    He has tried biofreeze without relief.  He was given a medrol dose pack, but his blood glucose went extremely high, so he stopped it. He also had a steroid injection at an ED which also severely increased his blood sugar.  He sees his endocrinologist for over 20 years and is currently on a long acting and short acting regimen.  He checks his sugars 10 times a day.    He was seen by Dr. Gonsalves on 5/25/2020 for knee pain and was given some knee rehab exercises.  He continues to participate in [employer] fitness.    Patient denies urinary incontinence, bowel incontinence, and significant motor weakness.    Physical Therapy/Home Exercise: no      Pain Disability Index Review:      11/16/2023     8:33 AM 10/4/2023     2:06 PM 5/25/2020     8:18 AM   Last 3 PDI Scores   Pain Disability Index (PDI) 52 44 35       Pain Medications:  Norco 7.5-325mg  Ibuprofen 800mg twice a day     report:  Reviewed and consistent with medication use as prescribed.    Pain Procedures:   None    Imaging:   Outside Lumbar MRI: multilevel degenerative disease with central and foraminal stenosis at L4/5 and L5/S1    Past Medical History:   Diagnosis Date    Cataract     Diabetes mellitus     Diabetic retinopathy     Hypertension      Past Surgical History:   Procedure Laterality Date    CATARACT EXTRACTION Right 03/27/2022    Dr. Ricketts    EPIDURAL STEROID INJECTION INTO LUMBAR SPINE N/A 12/1/2023    Procedure: L5-S1 LESI;  Surgeon: Tomeka Lowry MD;  Location: [facility];  Service: Pain Management;  Laterality: N/A;  oral 20 mins    INTRAOCULAR PROSTHESES INSERTION Right 3/27/2022    Procedure: INSERTION, IOL PROSTHESIS;  Surgeon: Divine Ricketts MD;  Location: [facility] OR [address] [place] [address];  Service: Ophthalmology;   Laterality: Right;    INTRAOCULAR PROSTHESES INSERTION Left 5/15/2022    Procedure: INSERTION, IOL PROSTHESIS;  Surgeon: Divine Ricketts MD;  Location: [address];  Service: Ophthalmology;  Laterality: Left;    PHACOEMULSIFICATION OF CATARACT Right 3/27/2022    Procedure: PHACOEMULSIFICATION, CATARACT;  Surgeon: Divine Ricketts MD;  Location: [address];  Service: Ophthalmology;  Laterality: Right;    PHACOEMULSIFICATION OF CATARACT Left 5/15/2022    Procedure: PHACOEMULSIFICATION, CATARACT;  Surgeon: Divine Ricketts MD;  Location: [address];  Service: Ophthalmology;  Laterality: Left;     Social History     Socioeconomic History    Marital status:    Tobacco Use    Smoking status: Never    Smokeless tobacco: Never   Substance and Sexual Activity    Alcohol use: No    Drug use: No    Sexual activity: Not Currently     Family History   Problem Relation Age of Onset    No Known Problems Mother     No Known Problems Father     Melanoma Neg Hx        Review of patient's allergies indicates:  No Known Allergies    Current Outpatient Medications   Medication Sig    atorvastatin (LIPITOR) 40 MG tablet Take 40 mg by mouth every evening.    diltiaZEM (CARDIZEM LA) 180 mg 24 hr tablet Take 180 mg by mouth once daily.    HUMALOG KWIKPEN INSULIN 100 unit/mL pen Inject into the skin.    hydroCHLOROthiazide (HYDRODIURIL) 12.5 MG Tab Take 12.5 mg by mouth once daily.    insulin glargine (LANTUS) 100 unit/mL injection Inject 22 Units into the skin once daily.    irbesartan (AVAPRO) 75 MG tablet Take 75 mg by mouth every evening.    meloxicam (MOBIC) 15 MG tablet Take 15 mg by mouth.     No current facility-administered medications for this visit.     Facility-Administered Medications Ordered in Other Visits   Medication    phenylephrine HCL 2.5% ophthalmic solution 1 drop    phenylephrine HCL 2.5% ophthalmic solution 1 drop    proparacaine 0.5 % ophthalmic solution 1 drop    proparacaine 0.5 %  "ophthalmic solution 1 drop    tropicamide 1% ophthalmic solution 1 drop    tropicamide 1% ophthalmic solution 1 drop       REVIEW OF SYSTEMS:    GENERAL:  No weight loss, malaise or fevers.  HEENT:  Negative for frequent or significant headaches.  NECK:  Negative for lumps, goiter, pain and significant neck swelling.  RESPIRATORY:  Negative for cough, wheezing or shortness of breath.  CARDIOVASCULAR:  Negative for chest pain, leg swelling or palpitations.  GI:  Negative for abdominal discomfort, blood in stools or black stools or change in bowel habits.  MUSCULOSKELETAL:  See HPI.  SKIN:  Negative for lesions, rash, and itching.  PSYCH:  Negative for sleep disturbance, mood disorder and recent psychosocial stressors.  HEMATOLOGY/LYMPHOLOGY:  Negative for prolonged bleeding, bruising easily or swollen nodes. +DM type 1  NEURO:   No history of headaches, syncope, paralysis, seizures or tremors.  All other reviewed and negative other than HPI.    OBJECTIVE:    BP (!) 160/73 (BP Location: Right arm, Patient Position: Sitting, BP Method: Medium (Automatic))   Pulse 89   Resp 18   Ht 5' 8" (1.727 m)   Wt 73.9 kg (162 lb 14.7 oz)   SpO2 100%   BMI 24.77 kg/m²     PHYSICAL EXAMINATION:  General appearance: Well appearing, in no acute distress, alert and appropriately communicative.  Psych:  Mood and affect appropriate.  Skin: Skin color, texture, turgor normal, no rashes or lesions, in both upper and lower body.  Head/face:  Atraumatic, normocephalic.  Cor: regular rate  Pulm: non-labored breathing  GI: Abdomen non-distended and non-tender.  Back: Straight leg raising in the sitting and supine positions is positive to radicular pain bilaterally. No pain to palpation over the spine or paraspinal muscles. Normal range of motion without pain reproduction.  Extremities: Peripheral joint ROM is full and pain free without obvious instability or laxity in all four extremities. No deformities, edema, or skin discoloration. " Good capillary refill.  Musculoskeletal:  hip, sacroiliac and knee provocative maneuvers are negative. Bilateral upper and lower extremity strength is normal and symmetric.  No atrophy or tone abnormalities are noted.  Neuro: Bilateral upper and lower extremity coordination and muscle stretch reflexes are physiologic and symmetric.  Negative Clonus. No loss of sensation is noted.  Gait: Normal.    Lab Results   Component Value Date    CREATININE 1.16 03/17/2017       ASSESSMENT: 73 y.o. year old male with lower back pain, consistent with:     1. DDD (degenerative disc disease), lumbar  Procedure Order to Pain Management      2. Lumbar radiculopathy  Procedure Order to Pain Management          IMPRESSION: Mr. Solomon returns with lower back and bilateral lower extremity pain.  He is s/p L5/S1 interlaminar epidural injection with no improvement in his pain. History and physical exam are consistent with lumbar radiculopathy, specifically following the L5 distribution bilaterally. Imaging is consistent with lumbar degenerative disc disease with central and foraminal stenosis at L4/5 and L5/S1.  He has failed the interlaminar epidural, however he may have benefit from a bilateral transforaminal L5/S1 epidural based on his symptoms and physical exam.    PLAN:   - I have stressed the importance of physical activity and a home exercise plan to help with pain and improve health.  -  Schedule for bilateral L5/S1 TFESI with any available provider  - Patient can continue with medications for now since they are providing benefits, using them appropriately, and without side effects.  -  Continue physical therapy for lower back and leg pain  - counseled on using tylenol (< 3000mg total) more than ibuprofen  - RTC  2-3 weeks after injection; if his pain does not improve, he may warrant referral to spine surgery  - Counseled patient regarding the importance of activity modification and physical therapy.    The above plan and  management options were discussed at length with patient. Patient is in agreement with the above and verbalized understanding. It will be communicated with the referring physician via electronic record, fax, or mail.    Tomeka Lowry MD  Interventional Pain Management    12/13/2023

## 2023-12-14 ENCOUNTER — TELEPHONE (OUTPATIENT)
Dept: PAIN MEDICINE | Facility: CLINIC | Age: 73
End: 2023-12-14
Payer: MEDICARE

## 2023-12-14 DIAGNOSIS — M54.16 LUMBAR RADICULOPATHY: Primary | ICD-10-CM

## 2023-12-14 NOTE — TELEPHONE ENCOUNTER
----- Message from Kanika Mirza MD sent at 2023  3:33 PM CST -----  Regarding: Order for SCOTTIE WILSON    Patient Name: SCOTTIE WILSON([id number])  Sex: Male  : 1950      PCP: DOMINIC, PRIMARY DOCTOR    Center: [facility] ([facility])     Types of orders made on 2023: Procedure Request    Order Date:2023  Ordering User:KANIKA MIRZA [[id number]]  Encounter Provider:Kanika Mirza MD [[id number]]  Authorizing Provider:   Kanika Abreu MD [[id number]]  Department:[facility] BACK AND SPINE[[id number]]    Common Order Information  Procedure -> Transforaminal Injection (Specify level and laterality) Cmt:             Bilateral L5/S1 TFESI    Order Specific Information  Order: Procedure Order to Pain Management [Custom: [id number]]  Order #:          [id number]: 1 FUTURE    Priority: Routine  Class: Clinic Performed    Future Order Informat  ion      Expires on:2024            Expected by:2023                   Associated Diagnoses      M51.36 DDD (degenerative disc disease), lumbar      M54.16 Lumbar radiculopathy      Physician -> ANY         Is patient on anti-coagulants? -> No         Facility Name: -> [facility]         Follow-up: -> 2 weeks           Priority: Routine  Class: Clinic Performed    Future Order Information      Expi  res on:2024            Expected by:2023                   Associated Diagnoses      M51.36 DDD (degenerative disc disease), lumbar      M54.16 Lumbar radiculopathy      Procedure -> Transforaminal Injection (Specify level and laterality) Cmt:                 Bilateral L5/S1 TFESI        Physician -> ANY         Is patient on anti-coagulants? -> No         Facility Name: -> [facility]         Follow-up: -> 2 weeks

## 2023-12-26 ENCOUNTER — TELEPHONE (OUTPATIENT)
Dept: PAIN MEDICINE | Facility: CLINIC | Age: 73
End: 2023-12-26
Payer: MEDICARE

## 2023-12-26 NOTE — TELEPHONE ENCOUNTER
I spoke with the patient regarding his procedure with Dr. Fuentes scheduled on 01/02/2024. The patient denied being sick or on antibiotics and was provided with the scheduled time 14:35 am. The patient verbalized understanding. Thank you.

## 2023-12-29 NOTE — PRE-PROCEDURE INSTRUCTIONS
The following was discussed with pt via phone and sent to pt portal. Pt verbalized understanding. Insulin instructions reviewed, hold humalog the morning of procedure and take half of usual Lantus dose the morning of procedure. Understanding verbalized with no complaints voiced.      Dear David ,    You are scheduled for a procedure with Dr. Tyree Ochoa on 1/2/2024. Your scheduled arrival time is 8:55 am.  This arrival time is roughly 1 hour before your anticipated procedure time to allow sufficient time for pre-op..  Please wear comfortable clothes.  Most patients do not need to change into a gown.  Please do not wear a dress.  This procedure will take place at the [facility] at the corner of [facility] and [facility].  [facility] [place] [facility] to [facility].  The address is:    [address] [place] [address].  [place] [id number]    After entering the building, you will proceed to the second floor where you can check in with registration. You should take any medications that you routinely take for blood pressure, heart medications, thyroid, cholesterol, etc.     Please hold the following medications the morning of your procedure:  -Hydrochlorothiazide  -Irbesartan  -Mobic  -Humalog  -Lantus-Take half of your usual dose the morning of your procedure    The fasting restrictions are dependent on whether or not you are receiving sedation.  Sedation is not available for all procedures.     Your fasting instructions are as follow:  IV sedation. You should not eat for 8 hours and can only drink clear liquids (water or black coffee without cream/sugar) up until 2 hours before your scheduled time.  You CANNOT drive yourself and must have a .    If you are on blood thinners, you need to follow the anticoagulation instructions that had been discussed previously.  You should only stop the blood thinners if it was approved by your primary care physician or your cardiologist.   In the event that you are not able to stop your blood thinners, a blood thinner was not listed on your medication list, or we were not able to get clearance from your cardiologist, then the procedure may have to be postponed/canceled.     IF you were told to stop your blood thinners, this is how long you should generally hold some of the more common ones.  Remember that stopping blood thinners is only necessary for certain procedures. If you are unsure of your instructions, please call us.   Aspirin - 5 days  Plavix/Clopidogrel - 7 days  Warfarin / Coumadin - 5 days  Eliquis - 3 days  Pradaxa/Dabigatran - 4 days  Xarelto/Rivaroxaban - 3 days    If you are a diabetic, do not take your medication if you will be fasting, but bring it with you. Please plan on being here for roughly 2 hours.     Please call us if you have been sick (running fever, having any flu-like symptoms) or have been taking antibiotics in the past 2 weeks or had any outpatient procedures other than with us (colonoscopy, endoscopy, OBGYN, dental, etc.). If you have been previously COVID positive, you will need to hold off on your procedure until you are symptom free for 10 days. If you did not have any symptoms, you can have your procedure 10 days from your positive test result.      *HOLD ALL VITAMINS, MINERALS, HERBS (INCLUDING HERBAL TEAS) AND SUPPLEMENTS  *SHOWER WITH ANTIBACTERIAL SOAP (EX. DIAL) NIGHT BEFORE AND MORNING OF PROCEDURE  *DO NOT APPLY ANY LOTIONS, OILS, POWDERS, PERFUME/COLOGNE, OINTMENTS, GELS, CREAMS, MAKEUP OR DEODORANT TO YOUR SKIN MORNING OF PROCEDURE  *LEAVE JEWELRY AND ANY VALUABLES AT HOME  *WEAR LOOSE COMFORTABLE CLOTHING (PREFERABLY A BUTTON UP SHIRT)      Thank you,  [facility] Rachelle, RN  Pre-Admit

## 2024-01-02 ENCOUNTER — HOSPITAL ENCOUNTER (OUTPATIENT)
Facility: HOSPITAL | Age: 74
Discharge: HOME | End: 2024-01-02
Attending: STUDENT IN AN ORGANIZED HEALTH CARE EDUCATION/TRAINING PROGRAM | Admitting: STUDENT IN AN ORGANIZED HEALTH CARE EDUCATION/TRAINING PROGRAM
Payer: MEDICARE

## 2024-01-02 VITALS
SYSTOLIC BLOOD PRESSURE: 177 MMHG | DIASTOLIC BLOOD PRESSURE: 86 MMHG | HEART RATE: 73 BPM | OXYGEN SATURATION: 97 % | RESPIRATION RATE: 16 BRPM | TEMPERATURE: 98 F

## 2024-01-02 DIAGNOSIS — M54.16 LUMBAR RADICULOPATHY: ICD-10-CM

## 2024-01-02 LAB — POCT GLUCOSE: 139 MG/DL (ref 70–110)

## 2024-01-02 PROCEDURE — 25500020 PHARM REV CODE 255: Performed by: STUDENT IN AN ORGANIZED HEALTH CARE EDUCATION/TRAINING PROGRAM

## 2024-01-02 PROCEDURE — 63600175 PHARM REV CODE 636 W HCPCS: Performed by: STUDENT IN AN ORGANIZED HEALTH CARE EDUCATION/TRAINING PROGRAM

## 2024-01-02 PROCEDURE — 25000003 PHARM REV CODE 250: Performed by: STUDENT IN AN ORGANIZED HEALTH CARE EDUCATION/TRAINING PROGRAM

## 2024-01-02 PROCEDURE — 64483 NJX AA&/STRD TFRM EPI L/S 1: CPT | Mod: 50 | Performed by: STUDENT IN AN ORGANIZED HEALTH CARE EDUCATION/TRAINING PROGRAM

## 2024-01-02 PROCEDURE — 64483 NJX AA&/STRD TFRM EPI L/S 1: CPT | Mod: 50,,, | Performed by: STUDENT IN AN ORGANIZED HEALTH CARE EDUCATION/TRAINING PROGRAM

## 2024-01-02 PROCEDURE — 82962 GLUCOSE BLOOD TEST: CPT | Performed by: STUDENT IN AN ORGANIZED HEALTH CARE EDUCATION/TRAINING PROGRAM

## 2024-01-02 RX ORDER — FENTANYL CITRATE 50 UG/ML
25 INJECTION, SOLUTION INTRAMUSCULAR; INTRAVENOUS ONCE AS NEEDED
Status: COMPLETED | OUTPATIENT
Start: 2024-01-02 | End: 2024-01-02

## 2024-01-02 RX ORDER — DEXAMETHASONE SODIUM PHOSPHATE 10 MG/ML
INJECTION INTRAMUSCULAR; INTRAVENOUS
Status: DISCONTINUED | OUTPATIENT
Start: 2024-01-02 | End: 2024-01-02 | Stop reason: HOSPADM

## 2024-01-02 RX ORDER — LIDOCAINE HYDROCHLORIDE 20 MG/ML
INJECTION, SOLUTION EPIDURAL; INFILTRATION; INTRACAUDAL; PERINEURAL
Status: DISCONTINUED | OUTPATIENT
Start: 2024-01-02 | End: 2024-01-02 | Stop reason: HOSPADM

## 2024-01-02 RX ORDER — MIDAZOLAM HYDROCHLORIDE 1 MG/ML
2 INJECTION INTRAMUSCULAR; INTRAVENOUS ONCE AS NEEDED
Status: COMPLETED | OUTPATIENT
Start: 2024-01-02 | End: 2024-01-02

## 2024-01-02 RX ORDER — SODIUM CHLORIDE 9 MG/ML
500 INJECTION, SOLUTION INTRAVENOUS CONTINUOUS
Status: DISCONTINUED | OUTPATIENT
Start: 2024-01-02 | End: 2024-01-02 | Stop reason: HOSPADM

## 2024-01-02 RX ORDER — LIDOCAINE HYDROCHLORIDE 10 MG/ML
INJECTION, SOLUTION EPIDURAL; INFILTRATION; INTRACAUDAL; PERINEURAL
Status: DISCONTINUED | OUTPATIENT
Start: 2024-01-02 | End: 2024-01-02 | Stop reason: HOSPADM

## 2024-01-02 NOTE — PLAN OF CARE
Pt denies pain or discomfort @ this time. Pt states he is ready to be discharged home @ this time. PIV dc'd with tip intact. Dressing placed. Discharge instructions reviewed with patient, with time allotted for questions. Pt verbalizes understanding of instructions and states they have no further questions @ this time. Procedure site is clean, dry and intact. Band-aids in place. Vital signs are stable. No acute distress noted. Staff is at bedside to assist patient. Wheeled to discharge area. Home with family in private vehicle.

## 2024-01-02 NOTE — DISCHARGE SUMMARY
Discharge Note  Short Stay      SUMMARY     Admit Date: 1/2/2024    Attending Physician: Tyree Ochoa DO      Discharge Physician: Noel Brewer MD      Discharge Date: 1/2/2024 10:35 AM    Procedure(s) (LRB):  B/L L5-S1 TFESI (Bilateral)    Final Diagnosis: Lumbar radiculopathy [M54.16]    Disposition: Home or self care    Patient Instructions:   Current Discharge Medication List        CONTINUE these medications which have NOT CHANGED    Details   atorvastatin (LIPITOR) 40 MG tablet Take 40 mg by mouth every evening.      diltiaZEM (CARDIZEM LA) 180 mg 24 hr tablet Take 180 mg by mouth once daily.      HUMALOG KWIKPEN INSULIN 100 unit/mL pen Inject into the skin.      hydroCHLOROthiazide (HYDRODIURIL) 12.5 MG Tab Take 12.5 mg by mouth once daily.      insulin glargine (LANTUS) 100 unit/mL injection Inject 22 Units into the skin once daily.      irbesartan (AVAPRO) 75 MG tablet Take 75 mg by mouth every evening.      meloxicam (MOBIC) 15 MG tablet Take 15 mg by mouth.                 Discharge Diagnosis: Lumbar radiculopathy [M54.16]  Condition on Discharge: Stable with no complications to procedure   Diet on Discharge: Same as before.  Activity: as per instruction sheet.  Discharge to: Home with a responsible adult.  Follow up: 2-4 weeks       Please call my office or pager at [phone] if experienced any weakness or loss of sensation, fever > 101.5, pain uncontrolled with oral medications, persistent nausea/vomiting/or diarrhea, redness or drainage from the incisions, or any other worrisome concerns. If physician on call was not reached or could not communicate with our office for any reason please go to the nearest emergency department

## 2024-01-02 NOTE — PLAN OF CARE
Chart reviewed. Safe surgery checklist completed. VSS on RA. Pt denies use of injectable weight loss medications. Pt also denies any recent vaccinations, steroid shots, or antibiotic use. Bed in low position with call bell within reach. Belongings placed under stretcher.

## 2024-01-02 NOTE — DISCHARGE INSTRUCTIONS
[facility]  [facility]  "[employer] service # [phone]    POST-PROCEDURE INSTRUCTIONS:    Today you had an injection that included a steroid medications.  The steroid may or may not have been mixed with a local anesthetic when it was injected.   If the injection was in the neck, you may feel some pressure, numbness, or slight weakness in the arm after the procedure for a short period of time (this is a normal response), if this persists for longer than 1 day please contact our office or go to the emergency room.  If the injection was in the low back, you may feel some pressure, numbness, or slight weakness in the leg after the procedure for a short period of time (this is a normal response), if this persists for longer than 1 day please contact our office or go to the emergency room.  You may get side effects from the steroid.  This is not uncommon.  Symptoms include: elevated blood sugar, elevated blood pressure, headache, flushing, nausea, insomnia.  These symptoms are transient and will resolve within 1-3 days.  If symptoms last longer than this please contact our office or head to the emergency room.  Steroid medications can take anywhere from 3-14 days to take effect (rarely longer).  You may notice that your pain worsens for a short period of time after the injection, this would not be unusual due to the pressure and trauma from the needle.    If you do not have a follow up appointment scheduled, please contact my office (or the office of the physician who referred you for the procedure) to get a post-procedure follow up scheduled 2-4 weeks after the procedure.  This can be done as a virtual visit if that is more convenient for you.      What you need to do:    Keep a record of your response to the injection you had today.    How much relief did you get?   When did the relief start and how long did it last?  Were you able to decrease the use of any of your pain  medications?  Were you able to increase your level of activity?  How long did the relief last?    What to watch out for:    If you experience any of the following symptoms after your procedure, please notify the messaging service immediately (see above for contact information):   fever (increased oral temperature)   bleeding or swelling at the injection site,    drainage, rash or redness at the injection site    possible signs of infection    increased pain at the injection site   worsening of your usual pain   severe headache   new or worsening numbness    new arm and/or leg weakness, or    changes in bowel and/or bladder function: urinating or defecating on yourself and not knowing that you did it.    PLEASE FOLLOW ALL INSTRUCTIONS CAREFULLY     Do not engage in strenuous activity (e.g., lifting or pushing heavy objects or repeated bending) for 24 hours.     Do not take a bath, swim or use Jacuzzi for 24 hours after procedure. (A shower is fine).   Remove any Band-Aids when you get home.    Use cold/ice, as needed for comfort.  We recommend the use of cold therapy alternating on for 20 minutes, off for 20 minutes.    Do not apply direct heat (heating pad or heat packs) to the injection site for 24 hours.     Resume your usual medications, unless instructed otherwise by your Pain Physician.     If you are on warfarin (Coumadin) or other blood thinner, resume this medication as instructed by your prescribing Physician.    IF AT ANY POINT YOU ARE VERY CONCERNED ABOUT YOUR SYMPTOMS, PLEASE GO TO THE EMERGENCY ROOM.    If you develop worsening pain, weakness, numbness, lose bowel or bladder control (i.e., having an accident where you did not even know you had to go to the bathroom and suddenly noticed you soiled yourself), saddle anesthesia (a loss of sensation restricted to the area of the buttocks, anus and between the legs -- i.e., those parts of your body that would touch a saddle if you were sitting on one) you  need to go immediately to the emergency department for evaluation and treatment.    ----------------------------------------------------------------------------------------------------------------------------------------------------------------  If you received Sedation please read the following instructions:  POST SEDATION INSTRUCTIONS    Today you received intravenous medication (also known as sedation) that was used to help you relax and/or decrease discomfort during your procedure. This medication will be acting in your body for the next 24 hours, so you might feel a little tired or sleepy. This feeling will slowly wear off.   Common side effects associated with these medications include: drowsiness, dizziness, sleepiness, confusion, feeling excited, difficulty remembering things, lack of steadiness with walking or balance, loss of fine muscle control, slowed reflexes, difficulty focusing, and blurred vision.  Some over-the-counter and prescription medications (e.g., muscle relaxants, opioids, mood-altering medications, sedatives/hypnotics, antihistamines) can interact with the intravenous medication you received and cause an increased risk of the side effects listed above in addition to other potentially life threatening side effects. Use extreme caution if you are taking such medications, and consult with your Pain Physician or prescribing physician if you have any questions.  For the next 12-24 hours:    DO NOT--Drive a car, operate machinery or power tools   DO NOT--Drink any alcoholic beverages (not even beer), they may dangerously increase the risk of side effects.    DO NOT--Make any important legal or business decisions or sign important documents.  We advise you to have someone to assist you at home. Move slowly and carefully. Do not make sudden changes in position. Be aware of dizziness or light-headedness and move accordingly.   If you seek medical treatment within 24 hours, let the nurse or doctor  caring for you know that you have received the above medications. If you have any questions or concerns related to your sedation or treatment today please contact us.

## 2024-01-02 NOTE — OP NOTE
"PROCEDURE: bilateral Lumbar L5-S1 Transforaminal Epidural Steroid Injection    Patient Name: David Solomon  MRN: [id number]    PROCEDURE DATE: 1/2/2024    INJECTION # 2    DIAGNOSIS: Lumbar Radiculopathy  CPT CODE: 47682 (INJECTION(S), ANESTHETIC AGENT(S) AND/OR STEROID; TRANSFORAMINAL EPIDURAL, WITH IMAGING GUIDANCE (FLUOROSCOPY OR CT), LUMBAR OR SACRAL, SINGLE LEVEL), 09003 (Each additional level).     POSTPROCEDURE DIAGNOSIS: Same    PHYSICIAN: Tyree Ochoa DO  NEEDLE TYPE: - 22G 5" Spinal Needle  MEDICATIONS INJECTED: 6ml mixture of 1ml Dexamethasone 10mg/ml and 5ml 1% lidocaine PF split equally between each site.  CONTRAST: Omni 300    Sedation Medications - Mild Sedation with 2mg Versed and 25mcg Fentanyl    Estimated Blood Loss - <2ml  Drains: None  Specimens Removed: None  Urine Output - Not Measured  Complications: None  Outcome: Good    Informed Consent:  The patient's condition and proposed procedures, risks, and alternatives were discussed with the patient or responsible party.  The patient's / responsible party's questions were answered.   The patient / responsible party appeared to understand and chose to proceed.  Informed consent was obtained.  After obtaining written consent, an IV hep lock was placed. (See nurses notes for details).     Procedure in Detail:  The patient was taken back to the OR suite and placed in a prone position. The skin overlying the injection site was prepped and draped in an aseptic fashion. The target injection site (see above) was identified with fluoroscopy.     Procedural Pause:  A procedural pause verifying correct patient, medical record number, allergies, medications to be administered, current vital signs, and surgical site was performed immediately prior to beginning the procedure.    The skin and subcutaneous tissue overlying the target site(s) of injection for the L5-S1 transforaminal epidural steroid injection was/were anesthetized using 4 mL of 1% " lidocaine with a 25-gauge, 1½-inch needle.      The fluoroscopic beam was aligned to create a tunnel view.  The above noted needle was advanced parallel to the fluoroscopic beam towards the above noted foramen under fluoroscopic guidance.  The final position of the needle(s) was identified using AP and lateral views.  Paresthesias were not noted with final needle positioning.       A microbore extension tubing was attached to the needle to minimize any movement of the needle during injection or syringe change.  After negative aspiration for heme or CSF at each site(s) where the needle(s) was placed, 1ml of contrast dye was injected to confirm appropriate placement and that there was no vascular uptake.  Pain provocation by the injected contrast material was not noted.  Then the injectate solution described above was injected in increments.  The needle was then retracted approximately MCFP and the needle track was flushed with 0.5 mL of Lidocaine 1%.  The needle(s) was then removed.     The same procedural technique outlined above was repeated on the OPPOSITE side.    The heart rate, pulse oximetry, and blood pressure were continuously monitored throughout the procedure.  The procedure was well tolerated. He was carefully escorted to the recovery room in stable condition. Patient was monitored by RN for recovery period.  The patient will be contacted in the next few days to determine extent of relief.  Patient was given post procedure and discharge instructions to follow at home.  The patient was discharged in a stable condition.    Note Electronically Signed By:  Tyree Herndon  01/02/2024

## 2024-01-16 ENCOUNTER — TELEPHONE (OUTPATIENT)
Dept: PAIN MEDICINE | Facility: CLINIC | Age: 74
End: 2024-01-16
Payer: MEDICARE

## 2024-01-23 ENCOUNTER — OFFICE VISIT (OUTPATIENT)
Dept: PAIN MEDICINE | Facility: CLINIC | Age: 74
End: 2024-01-23
Payer: MEDICARE

## 2024-01-23 VITALS
SYSTOLIC BLOOD PRESSURE: 167 MMHG | HEART RATE: 86 BPM | DIASTOLIC BLOOD PRESSURE: 72 MMHG | WEIGHT: 162.94 LBS | HEIGHT: 68 IN | BODY MASS INDEX: 24.7 KG/M2

## 2024-01-23 DIAGNOSIS — M54.51 VERTEBROGENIC LOW BACK PAIN: Primary | ICD-10-CM

## 2024-01-23 DIAGNOSIS — M51.36 DDD (DEGENERATIVE DISC DISEASE), LUMBAR: ICD-10-CM

## 2024-01-23 PROCEDURE — 99999 PR PBB SHADOW E&M-EST. PATIENT-LVL IV: CPT | Mod: PBBFAC,,, | Performed by: STUDENT IN AN ORGANIZED HEALTH CARE EDUCATION/TRAINING PROGRAM

## 2024-01-23 PROCEDURE — 99214 OFFICE O/P EST MOD 30 MIN: CPT | Mod: S$GLB,,, | Performed by: STUDENT IN AN ORGANIZED HEALTH CARE EDUCATION/TRAINING PROGRAM

## 2024-01-23 NOTE — PROGRESS NOTES
Chronic Pain - Established Clinic Visit    Referring Physician: Karen Nguyen    Chief Complaint:   Chief Complaint   Patient presents with    Back Pain    Leg Pain        SUBJECTIVE:  INTERVAL HISTORY 1/23/2023:  Mr. Solomon returns for chronic lower back pain and bilateral leg pain.  He is s/p bilateral L5/S1 transforaminal epidural steroid injection. He reports that the pain that was radiating down his legs has improved for his bilateral leg pain.  He reports the bilateral leg pain and tingling is 4-5/10.  Prior to the injection, the pain was 10/10. Now, he feels the pain is primarily in the back.  He feels the pain is worst when he is getting out of bed.  He still feels some numbness to the right leg.  He reports his pain gets worse when he moves around his legs.  He reports today his pain level is 0/10 as he took ibuprofen and tylenol prior to coming in to clinic.    INTERVAL HISTORY 12/13/2023:  Mr. Solomon presents for lower back and bilateral leg pain. Current Pain level is 5/10.  He is s/p L5/S1 interlaminar epidural steroid injection with no significant improvement in his pain. He reports that he initially had minimal relief of his left leg, however the pain returned after the weekend. He reports that his pain is 10/10 in the morning and feels better with flexion and taking Ibuprofen.  Coughing/sneezing makes his pain worse.  He continues to try home exercises as tolerated with limited relief in his pain.  He brought in his MRI report and disc from his outside facility which again demonstrates multilevel disc degenerative changes with multilevel foraminal stenosis.     Interval History 11/16/2023:  73-year-old male presents today for a follow-up appointment to discuss his lumbar MRI and be considered for pain interventions.  His pain continues to complain of  low back with radiating symptoms into his legs posteriorly slightly pass his knees bilaterally.  He reports pulling and tightening symptoms in  his legs bilaterally walking, standing and repositioning his body increases his pain.   He denies any profound weakness denies any recent incident or trauma denies any bowel bladder dysfunction, denies any saddle anesthesia at this time.    David Solomon presents to the clinic for the evaluation of lower back pain. The pain started 6 weeks ago following lawn work and symptoms have been worsening.The pain is located in the lower back area and radiates to the hamstrings and down to his calves.  The pain is described as stabbing and is rated as 7/10. The pain is rated with a score of  0/10 on the BEST day and a score of 9/10 on the WORST day.  Symptoms interfere with daily activity. The pain is exacerbated by certain positions, walking, worse in the morning (getting out of bed).  The pain is mitigated by medications (ibuprofen and steroid (one dose).  The patient reports 6 hours of uninterrupted sleep per night.    He has tried biofreeze without relief.  He was given a medrol dose pack, but his blood glucose went extremely high, so he stopped it. He also had a steroid injection at an ED which also severely increased his blood sugar.  He sees his endocrinologist for over 20 years and is currently on a long acting and short acting regimen.  He checks his sugars 10 times a day.    He was seen by Dr. Gonsalves on 5/25/2020 for knee pain and was given some knee rehab exercises.  He continues to participate in [employer] fitness.    Patient denies urinary incontinence, bowel incontinence, and significant motor weakness.    Physical Therapy/Home Exercise: no      Pain Disability Index Review:      1/23/2024     3:18 PM 11/16/2023     8:33 AM 10/4/2023     2:06 PM   Last 3 PDI Scores   Pain Disability Index (PDI) 56 [phone] 44       Pain Medications:  Norco 7.5-325mg  Ibuprofen 800mg twice a day     report:  Reviewed and consistent with medication use as prescribed.    Pain Procedures:   None    Imaging:   Outside Lumbar MRI:  multilevel degenerative disease with central and foraminal stenosis at L4/5 and L5/S1    Past Medical History:   Diagnosis Date    Cataract     Diabetes mellitus     Diabetic retinopathy     Hypertension      Past Surgical History:   Procedure Laterality Date    CATARACT EXTRACTION Right 03/27/2022    Dr. Ricketts    EPIDURAL STEROID INJECTION INTO LUMBAR SPINE N/A 12/1/2023    Procedure: L5-S1 LESI;  Surgeon: Tomeka Lowry MD;  [facility];  Service: Pain Management;  Laterality: N/A;  oral 20 mins    EPIDURAL STEROID INJECTION INTO LUMBAR SPINE Bilateral 1/2/2024    Procedure: B/L L5-S1 TFESI;  Surgeon: Tyree Ochoa DO;  [facility];  Service: Pain Management;  Laterality: Bilateral;  20 mins    INTRAOCULAR PROSTHESES INSERTION Right 3/27/2022    Procedure: INSERTION, IOL PROSTHESIS;  Surgeon: Divine Ricketts MD;  Location: [facility] OR [address] [place] [address];  Service: Ophthalmology;  Laterality: Right;    INTRAOCULAR PROSTHESES INSERTION Left 5/15/2022    Procedure: INSERTION, IOL PROSTHESIS;  Surgeon: Divine Ricketts MD;  Location: [facility] OR [address] [place] [address];  Service: Ophthalmology;  Laterality: Left;    PHACOEMULSIFICATION OF CATARACT Right 3/27/2022    Procedure: PHACOEMULSIFICATION, CATARACT;  Surgeon: Divine Ricketts MD;  Location: [facility] OR [address] [place] [address];  Service: Ophthalmology;  Laterality: Right;    PHACOEMULSIFICATION OF CATARACT Left 5/15/2022    Procedure: PHACOEMULSIFICATION, CATARACT;  Surgeon: Divine Ricketts MD;  Location: [facility] OR [address] [place] [address];  Service: Ophthalmology;  Laterality: Left;     Social History     Socioeconomic History    Marital status:    Tobacco Use    Smoking status: Never    Smokeless tobacco: Never   Substance and Sexual Activity    Alcohol use: No    Drug use: No    Sexual activity: Not Currently     Family History   Problem Relation Age of Onset    No Known Problems Mother     No Known Problems Father     Melanoma Neg Hx        Review of patient's  allergies indicates:  No Known Allergies    Current Outpatient Medications   Medication Sig    atorvastatin (LIPITOR) 40 MG tablet Take 40 mg by mouth every evening.    diltiaZEM (CARDIZEM LA) 180 mg 24 hr tablet Take 180 mg by mouth once daily.    HUMALOG KWIKPEN INSULIN 100 unit/mL pen Inject into the skin.    hydroCHLOROthiazide (HYDRODIURIL) 12.5 MG Tab Take 12.5 mg by mouth once daily.    insulin glargine (LANTUS) 100 unit/mL injection Inject 22 Units into the skin once daily.    irbesartan (AVAPRO) 75 MG tablet Take 75 mg by mouth every evening.    meloxicam (MOBIC) 15 MG tablet Take 15 mg by mouth.     No current facility-administered medications for this visit.     Facility-Administered Medications Ordered in Other Visits   Medication    phenylephrine HCL 2.5% ophthalmic solution 1 drop    phenylephrine HCL 2.5% ophthalmic solution 1 drop    proparacaine 0.5 % ophthalmic solution 1 drop    proparacaine 0.5 % ophthalmic solution 1 drop    tropicamide 1% ophthalmic solution 1 drop    tropicamide 1% ophthalmic solution 1 drop       REVIEW OF SYSTEMS:    GENERAL:  No weight loss, malaise or fevers.  HEENT:  Negative for frequent or significant headaches.  NECK:  Negative for lumps, goiter, pain and significant neck swelling.  RESPIRATORY:  Negative for cough, wheezing or shortness of breath.  CARDIOVASCULAR:  Negative for chest pain, leg swelling or palpitations.  GI:  Negative for abdominal discomfort, blood in stools or black stools or change in bowel habits.  MUSCULOSKELETAL:  See HPI.  SKIN:  Negative for lesions, rash, and itching.  PSYCH:  Negative for sleep disturbance, mood disorder and recent psychosocial stressors.  HEMATOLOGY/LYMPHOLOGY:  Negative for prolonged bleeding, bruising easily or swollen nodes. +DM type 1  NEURO:   No history of headaches, syncope, paralysis, seizures or tremors.  All other reviewed and negative other than HPI.    OBJECTIVE:    BP (!) 167/72 (BP Location: Left arm, Patient  "Position: Sitting)   Pulse 86   Ht 5' 8" (1.727 m)   Wt 73.9 kg (162 lb 14.7 oz)   BMI 24.77 kg/m²     PHYSICAL EXAMINATION:  General appearance: Well appearing, in no acute distress, alert and appropriately communicative.  Psych:  Mood and affect appropriate.  Skin: Skin color, texture, turgor normal, no rashes or lesions, in both upper and lower body.  Head/face:  Atraumatic, normocephalic.  Cor: regular rate  Pulm: non-labored breathing  GI: Abdomen non-distended and non-tender.  Back: Straight leg raising in the sitting and supine positions is positive to radicular pain left > right. No pain to palpation over the spine or paraspinal muscles. Pain with flexion of lumbar spine. +Milgrams.  Extremities: Peripheral joint ROM is full and pain free without obvious instability or laxity in all four extremities. No deformities, edema, or skin discoloration. Good capillary refill.  Musculoskeletal:  hip, sacroiliac and knee provocative maneuvers are negative. Bilateral upper and lower extremity strength is normal and symmetric.  No atrophy or tone abnormalities are noted.  Neuro: Bilateral upper and lower extremity coordination and muscle stretch reflexes are physiologic and symmetric.  Negative Clonus. No loss of sensation is noted.  Gait: Normal.    Lab Results   Component Value Date    CREATININE 1.16 03/17/2017     CMP  Sodium   Date Value Ref Range Status   03/17/2017 145 136 - 145 mmol/L Final     Potassium   Date Value Ref Range Status   03/17/2017 3.6 3.5 - 5.1 mmol/L Final     Chloride   Date Value Ref Range Status   03/17/2017 103 95 - 110 mmol/L Final     CO2   Date Value Ref Range Status   03/17/2017 30 (H) 23 - 29 mmol/L Final     Glucose   Date Value Ref Range Status   03/17/2017 99 70 - 110 mg/dL Final     BUN   Date Value Ref Range Status   03/17/2017 11 2 - 20 mg/dL Final     Creatinine   Date Value Ref Range Status   03/17/2017 1.16 0.50 - 1.40 mg/dL Final     Calcium   Date Value Ref Range Status "   03/17/2017 9.5 8.7 - 10.5 mg/dL Final     Total Protein   Date Value Ref Range Status   03/17/2017 7.6 6.0 - 8.4 g/dL Final     Albumin   Date Value Ref Range Status   03/17/2017 4.4 3.5 - 5.2 g/dL Final     Total Bilirubin   Date Value Ref Range Status   03/17/2017 0.8 0.1 - 1.0 mg/dL Final     Comment:     For infants and newborns, interpretation of results should be based  on gestational age, weight and in agreement with clinical  observations.  Premature Infant recommended reference ranges:  Up to 24 hours.............<8.0 mg/dL  Up to 48 hours............<12.0 mg/dL  3-5 days..................<15.0 mg/dL  6-29 days.................<15.0 mg/dL       Alkaline Phosphatase   Date Value Ref Range Status   03/17/2017 82 38 - 126 U/L Final     AST   Date Value Ref Range Status   03/17/2017 30 15 - 46 U/L Final     ALT   Date Value Ref Range Status   03/17/2017 31 10 - 44 U/L Final     Anion Gap   Date Value Ref Range Status   03/17/2017 12 8 - 16 mmol/L Final         ASSESSMENT: 73 y.o. year old male with lower back pain, consistent with:     1. Vertebrogenic low back pain  Procedure Order to Pain Management    Case Request Operating Room: DESTRUCTION,INTRAOSSEOUS BASIVERTEBRAL NERVE,1ST TWO BODIES,LUM/SAC      2. DDD (degenerative disc disease), lumbar  Ambulatory referral/consult to Orthopedics    Procedure Order to Pain Management        IMPRESSION: Mr. Solomon returns with lower back and bilateral lower extremity pain.  He is s/p L5/S1 interlaminar epidural injection with no improvement in his pain. History and physical exam are consistent with lumbar radiculopathy, specifically following the L5 distribution bilaterally. Imaging is consistent with lumbar degenerative disc disease with central and foraminal stenosis at L4/5 and L5/S1.  He has failed the interlaminar epidural, however he may have benefit from a bilateral transforaminal L5/S1 epidural based on his symptoms and physical exam.    PLAN:   - I have  stressed the importance of physical activity and a home exercise plan to help with pain and improve health.  - referral to orthopedic surgery (Dr. Yeh) to discuss options  - tentatively schedule for L4 and L5 intracept procedure.  - continue home exercise program as he has been doing so far  - counseled on not combining NSAIDs.  He can alternatively take tylenol along with NSAIDs if necessary.  - follow up in clinic after intracept procedure    The above plan and management options were discussed at length with patient. Patient is in agreement with the above and verbalized understanding. It will be communicated with the referring physician via electronic record, fax, or mail.    Tomeka Lowry MD  Interventional Pain Management    01/23/2024

## 2024-01-23 NOTE — H&P (VIEW-ONLY)
Chronic Pain - Established Clinic Visit    Referring Physician: Karen Nguyen    Chief Complaint:   Chief Complaint   Patient presents with    Back Pain    Leg Pain        SUBJECTIVE:  INTERVAL HISTORY 1/23/2023:  Mr. Solomon returns for chronic lower back pain and bilateral leg pain.  He is s/p bilateral L5/S1 transforaminal epidural steroid injection. He reports that the pain that was radiating down his legs has improved for his bilateral leg pain.  He reports the bilateral leg pain and tingling is 4-5/10.  Prior to the injection, the pain was 10/10. Now, he feels the pain is primarily in the back.  He feels the pain is worst when he is getting out of bed.  He still feels some numbness to the right leg.  He reports his pain gets worse when he moves around his legs.  He reports today his pain level is 0/10 as he took ibuprofen and tylenol prior to coming in to clinic.    INTERVAL HISTORY 12/13/2023:  Mr. Solomon presents for lower back and bilateral leg pain. Current Pain level is 5/10.  He is s/p L5/S1 interlaminar epidural steroid injection with no significant improvement in his pain. He reports that he initially had minimal relief of his left leg, however the pain returned after the weekend. He reports that his pain is 10/10 in the morning and feels better with flexion and taking Ibuprofen.  Coughing/sneezing makes his pain worse.  He continues to try home exercises as tolerated with limited relief in his pain.  He brought in his MRI report and disc from his outside facility which again demonstrates multilevel disc degenerative changes with multilevel foraminal stenosis.     Interval History 11/16/2023:  73-year-old male presents today for a follow-up appointment to discuss his lumbar MRI and be considered for pain interventions.  His pain continues to complain of  low back with radiating symptoms into his legs posteriorly slightly pass his knees bilaterally.  He reports pulling and tightening symptoms in  his legs bilaterally walking, standing and repositioning his body increases his pain.   He denies any profound weakness denies any recent incident or trauma denies any bowel bladder dysfunction, denies any saddle anesthesia at this time.    David Solomon presents to the clinic for the evaluation of lower back pain. The pain started 6 weeks ago following lawn work and symptoms have been worsening.The pain is located in the lower back area and radiates to the hamstrings and down to his calves.  The pain is described as stabbing and is rated as 7/10. The pain is rated with a score of  0/10 on the BEST day and a score of 9/10 on the WORST day.  Symptoms interfere with daily activity. The pain is exacerbated by certain positions, walking, worse in the morning (getting out of bed).  The pain is mitigated by medications (ibuprofen and steroid (one dose).  The patient reports 6 hours of uninterrupted sleep per night.    He has tried biofreeze without relief.  He was given a medrol dose pack, but his blood glucose went extremely high, so he stopped it. He also had a steroid injection at an ED which also severely increased his blood sugar.  He sees his endocrinologist for over 20 years and is currently on a long acting and short acting regimen.  He checks his sugars 10 times a day.    He was seen by Dr. Gonsalves on 5/25/2020 for knee pain and was given some knee rehab exercises.  He continues to participate in [employer] fitness.    Patient denies urinary incontinence, bowel incontinence, and significant motor weakness.    Physical Therapy/Home Exercise: no      Pain Disability Index Review:      1/23/2024     3:18 PM 11/16/2023     8:33 AM 10/4/2023     2:06 PM   Last 3 PDI Scores   Pain Disability Index (PDI) 56 52 44       Pain Medications:  Norco 7.5-325mg  Ibuprofen 800mg twice a day     report:  Reviewed and consistent with medication use as prescribed.    Pain Procedures:   None    Imaging:   Outside Lumbar MRI:  multilevel degenerative disease with central and foraminal stenosis at L4/5 and L5/S1    Past Medical History:   Diagnosis Date    Cataract     Diabetes mellitus     Diabetic retinopathy     Hypertension      Past Surgical History:   Procedure Laterality Date    CATARACT EXTRACTION Right 03/27/2022    Dr. Ricketts    EPIDURAL STEROID INJECTION INTO LUMBAR SPINE N/A 12/1/2023    Procedure: L5-S1 LESI;  Surgeon: Tomeka Lowry MD;  Location: [facility];  Service: Pain Management;  Laterality: N/A;  oral 20 mins    EPIDURAL STEROID INJECTION INTO LUMBAR SPINE Bilateral 1/2/2024    Procedure: B/L L5-S1 TFESI;  Surgeon: Tyree Ochoa DO;  Location: [facility] PAIN [facility]NA[facility]EMENT;  Service: Pain Management;  Laterality: Bilateral;  20 mins    INTRAOCULAR PROSTHESES INSERTION Right 3/27/2022    Procedure: INSERTION, IOL PROSTHESIS;  Surgeon: Divine Ricketts MD;  Location: [facility] OR [address] [place] [address];  Service: Ophthalmology;  Laterality: Right;    INTRAOCULAR PROSTHESES INSERTION Left 5/15/2022    Procedure: INSERTION, IOL PROSTHESIS;  Surgeon: Divine Ricketts MD;  Location: [facility] OR [address] [place] [address];  Service: Ophthalmology;  Laterality: Left;    PHACOEMULSIFICATION OF CATARACT Right 3/27/2022    Procedure: PHACOEMULSIFICATION, CATARACT;  Surgeon: Divine Ricketts MD;  Location: [facility] OR [address] [place] [address];  Service: Ophthalmology;  Laterality: Right;    PHACOEMULSIFICATION OF CATARACT Left 5/15/2022    Procedure: PHACOEMULSIFICATION, CATARACT;  Surgeon: Divine Ricketts MD;  Location: [facility] OR [address] [place] [address];  Service: Ophthalmology;  Laterality: Left;     Social History     Socioeconomic History    Marital status:    Tobacco Use    Smoking status: Never    Smokeless tobacco: Never   Substance and Sexual Activity    Alcohol use: No    Drug use: No    Sexual activity: Not Currently     Family History   Problem Relation Age of Onset    No Known Problems Mother     No Known Problems Father     Melanoma Neg Hx        Review of patient's  allergies indicates:  No Known Allergies    Current Outpatient Medications   Medication Sig    atorvastatin (LIPITOR) 40 MG tablet Take 40 mg by mouth every evening.    diltiaZEM (CARDIZEM LA) 180 mg 24 hr tablet Take 180 mg by mouth once daily.    HUMALOG KWIKPEN INSULIN 100 unit/mL pen Inject into the skin.    hydroCHLOROthiazide (HYDRODIURIL) 12.5 MG Tab Take 12.5 mg by mouth once daily.    insulin glargine (LANTUS) 100 unit/mL injection Inject 22 Units into the skin once daily.    irbesartan (AVAPRO) 75 MG tablet Take 75 mg by mouth every evening.    meloxicam (MOBIC) 15 MG tablet Take 15 mg by mouth.     No current facility-administered medications for this visit.     Facility-Administered Medications Ordered in Other Visits   Medication    phenylephrine HCL 2.5% ophthalmic solution 1 drop    phenylephrine HCL 2.5% ophthalmic solution 1 drop    proparacaine 0.5 % ophthalmic solution 1 drop    proparacaine 0.5 % ophthalmic solution 1 drop    tropicamide 1% ophthalmic solution 1 drop    tropicamide 1% ophthalmic solution 1 drop       REVIEW OF SYSTEMS:    GENERAL:  No weight loss, malaise or fevers.  HEENT:  Negative for frequent or significant headaches.  NECK:  Negative for lumps, goiter, pain and significant neck swelling.  RESPIRATORY:  Negative for cough, wheezing or shortness of breath.  CARDIOVASCULAR:  Negative for chest pain, leg swelling or palpitations.  GI:  Negative for abdominal discomfort, blood in stools or black stools or change in bowel habits.  MUSCULOSKELETAL:  See HPI.  SKIN:  Negative for lesions, rash, and itching.  PSYCH:  Negative for sleep disturbance, mood disorder and recent psychosocial stressors.  HEMATOLOGY/LYMPHOLOGY:  Negative for prolonged bleeding, bruising easily or swollen nodes. +DM type 1  NEURO:   No history of headaches, syncope, paralysis, seizures or tremors.  All other reviewed and negative other than HPI.    OBJECTIVE:    BP (!) 167/72 (BP Location: Left arm, Patient  "Position: Sitting)   Pulse 86   Ht 5' 8" (1.727 m)   Wt 73.9 kg (162 lb 14.7 oz)   BMI 24.77 kg/m²     PHYSICAL EXAMINATION:  General appearance: Well appearing, in no acute distress, alert and appropriately communicative.  Psych:  Mood and affect appropriate.  Skin: Skin color, texture, turgor normal, no rashes or lesions, in both upper and lower body.  Head/face:  Atraumatic, normocephalic.  Cor: regular rate  Pulm: non-labored breathing  GI: Abdomen non-distended and non-tender.  Back: Straight leg raising in the sitting and supine positions is positive to radicular pain left > right. No pain to palpation over the spine or paraspinal muscles. Pain with flexion of lumbar spine. +Milgrams.  Extremities: Peripheral joint ROM is full and pain free without obvious instability or laxity in all four extremities. No deformities, edema, or skin discoloration. Good capillary refill.  Musculoskeletal:  hip, sacroiliac and knee provocative maneuvers are negative. Bilateral upper and lower extremity strength is normal and symmetric.  No atrophy or tone abnormalities are noted.  Neuro: Bilateral upper and lower extremity coordination and muscle stretch reflexes are physiologic and symmetric.  Negative Clonus. No loss of sensation is noted.  Gait: Normal.    Lab Results   Component Value Date    CREATININE 1.16 03/17/2017     CMP  Sodium   Date Value Ref Range Status   03/17/2017 145 136 - 145 mmol/L Final     Potassium   Date Value Ref Range Status   03/17/2017 3.6 3.5 - 5.1 mmol/L Final     Chloride   Date Value Ref Range Status   03/17/2017 103 95 - 110 mmol/L Final     CO2   Date Value Ref Range Status   03/17/2017 30 (H) 23 - 29 mmol/L Final     Glucose   Date Value Ref Range Status   03/17/2017 99 70 - 110 mg/dL Final     BUN   Date Value Ref Range Status   03/17/2017 11 2 - 20 mg/dL Final     Creatinine   Date Value Ref Range Status   03/17/2017 1.16 0.50 - 1.40 mg/dL Final     Calcium   Date Value Ref Range Status "   03/17/2017 9.5 8.7 - 10.5 mg/dL Final     Total Protein   Date Value Ref Range Status   03/17/2017 7.6 6.0 - 8.4 g/dL Final     Albumin   Date Value Ref Range Status   03/17/2017 4.4 3.5 - 5.2 g/dL Final     Total Bilirubin   Date Value Ref Range Status   03/17/2017 0.8 0.1 - 1.0 mg/dL Final     Comment:     For infants and newborns, interpretation of results should be based  on gestational age, weight and in agreement with clinical  observations.  Premature Infant recommended reference ranges:  Up to 24 hours.............<8.0 mg/dL  Up to 48 hours............<12.0 mg/dL  3-5 days..................<15.0 mg/dL  6-29 days.................<15.0 mg/dL       Alkaline Phosphatase   Date Value Ref Range Status   03/17/2017 82 38 - 126 U/L Final     AST   Date Value Ref Range Status   03/17/2017 30 15 - 46 U/L Final     ALT   Date Value Ref Range Status   03/17/2017 31 10 - 44 U/L Final     Anion Gap   Date Value Ref Range Status   03/17/2017 12 8 - 16 mmol/L Final         ASSESSMENT: 73 y.o. year old male with lower back pain, consistent with:     1. Vertebrogenic low back pain  Procedure Order to Pain Management    Case Request Operating Room: DESTRUCTION,INTRAOSSEOUS BASIVERTEBRAL NERVE,1ST TWO BODIES,LUM/SAC      2. DDD (degenerative disc disease), lumbar  Ambulatory referral/consult to Orthopedics    Procedure Order to Pain Management        IMPRESSION: Mr. Solomon returns with lower back and bilateral lower extremity pain.  He is s/p L5/S1 interlaminar epidural injection with no improvement in his pain. History and physical exam are consistent with lumbar radiculopathy, specifically following the L5 distribution bilaterally. Imaging is consistent with lumbar degenerative disc disease with central and foraminal stenosis at L4/5 and L5/S1.  He has failed the interlaminar epidural, however he may have benefit from a bilateral transforaminal L5/S1 epidural based on his symptoms and physical exam.    PLAN:   - I have  stressed the importance of physical activity and a home exercise plan to help with pain and improve health.  - referral to orthopedic surgery (Dr. Yeh) to discuss options  - tentatively schedule for L4 and L5 intracept procedure.  - continue home exercise program as he has been doing so far  - counseled on not combining NSAIDs.  He can alternatively take tylenol along with NSAIDs if necessary.  - follow up in clinic after intracept procedure    The above plan and management options were discussed at length with patient. Patient is in agreement with the above and verbalized understanding. It will be communicated with the referring physician via electronic record, fax, or mail.    Tomeka Lowry MD  Interventional Pain Management    01/23/2024

## 2024-02-06 ENCOUNTER — PATIENT MESSAGE (OUTPATIENT)
Dept: PAIN MEDICINE | Facility: CLINIC | Age: 74
End: 2024-02-06
Payer: MEDICARE

## 2024-02-16 NOTE — PRE-PROCEDURE INSTRUCTIONS
The following was discussed with pt via phone and sent to pt portal. Pt verbalized understanding. Pt to be accompanied by his friend.    Dear David ,    You are scheduled for a procedure with Dr. Lowry on 2/19/2024. Your scheduled arrival time is 5:15am.  This arrival time is roughly 2 hours before your anticipated procedure time to allow sufficient time for pre-op.  Please wear comfortable clothes.  This procedure will take place at the [facility] at the corner of [facility].  [facility] to [facility].  The address is:    [address]  [place] [address]    After entering the building, you will proceed to the second floor where you can check in with registration. You should take any medications that you routinely take for blood pressure (other than those listed below), heart medications, thyroid, cholesterol, etc.     If you wear contact lenses, please wear glasses to your procedure.    Your fasting instructions are as follow:  Nothing to eat after midnight Jonathon night. You may drink clear liquids up until 2 hours prior to your arrival time. You MUST have a responsible adult to bring you home.      Jonathon evening and Monday morning, please hold the following medications:  -Aspirin and Aspirin-containing products (Goody's powder, Excedrin)  -NSAIDs (Advil, Ibuprofen, Aleve, Diclofenac)  -Vitamins/Supplements  -Herbal remedies/Teas  -Stimulants (Adderall, Vyvanse, Adipex)  -Diabetic medication (Please bring with you day of procedure)  -HUMALOG  -HYDROCHLOROTHIAZIDE  -IRBESARTAN  -MELOXICAM  -LANTUS - TAKE 11 UNITS MONDAY MORNING    -May take Tylenol      Sunday evening and Monday morning, take a shower using antibacterial soap (ex: Hibiclens or Dial antibacterial soap). DO NOT apply deodorant, lotion, cologne, or anything else to the skin. Wear loose, comfortable fitting clothing. Do not wear jewelry or bring any valuables with you.  If you wear dentures or contacts, please bring your case with you or leave them at home. Use and bring any inhalers that you may have.    If you have any procedure-specific questions, please call your surgeon's office. Any other questions, don't hesitate to call at [phone].    Thanks,  FRANCK Quintanilla  Pre-Admit Testing  Anesthesia Dept [facility]

## 2024-02-19 ENCOUNTER — ANESTHESIA (OUTPATIENT)
Dept: SURGERY | Facility: HOSPITAL | Age: 74
End: 2024-02-19
Payer: MEDICARE

## 2024-02-19 ENCOUNTER — HOSPITAL ENCOUNTER (OUTPATIENT)
Facility: HOSPITAL | Age: 74
Discharge: HOME | End: 2024-02-19
Attending: STUDENT IN AN ORGANIZED HEALTH CARE EDUCATION/TRAINING PROGRAM | Admitting: ANESTHESIOLOGY
Payer: MEDICARE

## 2024-02-19 ENCOUNTER — ANESTHESIA EVENT (OUTPATIENT)
Dept: SURGERY | Facility: HOSPITAL | Age: 74
End: 2024-02-19
Payer: MEDICARE

## 2024-02-19 VITALS
OXYGEN SATURATION: 96 % | SYSTOLIC BLOOD PRESSURE: 154 MMHG | TEMPERATURE: 98 F | DIASTOLIC BLOOD PRESSURE: 67 MMHG | HEART RATE: 74 BPM | RESPIRATION RATE: 16 BRPM

## 2024-02-19 DIAGNOSIS — M54.89 VERTEBROGENIC PAIN SYNDROME: Primary | ICD-10-CM

## 2024-02-19 DIAGNOSIS — M54.51 VERTEBROGENIC LOW BACK PAIN: Primary | ICD-10-CM

## 2024-02-19 DIAGNOSIS — G89.29 CHRONIC PAIN: ICD-10-CM

## 2024-02-19 LAB
POCT GLUCOSE: 135 MG/DL (ref 70–110)
POCT GLUCOSE: 137 MG/DL (ref 70–110)

## 2024-02-19 PROCEDURE — 25000003 PHARM REV CODE 250: Performed by: STUDENT IN AN ORGANIZED HEALTH CARE EDUCATION/TRAINING PROGRAM

## 2024-02-19 PROCEDURE — 36000707: Performed by: STUDENT IN AN ORGANIZED HEALTH CARE EDUCATION/TRAINING PROGRAM

## 2024-02-19 PROCEDURE — 82962 GLUCOSE BLOOD TEST: CPT | Performed by: STUDENT IN AN ORGANIZED HEALTH CARE EDUCATION/TRAINING PROGRAM

## 2024-02-19 PROCEDURE — 25000003 PHARM REV CODE 250: Performed by: NURSE ANESTHETIST, CERTIFIED REGISTERED

## 2024-02-19 PROCEDURE — 63600175 PHARM REV CODE 636 W HCPCS: Performed by: NURSE ANESTHETIST, CERTIFIED REGISTERED

## 2024-02-19 PROCEDURE — 99900035 HC TECH TIME PER 15 MIN (STAT)

## 2024-02-19 PROCEDURE — 63600175 PHARM REV CODE 636 W HCPCS: Mod: JZ,JG | Performed by: STUDENT IN AN ORGANIZED HEALTH CARE EDUCATION/TRAINING PROGRAM

## 2024-02-19 PROCEDURE — 64628 TRML DSTRJ IOS BVN 1ST 2 L/S: CPT | Mod: ,,, | Performed by: STUDENT IN AN ORGANIZED HEALTH CARE EDUCATION/TRAINING PROGRAM

## 2024-02-19 PROCEDURE — 37000008 HC ANESTHESIA 1ST 15 MINUTES: Performed by: STUDENT IN AN ORGANIZED HEALTH CARE EDUCATION/TRAINING PROGRAM

## 2024-02-19 PROCEDURE — C1886 CATHETER, ABLATION: HCPCS | Performed by: STUDENT IN AN ORGANIZED HEALTH CARE EDUCATION/TRAINING PROGRAM

## 2024-02-19 PROCEDURE — 71000033 HC RECOVERY, INTIAL HOUR: Performed by: STUDENT IN AN ORGANIZED HEALTH CARE EDUCATION/TRAINING PROGRAM

## 2024-02-19 PROCEDURE — 63600175 PHARM REV CODE 636 W HCPCS: Performed by: STUDENT IN AN ORGANIZED HEALTH CARE EDUCATION/TRAINING PROGRAM

## 2024-02-19 PROCEDURE — 71000015 HC POSTOP RECOV 1ST HR: Performed by: STUDENT IN AN ORGANIZED HEALTH CARE EDUCATION/TRAINING PROGRAM

## 2024-02-19 PROCEDURE — 36000706: Performed by: STUDENT IN AN ORGANIZED HEALTH CARE EDUCATION/TRAINING PROGRAM

## 2024-02-19 PROCEDURE — 37000009 HC ANESTHESIA EA ADD 15 MINS: Performed by: STUDENT IN AN ORGANIZED HEALTH CARE EDUCATION/TRAINING PROGRAM

## 2024-02-19 PROCEDURE — 82962 GLUCOSE BLOOD TEST: CPT | Mod: 91 | Performed by: STUDENT IN AN ORGANIZED HEALTH CARE EDUCATION/TRAINING PROGRAM

## 2024-02-19 PROCEDURE — 94761 N-INVAS EAR/PLS OXIMETRY MLT: CPT

## 2024-02-19 PROCEDURE — D9220A PRA ANESTHESIA: Mod: ,,, | Performed by: NURSE ANESTHETIST, CERTIFIED REGISTERED

## 2024-02-19 PROCEDURE — 27201423 OPTIME MED/SURG SUP & DEVICES STERILE SUPPLY: Performed by: STUDENT IN AN ORGANIZED HEALTH CARE EDUCATION/TRAINING PROGRAM

## 2024-02-19 RX ORDER — MIDAZOLAM HYDROCHLORIDE 1 MG/ML
INJECTION, SOLUTION INTRAMUSCULAR; INTRAVENOUS
Status: DISCONTINUED | OUTPATIENT
Start: 2024-02-19 | End: 2024-02-19

## 2024-02-19 RX ORDER — KETAMINE HYDROCHLORIDE 100 MG/ML
INJECTION, SOLUTION INTRAMUSCULAR; INTRAVENOUS
Status: DISCONTINUED | OUTPATIENT
Start: 2024-02-19 | End: 2024-02-19

## 2024-02-19 RX ORDER — BUPIVACAINE HYDROCHLORIDE 5 MG/ML
INJECTION, SOLUTION EPIDURAL; INTRACAUDAL
Status: DISCONTINUED | OUTPATIENT
Start: 2024-02-19 | End: 2024-02-19 | Stop reason: HOSPADM

## 2024-02-19 RX ORDER — ONDANSETRON HYDROCHLORIDE 2 MG/ML
4 INJECTION, SOLUTION INTRAVENOUS DAILY PRN
Status: DISCONTINUED | OUTPATIENT
Start: 2024-02-19 | End: 2024-02-19 | Stop reason: HOSPADM

## 2024-02-19 RX ORDER — SODIUM CHLORIDE 9 MG/ML
INJECTION, SOLUTION INTRAVENOUS CONTINUOUS
Status: DISCONTINUED | OUTPATIENT
Start: 2024-02-19 | End: 2024-02-19 | Stop reason: HOSPADM

## 2024-02-19 RX ORDER — LIDOCAINE HYDROCHLORIDE 10 MG/ML
1 INJECTION, SOLUTION EPIDURAL; INFILTRATION; INTRACAUDAL; PERINEURAL ONCE AS NEEDED
Status: DISCONTINUED | OUTPATIENT
Start: 2024-02-19 | End: 2024-02-19 | Stop reason: HOSPADM

## 2024-02-19 RX ORDER — LIDOCAINE HYDROCHLORIDE 20 MG/ML
INJECTION, SOLUTION EPIDURAL; INFILTRATION; INTRACAUDAL; PERINEURAL
Status: DISCONTINUED | OUTPATIENT
Start: 2024-02-19 | End: 2024-02-19

## 2024-02-19 RX ORDER — ONDANSETRON HYDROCHLORIDE 2 MG/ML
INJECTION, SOLUTION INTRAVENOUS
Status: DISCONTINUED | OUTPATIENT
Start: 2024-02-19 | End: 2024-02-19

## 2024-02-19 RX ORDER — LIDOCAINE HYDROCHLORIDE AND EPINEPHRINE 10; 10 MG/ML; UG/ML
INJECTION, SOLUTION INFILTRATION; PERINEURAL
Status: DISCONTINUED | OUTPATIENT
Start: 2024-02-19 | End: 2024-02-19 | Stop reason: HOSPADM

## 2024-02-19 RX ORDER — PROPOFOL 10 MG/ML
VIAL (ML) INTRAVENOUS CONTINUOUS PRN
Status: DISCONTINUED | OUTPATIENT
Start: 2024-02-19 | End: 2024-02-19

## 2024-02-19 RX ORDER — DEXAMETHASONE SODIUM PHOSPHATE 4 MG/ML
INJECTION, SOLUTION INTRA-ARTICULAR; INTRALESIONAL; INTRAMUSCULAR; INTRAVENOUS; SOFT TISSUE
Status: DISCONTINUED | OUTPATIENT
Start: 2024-02-19 | End: 2024-02-19

## 2024-02-19 RX ORDER — CEFAZOLIN SODIUM 1 G/3ML
2 INJECTION, POWDER, FOR SOLUTION INTRAMUSCULAR; INTRAVENOUS
Status: COMPLETED | OUTPATIENT
Start: 2024-02-19 | End: 2024-02-19

## 2024-02-19 RX ORDER — HYDROMORPHONE HYDROCHLORIDE 1 MG/ML
0.5 INJECTION, SOLUTION INTRAMUSCULAR; INTRAVENOUS; SUBCUTANEOUS EVERY 5 MIN PRN
Status: DISCONTINUED | OUTPATIENT
Start: 2024-02-19 | End: 2024-02-19 | Stop reason: HOSPADM

## 2024-02-19 RX ORDER — HYDROCODONE BITARTRATE AND ACETAMINOPHEN 5; 325 MG/1; MG/1
1 TABLET ORAL EVERY 8 HOURS PRN
Qty: 21 TABLET | Refills: 0 | Status: SHIPPED | OUTPATIENT
Start: 2024-02-19

## 2024-02-19 RX ORDER — OXYCODONE HYDROCHLORIDE 5 MG/1
5 TABLET ORAL
Status: DISCONTINUED | OUTPATIENT
Start: 2024-02-19 | End: 2024-02-19 | Stop reason: HOSPADM

## 2024-02-19 RX ADMIN — MIDAZOLAM HYDROCHLORIDE 2 MG: 1 INJECTION, SOLUTION INTRAMUSCULAR; INTRAVENOUS at 07:02

## 2024-02-19 RX ADMIN — PROPOFOL 50 MG: 10 INJECTION, EMULSION INTRAVENOUS at 07:02

## 2024-02-19 RX ADMIN — SODIUM CHLORIDE: 9 INJECTION, SOLUTION INTRAVENOUS at 05:02

## 2024-02-19 RX ADMIN — HYDROMORPHONE HYDROCHLORIDE 0.5 MG: 1 INJECTION, SOLUTION INTRAMUSCULAR; INTRAVENOUS; SUBCUTANEOUS at 09:02

## 2024-02-19 RX ADMIN — PROPOFOL 125 MCG/KG/MIN: 10 INJECTION, EMULSION INTRAVENOUS at 07:02

## 2024-02-19 RX ADMIN — SODIUM CHLORIDE: 9 INJECTION, SOLUTION INTRAVENOUS at 08:02

## 2024-02-19 RX ADMIN — ONDANSETRON 4 MG: 2 INJECTION INTRAMUSCULAR; INTRAVENOUS at 08:02

## 2024-02-19 RX ADMIN — KETAMINE HYDROCHLORIDE 10 MG: 100 INJECTION INTRAMUSCULAR; INTRAVENOUS at 08:02

## 2024-02-19 RX ADMIN — LIDOCAINE HYDROCHLORIDE 100 MG: 20 INJECTION, SOLUTION EPIDURAL; INFILTRATION; INTRACAUDAL; PERINEURAL at 07:02

## 2024-02-19 RX ADMIN — DEXAMETHASONE SODIUM PHOSPHATE 4 MG: 4 INJECTION INTRA-ARTICULAR; INTRALESIONAL; INTRAMUSCULAR; INTRAVENOUS; SOFT TISSUE at 08:02

## 2024-02-19 RX ADMIN — PROPOFOL 20 MG: 10 INJECTION, EMULSION INTRAVENOUS at 07:02

## 2024-02-19 RX ADMIN — CEFAZOLIN 2 G: 330 INJECTION, POWDER, FOR SOLUTION INTRAMUSCULAR; INTRAVENOUS at 07:02

## 2024-02-19 NOTE — TRANSFER OF CARE
Anesthesia Transfer of Care Note    Patient: David Solomon    Procedure(s) Performed: Procedure(s) (LRB):  DESTRUCTION,INTRAOSSEOUS BASIVERTEBRAL NERVE,1ST TWO BODIES,LUM/SAC (N/A)    Patient location: PACU    Anesthesia Type: general    Transport from OR: Transported from OR on room air with adequate spontaneous ventilation    Post pain: adequate analgesia    Post assessment: no apparent anesthetic complications    Post vital signs: stable    Level of consciousness: awake    Nausea/Vomiting: no nausea/vomiting    Complications: none    Transfer of care protocol was followed      Last vitals: Visit Vitals  BP (!) 178/77   Pulse 70   Temp 36.9 °C (98.4 °F) (Temporal)   Resp 12   SpO2 99%

## 2024-02-19 NOTE — ANESTHESIA POSTPROCEDURE EVALUATION
Anesthesia Post Evaluation    Patient: David Solomon    Procedure(s) Performed: Procedure(s) (LRB):  DESTRUCTION,INTRAOSSEOUS BASIVERTEBRAL NERVE,1ST TWO BODIES,LUM/SAC (N/A)    Final Anesthesia Type: general      Patient location during evaluation: PACU  Patient participation: Yes- Able to Participate  Level of consciousness: awake and alert  Post-procedure vital signs: reviewed and stable  Pain management: adequate  Airway patency: patent    PONV status at discharge: No PONV  Anesthetic complications: no      Cardiovascular status: stable  Respiratory status: room air  Hydration status: euvolemic  Follow-up not needed.              Vitals Value Taken Time   /75 02/19/24 1001   Temp 36.7 °C (98.1 °F) 02/19/24 0905   Pulse 77 02/19/24 1012   Resp 16 02/19/24 0950   SpO2 96 % 02/19/24 1012   Vitals shown include unvalidated device data.      No case tracking events are documented in the log.      Pain/Eduarda Score: Pain Rating Prior to Med Admin: 8 (2/19/2024  9:38 AM)  Pain Rating Post Med Admin: 3 (2/19/2024  9:38 AM)  Eduarda Score: 10 (2/19/2024  9:50 AM)

## 2024-02-19 NOTE — DISCHARGE SUMMARY
Discharge Note  Short Stay      SUMMARY     Admit Date: 2/19/2024    Attending Physician: Tomeka Lowry MD PhD    Discharge Physician: Tomeka Lowry      Discharge Date: 2/19/2024 9:03 AM    Procedure(s) (LRB):  DESTRUCTION,INTRAOSSEOUS BASIVERTEBRAL NERVE,1ST TWO BODIES,LUM/SAC (N/A), L4 and L5 Intracept Procedure    Final Diagnosis: Vertebrogenic low back pain [M54.51]    Disposition: Home or self care    Patient Instructions:   Current Discharge Medication List        CONTINUE these medications which have NOT CHANGED    Details   atorvastatin (LIPITOR) 40 MG tablet Take 40 mg by mouth every evening.      diltiaZEM (CARDIZEM LA) 180 mg 24 hr tablet Take 180 mg by mouth once daily.      HUMALOG KWIKPEN INSULIN 100 unit/mL pen Inject into the skin.      hydroCHLOROthiazide (HYDRODIURIL) 12.5 MG Tab Take 12.5 mg by mouth once daily.      insulin glargine (LANTUS) 100 unit/mL injection Inject 22 Units into the skin once daily.      irbesartan (AVAPRO) 75 MG tablet Take 75 mg by mouth every evening.      meloxicam (MOBIC) 15 MG tablet Take 15 mg by mouth.                 Discharge Diagnosis: Vertebrogenic low back pain [M54.51]  Condition on Discharge: Stable with no complications to procedure   Diet on Discharge: Same as before.  Activity: as per instruction sheet.  Discharge to: Home with a responsible adult.  Follow up: 2-4 weeks       Please call my office or pager at [phone] if experienced any weakness or loss of sensation, fever > 101.5, pain uncontrolled with oral medications, persistent nausea/vomiting/or diarrhea, redness or drainage from the incisions, or any other worrisome concerns. If physician on call was not reached or could not communicate with our office for any reason please go to the nearest emergency department      Tomeka Lowry MD PhD

## 2024-02-19 NOTE — OP NOTE
Patient Name: David Solomon  MRN: [id number]    INFORMED CONSENT: The procedure, risks, benefits and options were discussed with patient. There are no contraindications to the procedure. The patient expressed understanding and agreed to proceed. The personnel performing the procedure was discussed. I verify that I personally obtained David's consent prior to the start of the procedure and the signed consent can be found on the patient's chart.        Procedure Date: 2/19/2024  Surgeon(s) and Role:     * Tomeka Lowry MD - Primary     * Tyree Ochoa DO      Pre Procedure diagnosis: Vertebrogenic low back pain [M54.51]  1. Vertebrogenic pain syndrome    2. Chronic pain      Post-Procedure diagnosis: SAME    Sedation: See Anesthesia Record      PROCEDURE: Intracept Procedure at L4 and L5  SURGEON:DR. Tomeka Lowry MD  ASST: Tyree Ochoa DO  OPERATION:               1. Intracept procedure L4 and L5 levels              2. Radiofrequency ablation        PREOPERATIVE ANTIBIOTICS: 2 IV given 30 minutes prior to incision, see anesthesia record for time.    OPERATIVE PROCEDURE: The patient was brought to the operating room suite and general anesthesia with endotracheal intubation was performed. The patient was positioned prone on the Feliz table. The back was prepped and draped. One image intensifiers (C-arm) was brought into position and the L5 left pedicle was identified and marked with a skin marker. The C-arm, was turned oblique to visualize the pedicle and superior border of the left pedicle was marked. The site was anesthestized with half and half mix of Lidocaine 1% without epi and Bupivacaine 0.25%. A 1 cm paramedian incision was made. The osteointroducer with dee tip was placed and advanced through the pedicle with AP and lateral fluoroscopy imaging guidance. Once the needle was at the junction of the pedicle and the vertebral body, a lateral image was taken to insure that the cannula was  positioned just past the vertebral body wall. Through the cannula, a curved and straight stylet was advanced into the vertebral body under fluoroscopic guidance creating a channel. The radiopaque marker bands on the stylet were identified using AP and lateral images.   After completing the entry into the vertebral body, a radiofrequency probe was inserted through the cannula and advanced under fluoroscopic guidance. The radiopaque marker bands on the probe were identified using AP and lateral images. Then radiofrequency ablation was performed for 7 minutes. Once the ablation was completed, the curved needle and cannula was then removed.     Next, the L4  right pedicles were identified and marked with a skin marker. The C-arm, was turned oblique to visualize the pedicle and superior border of the right pedicle was marked. The site was anesthestized with half and half mix of Lidocaine 1% without epi and Bupivacaine 0.25%. A 1 cm paramedian incision was made. The osteointroducer with dee tip was placed and advanced through the pedicle with AP and lateral fluoroscopy imaging guidance. Once the needle was at the junction of the pedicle and the vertebral body, a lateral image was taken to insure that the cannula was positioned just past the vertebral body wall. Through the cannula, a curved and straight stylet was advanced into the vertebral body under fluoroscopic guidance creating a channel. The radiopaque marker bands on the stylet were identified using AP and lateral images.   After completing the entry into the vertebral body, a radiofrequency probe was inserted through the cannula and advanced under fluoroscopic guidance. The radiopaque marker bands on the probe were identified using AP and lateral images. Then radiofrequency ablation was performed for 7 minutes. Once the ablation was completed, the curved needle and cannula was then removed.    Post-procedure, all incisions were closed with bandages. PATIENT  was moving lower extremities at rewcovery and neurological exam was unchanged from pre-procedure.     COMPLICATIONS: There were no complications.     Dr. Lowry was present during the critical and key portions of the procedure.  Treatment plan was discussed with the patient. Post procedure instructions were reviewed and the patient voiced understanding.    Blood Loss: Nill  Specimen: None    Tomeka Lowry MD

## 2024-02-19 NOTE — ANESTHESIA PREPROCEDURE EVALUATION
02/19/2024  David Solomon is a 73 y.o., male.      Pre-op Assessment    I have reviewed the Patient Summary Reports.     I have reviewed the Nursing Notes. I have reviewed the NPO Status.   I have reviewed the Medications.     Review of Systems  Anesthesia Hx:  No problems with previous Anesthesia               Denies Personal Hx of Anesthesia complications.                    Social:  Non-Smoker       Cardiovascular:     Hypertension                                        Pulmonary:  Pulmonary Normal                       Renal/:  Renal/ Normal                 Hepatic/GI:  Hepatic/GI Normal                 Musculoskeletal:  Arthritis          Spine Disorders:             Neurological:  Neurology Normal                                      Endocrine:  Diabetes               Physical Exam  General: Well nourished, Cooperative, Alert and Oriented    Airway:  Mallampati: II   Mouth Opening: Normal  TM Distance: Normal  Tongue: Normal  Neck ROM: Normal ROM    Dental:  Intact        Anesthesia Plan  Type of Anesthesia, risks & benefits discussed:    Anesthesia Type: Gen Natural Airway  Intra-op Monitoring Plan: Standard ASA Monitors  Post Op Pain Control Plan: multimodal analgesia and IV/PO Opioids PRN  Induction:  IV  Informed Consent: Informed consent signed with the Patient and all parties understand the risks and agree with anesthesia plan.  All questions answered.   ASA Score: 2  Day of Surgery Review of History & Physical: H&P Update referred to the surgeon/provider.    Ready For Surgery From Anesthesia Perspective.     .

## 2024-02-19 NOTE — PLAN OF CARE
Pt in preop bay 40, VSS and IV inserted. Pt denies any open wounds on body or the use of any weight loss injections. Pt needs an updated H&P, anesthesia consents and procedural consents, and an admit order, otherwise ready to roll.

## 2024-02-20 ENCOUNTER — OFFICE VISIT (OUTPATIENT)
Dept: ORTHOPEDICS | Facility: CLINIC | Age: 74
End: 2024-02-20
Payer: MEDICARE

## 2024-02-20 VITALS — WEIGHT: 168.19 LBS | BODY MASS INDEX: 25.58 KG/M2

## 2024-02-20 DIAGNOSIS — M51.36 DDD (DEGENERATIVE DISC DISEASE), LUMBAR: ICD-10-CM

## 2024-02-20 DIAGNOSIS — M54.16 LUMBAR RADICULOPATHY: Primary | ICD-10-CM

## 2024-02-20 DIAGNOSIS — M47.816 LUMBAR SPONDYLOSIS: ICD-10-CM

## 2024-02-20 PROCEDURE — 99204 OFFICE O/P NEW MOD 45 MIN: CPT | Mod: S$GLB,,, | Performed by: ORTHOPAEDIC SURGERY

## 2024-02-20 PROCEDURE — 99999 PR PBB SHADOW E&M-EST. PATIENT-LVL III: CPT | Mod: PBBFAC,,, | Performed by: ORTHOPAEDIC SURGERY

## 2024-02-20 NOTE — PROGRESS NOTES
DATE: 2/20/2024  PATIENT: David Solomon    Attending Physician: Suresh Yeh M.D.    CHIEF COMPLAINT: LBP and RLE (R>L) pain    HISTORY:  David Solomon is a 73 y.o. male presents for initial evaluation of low back and b/l (R>L) leg pain (Back - 2, Leg - 2). The pain has been present for 1 year. The patient describes the pain as dull and it radiates posterolaterally down RLE to the calf and down LLE to the knee.  The pain is worse with activity and improved by rest. There is no associated numbness and tingling. There is no subjective weakness. Prior treatments have included meds (norco, mobic), PT, and ESIs, but no surgery. Patient is miserable and considering surgical intervention.    The Patient denies myelopathic symptoms such as handwriting changes or difficulty with buttons/coins/keys. Denies perineal paresthesias, bowel/bladder dysfunction.    The patient does not smoke or endorse IVDU. He has type I DM (HA1C of 6.2 according to pt). The patient is not on any blood thinners and does not take chronic narcotics. 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    PAST MEDICAL/SURGICAL HISTORY:  Past Medical History:   Diagnosis Date    Cataract     Diabetes mellitus     Diabetic retinopathy     Hypertension      Past Surgical History:   Procedure Laterality Date    CATARACT EXTRACTION Right 03/27/2022    Dr. Ricketts    DESTRUCTION, INTRAOSSEOUS BASIVERTEBRAL NERVE, 1ST TWO BODIES, LUM/SAC N/A 2/19/2024    Procedure: DESTRUCTION,INTRAOSSEOUS BASIVERTEBRAL NERVE,1ST TWO BODIES,LUM/SAC;  Surgeon: Tomeka Lowry MD;  Location: [facility] OR;  Service: Pain Management;  Laterality: N/A;    EPIDURAL STEROID INJECTION INTO LUMBAR SPINE N/A 12/1/2023    Procedure: L5-S1 LESI;  Surgeon: Tomeka Lowry MD;  Location: [facility] PAIN MANAGEMENT;  Service: Pain Management;  Laterality: N/A;  oral 20 mins    EPIDURAL STEROID INJECTION INTO LUMBAR SPINE Bilateral 1/2/2024    Procedure: B/L L5-S1 TFESI;  Surgeon:  Kelvin OchoaonDO;  Location: [facility] PAIN MANAGEMENT;  Service: Pain Management;  Laterality: Bilateral;  20 mins    INTRAOCULAR PROSTHESES INSERTION Right 3/27/2022    Procedure: INSERTION, IOL PROSTHESIS;  Surgeon: Divine Ricketts MD;  Location: [facility] OR [address] [place] [address];  Service: Ophthalmology;  Laterality: Right;    INTRAOCULAR PROSTHESES INSERTION Left 5/15/2022    Procedure: INSERTION, IOL PROSTHESIS;  Surgeon: Divine Ricketts MD;  Location: [facility] OR [address] [place] [address];  Service: Ophthalmology;  Laterality: Left;    PHACOEMULSIFICATION OF CATARACT Right 3/27/2022    Procedure: PHACOEMULSIFICATION, CATARACT;  Surgeon: Divine Ricketts MD;  Location: [facility] OR [address] [place] [address];  Service: Ophthalmology;  Laterality: Right;    PHACOEMULSIFICATION OF CATARACT Left 5/15/2022    Procedure: PHACOEMULSIFICATION, CATARACT;  Surgeon: Divine Ricketts MD;  Location: [facility] OR [address] [place] [address];  Service: Ophthalmology;  Laterality: Left;       Current Medications:   Current Outpatient Medications:     atorvastatin (LIPITOR) 40 MG tablet, Take 40 mg by mouth every evening., Disp: , Rfl:     diltiaZEM (CARDIZEM LA) 180 mg 24 hr tablet, Take 180 mg by mouth once daily., Disp: , Rfl:     HUMALOG KWIKPEN INSULIN 100 unit/mL pen, Inject into the skin., Disp: , Rfl:     hydroCHLOROthiazide (HYDRODIURIL) 12.5 MG Tab, Take 12.5 mg by mouth once daily., Disp: , Rfl:     HYDROcodone-acetaminophen (NORCO) 5-325 mg per tablet, Take 1 tablet by mouth every 8 (eight) hours as needed for Pain., Disp: 21 tablet, Rfl: 0    insulin glargine (LANTUS) 100 unit/mL injection, Inject 22 Units into the skin once daily., Disp: , Rfl:     irbesartan (AVAPRO) 75 MG tablet, Take 75 mg by mouth every evening., Disp: , Rfl:     meloxicam (MOBIC) 15 MG tablet, Take 15 mg by mouth., Disp: , Rfl:   No current facility-administered medications for this visit.    Facility-Administered Medications Ordered in Other Visits:     phenylephrine HCL 2.5% ophthalmic solution 1 drop, 1 drop,  Right Eye, On Call Procedure, Divine Ricketts MD, 1 drop at 03/27/22 0813    phenylephrine HCL 2.5% ophthalmic solution 1 drop, 1 drop, Left Eye, On Call Procedure, Divine Ricketts MD, 1 drop at 05/15/22 0948    proparacaine 0.5 % ophthalmic solution 1 drop, 1 drop, Right Eye, On Call Procedure, Divine Ricketts MD, 1 drop at 03/27/22 0802    proparacaine 0.5 % ophthalmic solution 1 drop, 1 drop, Left Eye, On Call Procedure, Divine Ricketts MD, 1 drop at 05/15/22 0936    tropicamide 1% ophthalmic solution 1 drop, 1 drop, Right Eye, On Call Procedure, Divine Ricketts MD, 1 drop at 03/27/22 0813    tropicamide 1% ophthalmic solution 1 drop, 1 drop, Left Eye, On Call Procedure, Divine Ricketts MD, 1 drop at 05/15/22 0947    Social History:   Social History     Socioeconomic History    Marital status:    Tobacco Use    Smoking status: Never    Smokeless tobacco: Never   Substance and Sexual Activity    Alcohol use: No    Drug use: No    Sexual activity: Not Currently       REVIEW OF SYSTEMS:  Constitution: Negative. Negative for chills, fever and night sweats.   Cardiovascular: Negative for chest pain and syncope.   Respiratory: Negative for cough and shortness of breath.   Gastrointestinal: See HPI. Negative for nausea/vomiting. Negative for abdominal pain.  Genitourinary: See HPI. Negative for discoloration or dysuria.  Hematologic/Lymphatic: negative for bleeding/clotting disorders.   Musculoskeletal: Negative for falls and muscle weakness.   Neurological: See HPI. no history of seizures. no history of cranial surgery or shunts.  Neurological: See HPI. No seizures.   Endocrine: Negative for polydipsia, polyphagia and polyuria.   Allergic/Immunologic: Negative for hives and persistent infections.     EXAM:  Wt 76.3 kg (168 lb 3.4 oz)   BMI 25.58 kg/m²     PHYSICAL EXAMINATION:    General: The patient is a 73 y.o. male in no apparent distress, the patient is orientatied to person, place and  "time.  Psych: Normal mood and affect  HEENT: Vision grossly intact, hearing intact to the spoken word.  Lungs: Respirations unlabored.  Gait: Normal station and gait, no difficulty with toe or heel walk.   Skin: Dorsal lumbar skin negative for rashes, lesions, hairy patches and surgical scars. There is lower lumbar tenderness to palpation.  Range of motion: Lumbar range of motion is acceptable.  Spinal Balance: Global saggital and coronal spinal balance acceptable, no significant for scoliosis and kyphosis.  Musculoskeletal: No pain with the range of motion of the bilateral hips. No trochanteric tenderness to palpation.  Vascular: Bilateral lower extremities warm and well perfused, Dorsalis pedis pulses 2+ bilaterally.  Neurological: Normal strength and tone in all major motor groups in the bilateral lower extremities. Normal sensation to light touch in the L2-S1 dermatomes bilaterally.  Deep tendon reflexes symmetric 2+ in the bilateral lower extremities.  Negative Babinski bilaterally. Straight leg raise negative bilaterally.    IMAGING:   Today I independently reviewed the following images and my interpretations are as follows:    AP, Lat and Flex/Ex  upright L-spine demonstrate spondylosis and DDD without listhesis.    MRI lumbar showed L4-5 severe central and b/l foraminal stenosis.      Body mass index is 25.58 kg/m².  No results found for: "HGBA1C"    ASSESSMENT/PLAN:    David was seen today for pain.    Diagnoses and all orders for this visit:    Lumbar radiculopathy    DDD (degenerative disc disease), lumbar  -     Ambulatory referral/consult to Orthopedics    Lumbar spondylosis      Follow up in about 3 months (around 5/20/2024).    Patient has lumbar spondylosis and stenosis with BLE (R>L) radiculopathy. I discussed the natural history of their diagnoses as well as surgical and nonsurgical treatment options. I educated the patient on the importance of core/back strengthening, correct posture, " bending/lifting ergonomics, and low-impact aerobic exercises (walking, elliptical, and aquatherapy). Continue medications. Patient has failed conservative management and is a candidate for L4-5 PLDF/TLIF (R). He will think about it and let us know. Patient will follow up in 3 months for re-evaluation.    Suresh Yeh MD  Orthopaedic Spine Surgeon  Department of Orthopaedic Surgery  [phone]

## 2024-02-26 ENCOUNTER — PATIENT MESSAGE (OUTPATIENT)
Dept: PAIN MEDICINE | Facility: CLINIC | Age: 74
End: 2024-02-26
Payer: MEDICARE

## 2024-03-05 ENCOUNTER — OFFICE VISIT (OUTPATIENT)
Dept: PAIN MEDICINE | Facility: CLINIC | Age: 74
End: 2024-03-05
Payer: MEDICARE

## 2024-03-05 ENCOUNTER — TELEPHONE (OUTPATIENT)
Dept: PAIN MEDICINE | Facility: CLINIC | Age: 74
End: 2024-03-05

## 2024-03-05 VITALS
SYSTOLIC BLOOD PRESSURE: 171 MMHG | DIASTOLIC BLOOD PRESSURE: 69 MMHG | HEIGHT: 68 IN | HEART RATE: 80 BPM | WEIGHT: 168.19 LBS | BODY MASS INDEX: 25.49 KG/M2

## 2024-03-05 DIAGNOSIS — M54.16 LUMBAR RADICULOPATHY: Primary | ICD-10-CM

## 2024-03-05 DIAGNOSIS — M54.16 LUMBAR RADICULOPATHY: ICD-10-CM

## 2024-03-05 DIAGNOSIS — M51.36 DDD (DEGENERATIVE DISC DISEASE), LUMBAR: Primary | ICD-10-CM

## 2024-03-05 PROCEDURE — 99999 PR PBB SHADOW E&M-EST. PATIENT-LVL III: CPT | Mod: PBBFAC,,, | Performed by: STUDENT IN AN ORGANIZED HEALTH CARE EDUCATION/TRAINING PROGRAM

## 2024-03-05 PROCEDURE — 99214 OFFICE O/P EST MOD 30 MIN: CPT | Mod: S$GLB,,, | Performed by: STUDENT IN AN ORGANIZED HEALTH CARE EDUCATION/TRAINING PROGRAM

## 2024-03-05 RX ORDER — PREGABALIN 50 MG/1
50 CAPSULE ORAL 2 TIMES DAILY
Qty: 90 CAPSULE | Refills: 2 | Status: SHIPPED | OUTPATIENT
Start: 2024-03-05

## 2024-03-05 NOTE — TELEPHONE ENCOUNTER
----- Message from Kanika Mirza MD sent at 3/5/2024  8:56 AM CST -----  Regarding: Order for SCOTTIE WILSON    Patient Name: SCOTTIE WILSON([id number])  Sex: Male  : 1950      PCP: DOMINIC, PRIMARY DOCTOR    Center: [facility] ([facility]     Types of orders made on 2024: Procedure Request    Order Date:3/5/2024  Ordering User:KANIKA MIRZA [[id number]]  Encounter Provider:Kanika Mirza MD [[id number]]  Authorizing Provider: Kanika Fung MD [[id number]]  Department:[facility][[id number]]    Common Order Information  Procedure -> Transforaminal Injection (Specify level and laterality) Cmt:             Bilateral L4/5 TFESI    Order Specific Information  Order: Procedure Order to Pain Management [Custom: [id number]]  Order #:          [id number]: 1 FUTURE    Priority: Routine  Class: Clinic Performed    Future Order Information        Expires on:2025            Expected by:2024                   Associated Diagnoses      M51.36 DDD (degenerative disc disease), lumbar      M54.16 Lumbar radiculopathy      Physician -> YUKI         Is patient on anti-coagulants? -> No         Facility Name: -> [facility]           Priority: Routine  Class: Clinic Performed    Future Order Information      Expires on:2025          Z1     Expected by:2024                   Associated Diagnoses      M51.36 DDD (degenerative disc disease), lumbar      M54.16 Lumbar radiculopathy      Procedure -> Transforaminal Injection (Specify level and laterality) Cmt:                 Bilateral L4/5 TFESI        Physician -> YUKI         Is patient on anti-coagulants? -> No         Facility Name: -> [facility]

## 2024-03-05 NOTE — PROGRESS NOTES
Chronic Pain - Established Clinic Visit    Referring Physician: No ref. provider found    Chief Complaint:   Chief Complaint   Patient presents with    Low-back Pain    Leg Pain        SUBJECTIVE:  INTERVAL HISTORY 3/5/2024:  Mr. Solomon returns for bilateral leg pain. Current Pain level is 8/10.  He is s/p L4 and L5 intracept procedure. He feels his lower back pain has improved considerably, however his bilateral leg pain has worsened. Since his last visit, he has seen Dr. Yeh (orthopedic surgery) and was suggested to consider L4/5 PLIF.  Mr. Solomon's primary complaint at this time is his radiating bilateral lower extremity pain.    INTERVAL HISTORY 1/23/2023:  Mr. Solomon returns for chronic lower back pain and bilateral leg pain.  He is s/p bilateral L5/S1 transforaminal epidural steroid injection. He reports that the pain that was radiating down his legs has improved for his bilateral leg pain.  He reports the bilateral leg pain and tingling is 4-5/10.  Prior to the injection, the pain was 10/10. Now, he feels the pain is primarily in the back.  He feels the pain is worst when he is getting out of bed.  He still feels some numbness to the right leg.  He reports his pain gets worse when he moves around his legs.  He reports today his pain level is 0/10 as he took ibuprofen and tylenol prior to coming in to clinic.    INTERVAL HISTORY 12/13/2023:  Mr. Solomon presents for lower back and bilateral leg pain. Current Pain level is 5/10.  He is s/p L5/S1 interlaminar epidural steroid injection with no significant improvement in his pain. He reports that he initially had minimal relief of his left leg, however the pain returned after the weekend. He reports that his pain is 10/10 in the morning and feels better with flexion and taking Ibuprofen.  Coughing/sneezing makes his pain worse.  He continues to try home exercises as tolerated with limited relief in his pain.  He brought in his MRI report and disc from his  outside facility which again demonstrates multilevel disc degenerative changes with multilevel foraminal stenosis.     Interval History 11/16/2023:  73-year-old male presents today for a follow-up appointment to discuss his lumbar MRI and be considered for pain interventions.  His pain continues to complain of  low back with radiating symptoms into his legs posteriorly slightly pass his knees bilaterally.  He reports pulling and tightening symptoms in his legs bilaterally walking, standing and repositioning his body increases his pain.   He denies any profound weakness denies any recent incident or trauma denies any bowel bladder dysfunction, denies any saddle anesthesia at this time.    David Solomon presents to the clinic for the evaluation of lower back pain. The pain started 6 weeks ago following lawn work and symptoms have been worsening.The pain is located in the lower back area and radiates to the hamstrings and down to his calves.  The pain is described as stabbing and is rated as 7/10. The pain is rated with a score of  0/10 on the BEST day and a score of 9/10 on the WORST day.  Symptoms interfere with daily activity. The pain is exacerbated by certain positions, walking, worse in the morning (getting out of bed).  The pain is mitigated by medications (ibuprofen and steroid (one dose).  The patient reports 6 hours of uninterrupted sleep per night.    He has tried biofreeze without relief.  He was given a medrol dose pack, but his blood glucose went extremely high, so he stopped it. He also had a steroid injection at an ED which also severely increased his blood sugar.  He sees his endocrinologist for over 20 years and is currently on a long acting and short acting regimen.  He checks his sugars 10 times a day.    He was seen by Dr. Gonsalves on 5/25/2020 for knee pain and was given some knee rehab exercises.  He continues to participate in [employer].    Patient denies urinary incontinence, bowel  incontinence, and significant motor weakness.    Physical Therapy/Home Exercise: no      Pain Disability Index Review:      3/5/2024     8:30 AM 1/23/2024     3:18 PM 11/16/2023     8:33 AM   Last 3 PDI Scores   Pain Disability Index (PDI) 70 56 52       Pain Medications:  Norco 7.5-325mg  Ibuprofen 800mg twice a day     report:  Reviewed and consistent with medication use as prescribed.    Pain Procedures:   None    Imaging:   Outside Lumbar MRI: multilevel degenerative disease with central and foraminal stenosis at L4/5 and L5/S1    Past Medical History:   Diagnosis Date    Cataract     Diabetes mellitus     Diabetic retinopathy     Hypertension      Past Surgical History:   Procedure Laterality Date    CATARACT EXTRACTION Right 03/27/2022    Dr. Ricketts    DESTRUCTION, INTRAOSSEOUS BASIVERTEBRAL NERVE, 1ST TWO BODIES, LUM/SAC N/A 2/19/2024    Procedure: DESTRUCTION,INTRAOSSEOUS BASIVERTEBRAL NERVE,1ST TWO BODIES,LUM/SAC;  Surgeon: Tomeka Lowry MD;  Location: [facility] OR;  Service: Pain Management;  Laterality: N/A;    EPIDURAL STEROID INJECTION INTO LUMBAR SPINE N/A 12/1/2023    Procedure: L5-S1 LESI;  Surgeon: Tomeka Lowry MD;  Location: [facility] PAIN MANAGEMENT;  Service: Pain Management;  Laterality: N/A;  oral 20 mins    EPIDURAL STEROID INJECTION INTO LUMBAR SPINE Bilateral 1/2/2024    Procedure: B/L L5-S1 TFESI;  Surgeon: Tyree Ochoa DO;  Location: [facility] PAIN MANAGEMENT;  Service: Pain Management;  Laterality: Bilateral;  20 mins    INTRAOCULAR PROSTHESES INSERTION Right 3/27/2022    Procedure: INSERTION, IOL PROSTHESIS;  Surgeon: Divine Ricketts MD;  Location: [facility] OR [address] [place] [address];  Service: Ophthalmology;  Laterality: Right;    INTRAOCULAR PROSTHESES INSERTION Left 5/15/2022    Procedure: INSERTION, IOL PROSTHESIS;  Surgeon: Divine Ricketts MD;  Location: [facility] OR [address] [place] [address];  Service: Ophthalmology;  Laterality: Left;    PHACOEMULSIFICATION OF CATARACT Right 3/27/2022    Procedure:  PHACOEMULSIFICATION, CATARACT;  Surgeon: Divine Ricketts MD;  Location: [address];  Service: Ophthalmology;  Laterality: Right;    PHACOEMULSIFICATION OF CATARACT Left 5/15/2022    Procedure: PHACOEMULSIFICATION, CATARACT;  Surgeon: Divine Ricketts MD;  Location: [address];  Service: Ophthalmology;  Laterality: Left;     Social History     Socioeconomic History    Marital status:    Tobacco Use    Smoking status: Never    Smokeless tobacco: Never   Substance and Sexual Activity    Alcohol use: No    Drug use: No    Sexual activity: Not Currently     Family History   Problem Relation Age of Onset    No Known Problems Mother     No Known Problems Father     Melanoma Neg Hx        Review of patient's allergies indicates:  No Known Allergies    Current Outpatient Medications   Medication Sig    atorvastatin (LIPITOR) 40 MG tablet Take 40 mg by mouth every evening.    diltiaZEM (CARDIZEM LA) 180 mg 24 hr tablet Take 180 mg by mouth once daily.    HUMALOG KWIKPEN INSULIN 100 unit/mL pen Inject into the skin.    hydroCHLOROthiazide (HYDRODIURIL) 12.5 MG Tab Take 12.5 mg by mouth once daily.    HYDROcodone-acetaminophen (NORCO) 5-325 mg per tablet Take 1 tablet by mouth every 8 (eight) hours as needed for Pain.    insulin glargine (LANTUS) 100 unit/mL injection Inject 22 Units into the skin once daily.    irbesartan (AVAPRO) 75 MG tablet Take 75 mg by mouth every evening.    meloxicam (MOBIC) 15 MG tablet Take 15 mg by mouth.     No current facility-administered medications for this visit.     Facility-Administered Medications Ordered in Other Visits   Medication    phenylephrine HCL 2.5% ophthalmic solution 1 drop    phenylephrine HCL 2.5% ophthalmic solution 1 drop    proparacaine 0.5 % ophthalmic solution 1 drop    proparacaine 0.5 % ophthalmic solution 1 drop    tropicamide 1% ophthalmic solution 1 drop    tropicamide 1% ophthalmic solution 1 drop       REVIEW OF SYSTEMS:    GENERAL:  No weight  "loss, malaise or fevers.  HEENT:  Negative for frequent or significant headaches.  NECK:  Negative for lumps, goiter, pain and significant neck swelling.  RESPIRATORY:  Negative for cough, wheezing or shortness of breath.  CARDIOVASCULAR:  Negative for chest pain, leg swelling or palpitations.  GI:  Negative for abdominal discomfort, blood in stools or black stools or change in bowel habits.  MUSCULOSKELETAL:  See HPI.  SKIN:  Negative for lesions, rash, and itching.  PSYCH:  Negative for sleep disturbance, mood disorder and recent psychosocial stressors.  HEMATOLOGY/LYMPHOLOGY:  Negative for prolonged bleeding, bruising easily or swollen nodes. +DM type 1  NEURO:   No history of headaches, syncope, paralysis, seizures or tremors.  All other reviewed and negative other than HPI.    OBJECTIVE:    BP (!) 171/69 (BP Location: Left arm, Patient Position: Sitting)   Pulse 80   Ht 5' 8" (1.727 m)   Wt 76.3 kg (168 lb 3.4 oz)   BMI 25.58 kg/m²     PHYSICAL EXAMINATION:  General appearance: Well appearing, in no acute distress, alert and appropriately communicative.  Psych:  Mood and affect appropriate.  Skin: Skin color, texture, turgor normal, no rashes or lesions, in both upper and lower body.  Head/face:  Atraumatic, normocephalic.  Cor: regular rate  Pulm: non-labored breathing  GI: Abdomen non-distended and non-tender.  Back: Straight leg raising in the sitting and supine positions is positive to radicular pain left > right. No pain to palpation over the spine or paraspinal muscles. -Milgrams.  Extremities: Peripheral joint ROM is full and pain free without obvious instability or laxity in all four extremities. No deformities, edema, or skin discoloration. Good capillary refill.  Musculoskeletal:  hip, sacroiliac and knee provocative maneuvers are negative. Bilateral upper and lower extremity strength is normal and symmetric.  No atrophy or tone abnormalities are noted.  Neuro: Bilateral upper and lower extremity " coordination and muscle stretch reflexes are physiologic and symmetric.  Negative Clonus. No loss of sensation is noted.  Gait: Normal.    Lab Results   Component Value Date    CREATININE 1.16 03/17/2017     CMP  Sodium   Date Value Ref Range Status   03/17/2017 145 136 - 145 mmol/L Final     Potassium   Date Value Ref Range Status   03/17/2017 3.6 3.5 - 5.1 mmol/L Final     Chloride   Date Value Ref Range Status   03/17/2017 103 95 - 110 mmol/L Final     CO2   Date Value Ref Range Status   03/17/2017 30 (H) 23 - 29 mmol/L Final     Glucose   Date Value Ref Range Status   03/17/2017 99 70 - 110 mg/dL Final     BUN   Date Value Ref Range Status   03/17/2017 11 2 - 20 mg/dL Final     Creatinine   Date Value Ref Range Status   03/17/2017 1.16 0.50 - 1.40 mg/dL Final     Calcium   Date Value Ref Range Status   03/17/2017 9.5 8.7 - 10.5 mg/dL Final     Total Protein   Date Value Ref Range Status   03/17/2017 7.6 6.0 - 8.4 g/dL Final     Albumin   Date Value Ref Range Status   03/17/2017 4.4 3.5 - 5.2 g/dL Final     Total Bilirubin   Date Value Ref Range Status   03/17/2017 0.8 0.1 - 1.0 mg/dL Final     Comment:     For infants and newborns, interpretation of results should be based  on gestational age, weight and in agreement with clinical  observations.  Premature Infant recommended reference ranges:  Up to 24 hours.............<8.0 mg/dL  Up to 48 hours............<12.0 mg/dL  3-5 days..................<15.0 mg/dL  6-29 days.................<15.0 mg/dL       Alkaline Phosphatase   Date Value Ref Range Status   03/17/2017 82 38 - 126 U/L Final     AST   Date Value Ref Range Status   03/17/2017 30 15 - 46 U/L Final     ALT   Date Value Ref Range Status   03/17/2017 31 10 - 44 U/L Final     Anion Gap   Date Value Ref Range Status   03/17/2017 12 8 - 16 mmol/L Final         ASSESSMENT: 73 y.o. year old male with lower back pain, consistent with:     No diagnosis found.    IMPRESSION: Mr. Solomon returns with lower back and  bilateral lower extremity pain.  He is s/p L5/S1 interlaminar epidural injection with no improvement in his pain. History and physical exam are consistent with lumbar radiculopathy, specifically following the L5 distribution bilaterally. Imaging is consistent with lumbar degenerative disc disease with central and foraminal stenosis at L4/5 and L5/S1.  He has failed multiple interventions in the past, including interlaminar epidural and L5/S1 bilateral epidural injection.  He has had the intracept procedure for vertebrogenic back pain with some benefit, however his lumbar radiculopathy seems to be worst at this point.  I advised that the next logical step is consideration for surgery.  In the mean time, we can attempt an additional transforaminal epidural at L4/5 (area of worst stenosis) to see if we can give him any additional relief.    PLAN:   - I have stressed the importance of physical activity and a home exercise plan to help with pain and improve health.  - he will call to schedule his surgery with Dr. Yeh.  - start pregabalin 50mg BID; I advised to start with nightly for 1 week and increase to twice daily thereafter  - schedule for bilateral L4/5 transforaminal epidural steroid injection  - continue home exercise program as he has been doing so far  - counseled on not combining NSAIDs.  He can alternatively take tylenol along with NSAIDs if necessary.  - follow up after epidural injection    The above plan and management options were discussed at length with patient. Patient is in agreement with the above and verbalized understanding. It will be communicated with the referring physician via electronic record, fax, or mail.    Tomeka Lowry MD  Interventional Pain Management    03/05/2024

## 2024-03-05 NOTE — H&P (VIEW-ONLY)
Chronic Pain - Established Clinic Visit    Referring Physician: No ref. provider found    Chief Complaint:   Chief Complaint   Patient presents with    Low-back Pain    Leg Pain        SUBJECTIVE:  INTERVAL HISTORY 3/5/2024:  Mr. Solomon returns for bilateral leg pain. Current Pain level is 8/10.  He is s/p L4 and L5 intracept procedure. He feels his lower back pain has improved considerably, however his bilateral leg pain has worsened. Since his last visit, he has seen Dr. Yeh (orthopedic surgery) and was suggested to consider L4/5 PLIF.  Mr. Solomon's primary complaint at this time is his radiating bilateral lower extremity pain.    INTERVAL HISTORY 1/23/2023:  Mr. Solomon returns for chronic lower back pain and bilateral leg pain.  He is s/p bilateral L5/S1 transforaminal epidural steroid injection. He reports that the pain that was radiating down his legs has improved for his bilateral leg pain.  He reports the bilateral leg pain and tingling is 4-5/10.  Prior to the injection, the pain was 10/10. Now, he feels the pain is primarily in the back.  He feels the pain is worst when he is getting out of bed.  He still feels some numbness to the right leg.  He reports his pain gets worse when he moves around his legs.  He reports today his pain level is 0/10 as he took ibuprofen and tylenol prior to coming in to clinic.    INTERVAL HISTORY 12/13/2023:  Mr. Solomon presents for lower back and bilateral leg pain. Current Pain level is 5/10.  He is s/p L5/S1 interlaminar epidural steroid injection with no significant improvement in his pain. He reports that he initially had minimal relief of his left leg, however the pain returned after the weekend. He reports that his pain is 10/10 in the morning and feels better with flexion and taking Ibuprofen.  Coughing/sneezing makes his pain worse.  He continues to try home exercises as tolerated with limited relief in his pain.  He brought in his MRI report and disc from his  outside facility which again demonstrates multilevel disc degenerative changes with multilevel foraminal stenosis.     Interval History 11/16/2023:  73-year-old male presents today for a follow-up appointment to discuss his lumbar MRI and be considered for pain interventions.  His pain continues to complain of  low back with radiating symptoms into his legs posteriorly slightly pass his knees bilaterally.  He reports pulling and tightening symptoms in his legs bilaterally walking, standing and repositioning his body increases his pain.   He denies any profound weakness denies any recent incident or trauma denies any bowel bladder dysfunction, denies any saddle anesthesia at this time.    David Solomon presents to the clinic for the evaluation of lower back pain. The pain started 6 weeks ago following lawn work and symptoms have been worsening.The pain is located in the lower back area and radiates to the hamstrings and down to his calves.  The pain is described as stabbing and is rated as 7/10. The pain is rated with a score of  0/10 on the BEST day and a score of 9/10 on the WORST day.  Symptoms interfere with daily activity. The pain is exacerbated by certain positions, walking, worse in the morning (getting out of bed).  The pain is mitigated by medications (ibuprofen and steroid (one dose).  The patient reports 6 hours of uninterrupted sleep per night.    He has tried biofreeze without relief.  He was given a medrol dose pack, but his blood glucose went extremely high, so he stopped it. He also had a steroid injection at an ED which also severely increased his blood sugar.  He sees his endocrinologist for over 20 years and is currently on a long acting and short acting regimen.  He checks his sugars 10 times a day.    He was seen by Dr. Gonsalves on 5/25/2020 for knee pain and was given some knee rehab exercises.  He continues to participate in [employer].    Patient denies urinary incontinence, bowel  incontinence, and significant motor weakness.    Physical Therapy/Home Exercise: no      Pain Disability Index Review:      3/5/2024     8:30 AM 1/23/2024     3:18 PM 11/16/2023     8:33 AM   Last 3 PDI Scores   Pain Disability Index (PDI) 70 56 52       Pain Medications:  Norco 7.5-325mg  Ibuprofen 800mg twice a day     report:  Reviewed and consistent with medication use as prescribed.    Pain Procedures:   None    Imaging:   Outside Lumbar MRI: multilevel degenerative disease with central and foraminal stenosis at L4/5 and L5/S1    Past Medical History:   Diagnosis Date    Cataract     Diabetes mellitus     Diabetic retinopathy     Hypertension      Past Surgical History:   Procedure Laterality Date    CATARACT EXTRACTION Right 03/27/2022    Dr. Ricketts    DESTRUCTION, INTRAOSSEOUS BASIVERTEBRAL NERVE, 1ST TWO BODIES, LUM/SAC N/A 2/19/2024    Procedure: DESTRUCTION,INTRAOSSEOUS BASIVERTEBRAL NERVE,1ST TWO BODIES,LUM/SAC;  Surgeon: Tomeka Lowry MD;  Location: [facility] OR;  Service: Pain Management;  Laterality: N/A;    EPIDURAL STEROID INJECTION INTO LUMBAR SPINE N/A 12/1/2023    Procedure: L5-S1 LESI;  Surgeon: Tomeka Lowry MD;  Location: [facility] PAIN MANAGEMENT;  Service: Pain Management;  Laterality: N/A;  oral 20 mins    EPIDURAL STEROID INJECTION INTO LUMBAR SPINE Bilateral 1/2/2024    Procedure: B/L L5-S1 TFESI;  Surgeon: Tyree Ochoa DO;  Location: [facility]IN [facility];  Service: Pain Management;  Laterality: Bilateral;  20 mins    INTRAOCULAR PROSTHESES INSERTION Right 3/27/2022    Procedure: INSERTION, IOL PROSTHESIS;  Surgeon: Divine Ricketts MD;  Location: [facility] OR [address] [place] [address];  Service: Ophthalmology;  Laterality: Right;    INTRAOCULAR PROSTHESES INSERTION Left 5/15/2022    Procedure: INSERTION, IOL PROSTHESIS;  Surgeon: iDvine Ricketts MD;  Location: [facility] OR [address] [place] [address];  Service: Ophthalmology;  Laterality: Left;    PHACOEMULSIFICATION OF CATARACT Right 3/27/2022    Procedure:  PHACOEMULSIFICATION, CATARACT;  Surgeon: Divine Ricketts MD;  Location: [address];  Service: Ophthalmology;  Laterality: Right;    PHACOEMULSIFICATION OF CATARACT Left 5/15/2022    Procedure: PHACOEMULSIFICATION, CATARACT;  Surgeon: Divine Ricketts MD;  Location: [address];  Service: Ophthalmology;  Laterality: Left;     Social History     Socioeconomic History    Marital status:    Tobacco Use    Smoking status: Never    Smokeless tobacco: Never   Substance and Sexual Activity    Alcohol use: No    Drug use: No    Sexual activity: Not Currently     Family History   Problem Relation Age of Onset    No Known Problems Mother     No Known Problems Father     Melanoma Neg Hx        Review of patient's allergies indicates:  No Known Allergies    Current Outpatient Medications   Medication Sig    atorvastatin (LIPITOR) 40 MG tablet Take 40 mg by mouth every evening.    diltiaZEM (CARDIZEM LA) 180 mg 24 hr tablet Take 180 mg by mouth once daily.    HUMALOG KWIKPEN INSULIN 100 unit/mL pen Inject into the skin.    hydroCHLOROthiazide (HYDRODIURIL) 12.5 MG Tab Take 12.5 mg by mouth once daily.    HYDROcodone-acetaminophen (NORCO) 5-325 mg per tablet Take 1 tablet by mouth every 8 (eight) hours as needed for Pain.    insulin glargine (LANTUS) 100 unit/mL injection Inject 22 Units into the skin once daily.    irbesartan (AVAPRO) 75 MG tablet Take 75 mg by mouth every evening.    meloxicam (MOBIC) 15 MG tablet Take 15 mg by mouth.     No current facility-administered medications for this visit.     Facility-Administered Medications Ordered in Other Visits   Medication    phenylephrine HCL 2.5% ophthalmic solution 1 drop    phenylephrine HCL 2.5% ophthalmic solution 1 drop    proparacaine 0.5 % ophthalmic solution 1 drop    proparacaine 0.5 % ophthalmic solution 1 drop    tropicamide 1% ophthalmic solution 1 drop    tropicamide 1% ophthalmic solution 1 drop       REVIEW OF SYSTEMS:    GENERAL:  No weight  "loss, malaise or fevers.  HEENT:  Negative for frequent or significant headaches.  NECK:  Negative for lumps, goiter, pain and significant neck swelling.  RESPIRATORY:  Negative for cough, wheezing or shortness of breath.  CARDIOVASCULAR:  Negative for chest pain, leg swelling or palpitations.  GI:  Negative for abdominal discomfort, blood in stools or black stools or change in bowel habits.  MUSCULOSKELETAL:  See HPI.  SKIN:  Negative for lesions, rash, and itching.  PSYCH:  Negative for sleep disturbance, mood disorder and recent psychosocial stressors.  HEMATOLOGY/LYMPHOLOGY:  Negative for prolonged bleeding, bruising easily or swollen nodes. +DM type 1  NEURO:   No history of headaches, syncope, paralysis, seizures or tremors.  All other reviewed and negative other than HPI.    OBJECTIVE:    BP (!) 171/69 (BP Location: Left arm, Patient Position: Sitting)   Pulse 80   Ht 5' 8" (1.727 m)   Wt 76.3 kg (168 lb 3.4 oz)   BMI 25.58 kg/m²     PHYSICAL EXAMINATION:  General appearance: Well appearing, in no acute distress, alert and appropriately communicative.  Psych:  Mood and affect appropriate.  Skin: Skin color, texture, turgor normal, no rashes or lesions, in both upper and lower body.  Head/face:  Atraumatic, normocephalic.  Cor: regular rate  Pulm: non-labored breathing  GI: Abdomen non-distended and non-tender.  Back: Straight leg raising in the sitting and supine positions is positive to radicular pain left > right. No pain to palpation over the spine or paraspinal muscles. -Milgrams.  Extremities: Peripheral joint ROM is full and pain free without obvious instability or laxity in all four extremities. No deformities, edema, or skin discoloration. Good capillary refill.  Musculoskeletal:  hip, sacroiliac and knee provocative maneuvers are negative. Bilateral upper and lower extremity strength is normal and symmetric.  No atrophy or tone abnormalities are noted.  Neuro: Bilateral upper and lower extremity " coordination and muscle stretch reflexes are physiologic and symmetric.  Negative Clonus. No loss of sensation is noted.  Gait: Normal.    Lab Results   Component Value Date    CREATININE 1.16 03/17/2017     CMP  Sodium   Date Value Ref Range Status   03/17/2017 145 136 - 145 mmol/L Final     Potassium   Date Value Ref Range Status   03/17/2017 3.6 3.5 - 5.1 mmol/L Final     Chloride   Date Value Ref Range Status   03/17/2017 103 95 - 110 mmol/L Final     CO2   Date Value Ref Range Status   03/17/2017 30 (H) 23 - 29 mmol/L Final     Glucose   Date Value Ref Range Status   03/17/2017 99 70 - 110 mg/dL Final     BUN   Date Value Ref Range Status   03/17/2017 11 2 - 20 mg/dL Final     Creatinine   Date Value Ref Range Status   03/17/2017 1.16 0.50 - 1.40 mg/dL Final     Calcium   Date Value Ref Range Status   03/17/2017 9.5 8.7 - 10.5 mg/dL Final     Total Protein   Date Value Ref Range Status   03/17/2017 7.6 6.0 - 8.4 g/dL Final     Albumin   Date Value Ref Range Status   03/17/2017 4.4 3.5 - 5.2 g/dL Final     Total Bilirubin   Date Value Ref Range Status   03/17/2017 0.8 0.1 - 1.0 mg/dL Final     Comment:     For infants and newborns, interpretation of results should be based  on gestational age, weight and in agreement with clinical  observations.  Premature Infant recommended reference ranges:  Up to 24 hours.............<8.0 mg/dL  Up to 48 hours............<12.0 mg/dL  3-5 days..................<15.0 mg/dL  6-29 days.................<15.0 mg/dL       Alkaline Phosphatase   Date Value Ref Range Status   03/17/2017 82 38 - 126 U/L Final     AST   Date Value Ref Range Status   03/17/2017 30 15 - 46 U/L Final     ALT   Date Value Ref Range Status   03/17/2017 31 10 - 44 U/L Final     Anion Gap   Date Value Ref Range Status   03/17/2017 12 8 - 16 mmol/L Final         ASSESSMENT: 73 y.o. year old male with lower back pain, consistent with:     No diagnosis found.    IMPRESSION: Mr. Solomon returns with lower back and  bilateral lower extremity pain.  He is s/p L5/S1 interlaminar epidural injection with no improvement in his pain. History and physical exam are consistent with lumbar radiculopathy, specifically following the L5 distribution bilaterally. Imaging is consistent with lumbar degenerative disc disease with central and foraminal stenosis at L4/5 and L5/S1.  He has failed multiple interventions in the past, including interlaminar epidural and L5/S1 bilateral epidural injection.  He has had the intracept procedure for vertebrogenic back pain with some benefit, however his lumbar radiculopathy seems to be worst at this point.  I advised that the next logical step is consideration for surgery.  In the mean time, we can attempt an additional transforaminal epidural at L4/5 (area of worst stenosis) to see if we can give him any additional relief.    PLAN:   - I have stressed the importance of physical activity and a home exercise plan to help with pain and improve health.  - he will call to schedule his surgery with Dr. Yeh.  - start pregabalin 50mg BID; I advised to start with nightly for 1 week and increase to twice daily thereafter  - schedule for bilateral L4/5 transforaminal epidural steroid injection  - continue home exercise program as he has been doing so far  - counseled on not combining NSAIDs.  He can alternatively take tylenol along with NSAIDs if necessary.  - follow up after epidural injection    The above plan and management options were discussed at length with patient. Patient is in agreement with the above and verbalized understanding. It will be communicated with the referring physician via electronic record, fax, or mail.    Tomeka Lowry MD  Interventional Pain Management    03/05/2024

## 2024-03-08 ENCOUNTER — TELEPHONE (OUTPATIENT)
Dept: PAIN MEDICINE | Facility: CLINIC | Age: 74
End: 2024-03-08
Payer: MEDICARE

## 2024-03-14 NOTE — PRE-PROCEDURE INSTRUCTIONS
Unable to reach pt via phone.  Left voicemail with arrival time also informing pt of need for responsible  accompaniment and instructing pt to follow pre-procedure instructions provided via [facility] portal.  The following message was sent to pt's portal.          Dear David ,     You are scheduled for a pain procedure on 3/15/24 with Dr. Lowry, please report to [facility] [address] [facility].     Please park in the lot in front of the building and enter at the main entrance. Proceed to the second floor for registration.     Arrival time: 8:00 am     Anesthesia fasting instructions:   IV sedation. You should not eat for 8 hours and can only drink clear liquids (water or black coffee without cream/sugar) up until 2 hours before your scheduled time.  You CANNOT drive yourself and must have a .     *Refrain from drinking any alcohol  *Please insure that you have pre-planned your ride home IF YOU ARE to have sedation of any kind You CANNOT drive yourself home.    *You may not leave alone in an uber, taxi, etc. IF YOU HAVE received sedating medications by mouth or by vein  *You must be discharged to a responsible adult.   *Please remain under adult supervision for 24 hours if you had any form of sedation.   If possible, please have your visitor &/or ride home stay during your visit.   The surgeon should speak with your visitors after your procedure.  All visitors must be 18 years of age or older. Please limit your visitors to max of 2 people.        *You should take any medications that you routinely take for blood pressure, heart medications, thyroid, cholesterol, etc with small sips of water.   *If you are a diabetic, do not take your medication if you will be fasting, but bring it with you.   *Please plan on being here for roughly 2 hours.   *HOLD vitamins and supplements the morning of your procedure.  *HOLD any Anticoagulants (ex. Aspirin, goody or BC powder, Coumadin, Eliquis,  Heparin, Plavix, Lovenox, Xarelto, etc.) for a total of 5 days  *HOLD any non-insulin injections until after procedure (Ozempic, Mounjaro, Trulicity, Victoza, Byetta, Wegovy, Adlyxin, etc) (you should have been instructed to hold for a 7 day period)     Shower the night before and the morning of your procedure with antibacterial soap (ex. Dial)   Please do not use antibacterial soap to wash your face. Use your regular face soap (ex. Dial)  Do not apply any products to your skin nor your hair after you shower the morning of your procedure.         Products include: lotions, oils, ointments, creams, gels, powders, lotion, deodorant, make-up, perfume/cologne, after shave and sunscreen.     Wear loose, comfortable clothing.  No jewelry. No contacts. Bring glasses if necessary.  If you have dentures, please bring a case.  Please leave all jewelry and valuables at home.  Wear loose comfortable clothes (preferably an button up shirt), not all patients are placed in a gown      ---If you start to feel sick (fever, chills, coughing, sore throat, etc) or start on or have been taking any antibiotics, please contact your doctor's office at [phone] your procedure would have to be rescheduled.      Please reply to this message as receipt of delivery.     Thanks,  Catina, LPN [facility]  Pre-Admit Testing

## 2024-03-15 ENCOUNTER — HOSPITAL ENCOUNTER (OUTPATIENT)
Facility: HOSPITAL | Age: 74
Discharge: HOME | End: 2024-03-15
Attending: STUDENT IN AN ORGANIZED HEALTH CARE EDUCATION/TRAINING PROGRAM | Admitting: STUDENT IN AN ORGANIZED HEALTH CARE EDUCATION/TRAINING PROGRAM
Payer: MEDICARE

## 2024-03-15 VITALS
SYSTOLIC BLOOD PRESSURE: 150 MMHG | OXYGEN SATURATION: 98 % | TEMPERATURE: 99 F | DIASTOLIC BLOOD PRESSURE: 67 MMHG | HEART RATE: 74 BPM | RESPIRATION RATE: 16 BRPM

## 2024-03-15 DIAGNOSIS — M54.16 LUMBAR RADICULOPATHY: Primary | ICD-10-CM

## 2024-03-15 DIAGNOSIS — G89.29 CHRONIC PAIN: ICD-10-CM

## 2024-03-15 LAB — POCT GLUCOSE: 163 MG/DL (ref 70–110)

## 2024-03-15 PROCEDURE — 25000003 PHARM REV CODE 250: Performed by: STUDENT IN AN ORGANIZED HEALTH CARE EDUCATION/TRAINING PROGRAM

## 2024-03-15 PROCEDURE — 63600175 PHARM REV CODE 636 W HCPCS: Performed by: STUDENT IN AN ORGANIZED HEALTH CARE EDUCATION/TRAINING PROGRAM

## 2024-03-15 PROCEDURE — 64483 NJX AA&/STRD TFRM EPI L/S 1: CPT | Mod: 50 | Performed by: STUDENT IN AN ORGANIZED HEALTH CARE EDUCATION/TRAINING PROGRAM

## 2024-03-15 PROCEDURE — 64483 NJX AA&/STRD TFRM EPI L/S 1: CPT | Mod: 50,,, | Performed by: STUDENT IN AN ORGANIZED HEALTH CARE EDUCATION/TRAINING PROGRAM

## 2024-03-15 PROCEDURE — 25500020 PHARM REV CODE 255: Performed by: STUDENT IN AN ORGANIZED HEALTH CARE EDUCATION/TRAINING PROGRAM

## 2024-03-15 RX ORDER — LIDOCAINE HYDROCHLORIDE 10 MG/ML
INJECTION, SOLUTION EPIDURAL; INFILTRATION; INTRACAUDAL; PERINEURAL
Status: DISCONTINUED | OUTPATIENT
Start: 2024-03-15 | End: 2024-03-15 | Stop reason: HOSPADM

## 2024-03-15 RX ORDER — FENTANYL CITRATE 50 UG/ML
INJECTION, SOLUTION INTRAMUSCULAR; INTRAVENOUS
Status: DISCONTINUED | OUTPATIENT
Start: 2024-03-15 | End: 2024-03-15 | Stop reason: HOSPADM

## 2024-03-15 RX ORDER — MIDAZOLAM HYDROCHLORIDE 1 MG/ML
INJECTION INTRAMUSCULAR; INTRAVENOUS
Status: DISCONTINUED | OUTPATIENT
Start: 2024-03-15 | End: 2024-03-15 | Stop reason: HOSPADM

## 2024-03-15 RX ORDER — LIDOCAINE HYDROCHLORIDE 20 MG/ML
INJECTION, SOLUTION EPIDURAL; INFILTRATION; INTRACAUDAL; PERINEURAL
Status: DISCONTINUED | OUTPATIENT
Start: 2024-03-15 | End: 2024-03-15 | Stop reason: HOSPADM

## 2024-03-15 RX ORDER — SODIUM CHLORIDE 9 MG/ML
500 INJECTION, SOLUTION INTRAVENOUS CONTINUOUS
Status: DISCONTINUED | OUTPATIENT
Start: 2024-03-15 | End: 2024-03-15 | Stop reason: HOSPADM

## 2024-03-15 RX ORDER — DEXAMETHASONE SODIUM PHOSPHATE 10 MG/ML
INJECTION INTRAMUSCULAR; INTRAVENOUS
Status: DISCONTINUED | OUTPATIENT
Start: 2024-03-15 | End: 2024-03-15 | Stop reason: HOSPADM

## 2024-03-15 RX ORDER — LIDOCAINE HYDROCHLORIDE 10 MG/ML
1 INJECTION, SOLUTION EPIDURAL; INFILTRATION; INTRACAUDAL; PERINEURAL ONCE
Status: DISCONTINUED | OUTPATIENT
Start: 2024-03-15 | End: 2024-03-15 | Stop reason: HOSPADM

## 2024-03-15 NOTE — PLAN OF CARE
Pt in preop bay 28, VSS and IV inserted. Pt denies any open wounds on body or the use of any weight loss injections. Pt needs an updated H&P and consents consents, otherwise ready to roll.

## 2024-03-15 NOTE — OP NOTE
Lumbar Transforaminal Epidural Steroid Injection under Fluoroscopic Guidance    The procedure, risks, benefits, and options were discussed with the patient. There are no contraindications to the procedure. The patent expressed understanding and agreed to the procedure. Informed written consent was obtained prior to the start of the procedure and can be found in the patient's chart.    PATIENT NAME: David Solomon   MRN: [id number]     DATE OF PROCEDURE: 03/15/2024    PROCEDURE:  Bilateral  L4/5 Lumbar Transforaminal Epidural Steroid Injection under Fluoroscopic Guidance    PRE-OP DIAGNOSIS: Lumbar radiculopathy [M54.16] Lumbar radiculopathy [M54.16]    POST-OP DIAGNOSIS: Same    PHYSICIAN: Tomeka Lowry MD    ASSISTANTS: None     MEDICATIONS INJECTED: Preservative-free Decadron 10mg with 5cc of Lidocaine 1% MPF     LOCAL ANESTHETIC INJECTED: Xylocaine 2%     SEDATION: Versed 2mg and Fentanyl 100mcg                                                                                                                                                                                     Conscious sedation ordered by M.D. Patient re-evaluation prior to administration of conscious sedation. No changes noted in patient's status from initial evaluation. The patient's vital signs were monitored by RN and patient remained hemodynamically stable throughout the procedure.    Event Time In   Sedation Start 0948   Sedation End 0956       ESTIMATED BLOOD LOSS: None    COMPLICATIONS: None    TECHNIQUE: Time-out was performed to identify the patient and procedure to be performed. With the patient laying in a prone position, the surgical area was prepped and draped in the usual sterile fashion using ChloraPrep and a fenestrated drape.The levels were determined under fluoroscopy guidance. Skin anesthesia was achieved by injecting Lidocaine 2% over the injection sites. The transforaminal spaces were then approached with a 22 gauge, 5 inch spinal  quinke needle that was introduced under fluoroscopic guidance in the AP and Lateral views. Once the needle tip was in the area of the transforaminal space, and there was no blood, CSF or paraesthesias, contrast dye Omnipaque (240mg/mL) was injected to confirm placement and there was no vascular runoff. Fluoroscopic imaging in the AP and lateral views revealed a clear outline of the spinal nerve with proximal spread of agent through the neural foramen into the epidural space. 3 mL of the medication mixture listed above was injected slowly at each site. Displacement of the radio opaque contrast after injection of the medication confirmed that the medication went into the area of the transforaminal spaces. The needles were removed and bleeding was nil. A sterile dressing was applied. No specimens collected. The patient tolerated the procedure well.     The patient was monitored after the procedure in the recovery area. They were given post-procedure and discharge instructions to follow at home. The patient was discharged in a stable condition.    Tomeka Lowry MD

## 2024-03-15 NOTE — DISCHARGE INSTRUCTIONS
Home Care Instructions Pain Management:     1.  DIET:     You may resume your normal diet today.     2.  BATHING:     You may shower with luke warm water.     3.  DRESSING:     You may remove your bandage today.     4.  ACTIVITY LEVEL:       You may resume your normal activities 24 hours after your procedure.     5.  MEDICATIONS:     You may resume your normal medications today.     6.  SPECIAL INSTRUCTIONS:     No heat to the injection site for 24 hours including bath or shower, heating pad, moist heat or hot tubs.     Use an ice pack to the injection site for any pain or discomfort.  Apply ice packs for 20 minute intervals as needed.     If you have received any sedatives by mouth today, you can not drive for 12 hours.     If you have received sedation through an IV, you can not drive for 24 hours.     PLEASE CALL YOUR DOCTOR FOR THE FOLLOWIN.  Redness or swelling around the injection site.  2.  Fever of 101 degrees.  3.  Drainage (pus) from the injection site.  4.  For any continuous bleeding (some dried blood over the incision is normal.)     FOR EMERGENCIES:     If any unusual problems or difficulties occur during clinic hours, call [phone] or dial [phone].     Follow up with with your physician in 2-3 weeks.

## 2024-03-15 NOTE — DISCHARGE SUMMARY
Discharge Note  Short Stay      SUMMARY     Admit Date: 3/15/2024    Attending Physician: Tomeka Lowry MD PhD    Discharge Physician: Tomeka Lowry      Discharge Date: 3/15/2024 9:57 AM    Procedure(s) (LRB):  B/L L4-5 TFESI (Bilateral)    Final Diagnosis: Lumbar radiculopathy [M54.16]    Disposition: Home or self care    Patient Instructions:   Current Discharge Medication List        CONTINUE these medications which have NOT CHANGED    Details   atorvastatin (LIPITOR) 40 MG tablet Take 40 mg by mouth every evening.      diltiaZEM (CARDIZEM LA) 180 mg 24 hr tablet Take 180 mg by mouth once daily.      HUMALOG KWIKPEN INSULIN 100 unit/mL pen Inject into the skin.      hydroCHLOROthiazide (HYDRODIURIL) 12.5 MG Tab Take 12.5 mg by mouth once daily.      insulin glargine (LANTUS) 100 unit/mL injection Inject 22 Units into the skin once daily.      irbesartan (AVAPRO) 75 MG tablet Take 75 mg by mouth every evening.      pregabalin (LYRICA) 50 MG capsule Take 1 capsule (50 mg total) by mouth 2 (two) times daily.  Qty: 90 capsule, Refills: 2    Associated Diagnoses: DDD (degenerative disc disease), lumbar; Lumbar radiculopathy      HYDROcodone-acetaminophen (NORCO) 5-325 mg per tablet Take 1 tablet by mouth every 8 (eight) hours as needed for Pain.  Qty: 21 tablet, Refills: 0    Comments: Quantity prescribed more than 7 day supply? No  Associated Diagnoses: Vertebrogenic low back pain      meloxicam (MOBIC) 15 MG tablet Take 15 mg by mouth.                 Discharge Diagnosis: Lumbar radiculopathy [M54.16]  Condition on Discharge: Stable with no complications to procedure   Diet on Discharge: Same as before.  Activity: as per instruction sheet.  Discharge to: Home with a responsible adult.  Follow up: 2-4 weeks       Please call my office or pager at [phone] if experienced any weakness or loss of sensation, fever > 101.5, pain uncontrolled with oral medications, persistent nausea/vomiting/or diarrhea, redness or  drainage from the incisions, or any other worrisome concerns. If physician on call was not reached or could not communicate with our office for any reason please go to the nearest emergency department      Tomeka Lowry MD PhD

## 2024-05-09 ENCOUNTER — TELEPHONE (OUTPATIENT)
Dept: PAIN MEDICINE | Facility: CLINIC | Age: 74
End: 2024-05-09
Payer: MEDICARE

## 2024-06-04 ENCOUNTER — PATIENT MESSAGE (OUTPATIENT)
Dept: ORTHOPEDICS | Facility: CLINIC | Age: 74
End: 2024-06-04
Payer: MEDICARE

## 2024-06-18 ENCOUNTER — OFFICE VISIT (OUTPATIENT)
Dept: ORTHOPEDICS | Facility: CLINIC | Age: 74
End: 2024-06-18
Payer: MEDICARE

## 2024-06-18 VITALS — BODY MASS INDEX: 25.26 KG/M2 | HEIGHT: 68 IN | WEIGHT: 166.69 LBS

## 2024-06-18 DIAGNOSIS — M48.07 SPINAL STENOSIS, LUMBOSACRAL REGION: ICD-10-CM

## 2024-06-18 DIAGNOSIS — M47.816 LUMBAR SPONDYLOSIS: ICD-10-CM

## 2024-06-18 DIAGNOSIS — M51.36 DDD (DEGENERATIVE DISC DISEASE), LUMBAR: ICD-10-CM

## 2024-06-18 DIAGNOSIS — M54.16 LUMBAR RADICULOPATHY: Primary | ICD-10-CM

## 2024-06-18 DIAGNOSIS — Z98.890 HISTORY OF LUMBAR LAMINECTOMY: ICD-10-CM

## 2024-06-18 PROCEDURE — 3288F FALL RISK ASSESSMENT DOCD: CPT | Mod: CPTII,S$GLB,, | Performed by: ORTHOPAEDIC SURGERY

## 2024-06-18 PROCEDURE — 99214 OFFICE O/P EST MOD 30 MIN: CPT | Mod: S$GLB,,, | Performed by: ORTHOPAEDIC SURGERY

## 2024-06-18 PROCEDURE — 99999 PR PBB SHADOW E&M-EST. PATIENT-LVL V: CPT | Mod: PBBFAC,,, | Performed by: ORTHOPAEDIC SURGERY

## 2024-06-18 PROCEDURE — 1160F RVW MEDS BY RX/DR IN RCRD: CPT | Mod: CPTII,S$GLB,, | Performed by: ORTHOPAEDIC SURGERY

## 2024-06-18 PROCEDURE — 1159F MED LIST DOCD IN RCRD: CPT | Mod: CPTII,S$GLB,, | Performed by: ORTHOPAEDIC SURGERY

## 2024-06-18 PROCEDURE — 1101F PT FALLS ASSESS-DOCD LE1/YR: CPT | Mod: CPTII,S$GLB,, | Performed by: ORTHOPAEDIC SURGERY

## 2024-06-18 PROCEDURE — 4010F ACE/ARB THERAPY RXD/TAKEN: CPT | Mod: CPTII,S$GLB,, | Performed by: ORTHOPAEDIC SURGERY

## 2024-06-18 PROCEDURE — 3008F BODY MASS INDEX DOCD: CPT | Mod: CPTII,S$GLB,, | Performed by: ORTHOPAEDIC SURGERY

## 2024-06-18 PROCEDURE — 1125F AMNT PAIN NOTED PAIN PRSNT: CPT | Mod: CPTII,S$GLB,, | Performed by: ORTHOPAEDIC SURGERY

## 2024-06-18 RX ORDER — GLUCAGON 1 MG
1 VIAL (EA) INJECTION
COMMUNITY
Start: 2024-04-10

## 2024-06-18 RX ORDER — GABAPENTIN 100 MG/1
CAPSULE ORAL
COMMUNITY
Start: 2024-03-12

## 2024-06-18 RX ORDER — GABAPENTIN 300 MG/1
300 CAPSULE ORAL 3 TIMES DAILY
COMMUNITY
Start: 2024-06-12

## 2024-06-18 RX ORDER — AMLODIPINE BESYLATE 10 MG/1
TABLET ORAL
COMMUNITY
Start: 2024-03-12

## 2024-06-18 RX ORDER — AMLODIPINE BESYLATE 10 MG/1
10 TABLET ORAL
COMMUNITY
Start: 2024-06-07

## 2024-06-18 RX ORDER — AMITRIPTYLINE HYDROCHLORIDE 10 MG/1
TABLET, FILM COATED ORAL
COMMUNITY
Start: 2024-05-21

## 2024-06-18 NOTE — PROGRESS NOTES
"DATE: 6/19/2024  PATIENT: David Solomon    Attending Physician: Suresh Yeh M.D.    HISTORY:  David Solomon is a 74 y.o. male who returns to me today for FU. Patient was last seen on 2/20/24, and he had L4-5 decompression on 4/12/24 at [facility]. He continues to have LBP that radiates posterolaterally down RLE to the calf and down LLE to the knee. He had a repeat postop MRI. He has been taking meds without much relief. He is miserable and wants surgical intervention.    The patient does not smoke or endorse IVDU. He has type I DM (HA1C of 6.2 according to pt). The patient is not on any blood thinners and does not take chronic narcotics. 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    PMH/PSH/FamHx/SocHx:  Unchanged from prior visit    ROS:  Positive for LBP and BLE pain  Denies perineal paresthesias, bowel or bladder incontinence    EXAM:  Ht 5' 8" (1.727 m)   Wt 75.6 kg (166 lb 10.7 oz)   BMI 25.34 kg/m²     My physical examination was notable for the following findings: motor intact BLE; SILT    IMAGING:  Today I independently reviewed the following images and my interpretations are as follows:    Previous L-spine XRs showed spondylosis and DDD without listhesis.    MRI lumbar ([facility], accessed online) showed R L4 laminotomy. Persistent L4-5 severe central and b/l foraminal stenosis.       ASSESSMENT/PLAN:  Patient has lumbar spondylosis and stenosis with BLE (R>L) radiculopathy, in the setting of prior L4-5 decompression. I discussed the natural history of their diagnoses as well as surgical and nonsurgical treatment options. I educated the patient on the importance of core/back strengthening, correct posture, bending/lifting ergonomics, and low-impact aerobic exercises (walking, elliptical, and aquatherapy). Continue medications. Patient has failed conservative management and is a candidate for a revision L4-5 PLDF/TLIF (R). I ordered CT lumbar for preop planning. He will need " preop clearance.    I had a sit down discussion with the patient and wife regarding the above surgery. We specifically discussed the risks, benefits, and alternatives to surgery. We discussed the surgical procedure including the skin incision, nerve decompression, bone fusion, allograft, iliac crest bone graft, and surgical implants including pedicle screws and interbody devices as indicated: they understand the risks include but are not limited to death, paralysis, blindness, bleeding, infection, damage to arteries, veins and nerves, spinal fluid leak, continued or worsening pain, no improvement in symptoms, non-union, and the possible need for more surgery in the future, the possible need for perioperative blood transfusion, as well as the possibility other unforseen and unknown complications. We talked about expected hospital stay and recovery period. All questions were answered; they understand and wish to proceed.     Suresh Yeh MD  Orthopaedic Spine Surgeon  Department of Orthopaedic Surgery  [phone]

## 2024-06-19 ENCOUNTER — PATIENT MESSAGE (OUTPATIENT)
Dept: ORTHOPEDICS | Facility: CLINIC | Age: 74
End: 2024-06-19
Payer: MEDICARE

## 2024-06-28 ENCOUNTER — HOSPITAL ENCOUNTER (OUTPATIENT)
Dept: RADIOLOGY | Facility: HOSPITAL | Age: 74
Discharge: HOME | End: 2024-06-28
Attending: ORTHOPAEDIC SURGERY
Payer: MEDICARE

## 2024-06-28 PROCEDURE — 72131 CT LUMBAR SPINE W/O DYE: CPT | Mod: 26,,, | Performed by: RADIOLOGY

## 2024-06-28 PROCEDURE — 72131 CT LUMBAR SPINE W/O DYE: CPT | Mod: TC

## 2024-07-29 DIAGNOSIS — M79.604 PAIN IN BOTH LOWER EXTREMITIES: ICD-10-CM

## 2024-07-29 DIAGNOSIS — M79.605 PAIN IN BOTH LOWER EXTREMITIES: ICD-10-CM

## 2024-07-29 DIAGNOSIS — E11.9 DIABETES MELLITUS WITHOUT COMPLICATION: ICD-10-CM

## 2024-07-29 DIAGNOSIS — Z01.818 PREOPERATIVE TESTING: Primary | ICD-10-CM

## 2024-07-29 RX ORDER — PREGABALIN 150 MG/1
150 CAPSULE ORAL NIGHTLY
Status: ON HOLD | COMMUNITY
End: 2024-08-28 | Stop reason: HOSPADM

## 2024-07-29 NOTE — ANESTHESIA PAT ROS NOTE
8/21/2024  David Solomon is a 74 y.o., male, with Lumbar Radiculopathy, arrives for anesthesia assessment and preop instructions.      Pre-op Assessment    I have reviewed the Patient Summary Reports.     I have reviewed the Nursing Notes. I have reviewed the NPO Status.   I have reviewed the Medications.     Review of Systems  Anesthesia Hx:  No problems with previous Anesthesia             Denies Family Hx of Anesthesia complications.    Denies Personal Hx of Anesthesia complications.                    Social:  Non-Smoker, Alcohol Use       Hematology/Oncology:    Oncology Normal    -- Denies Anemia:               Hematology Comments: B12 therapy                    EENT/Dental:  denies chronic allergic rhinitis  Eyes: Visual Impairment   Has Bilateral and S/P Extraction - Bilateral Catarract         5/15/2022  Phacoemulsification of cataract (Left)   5/15/2022  Intraocular prostheses insertion (Left)   3/27/2022  Phacoemulsification of cataract (Right)   3/27/2022  Intraocular prostheses insertion (Right)   03/27/2022  Cataract extraction (Right)     Stable proliferative diabetic retinopathy of both eyes associated with type 2 diabetes mellitus, Age-related nuclear cataract of both eyes,   Dry eye syndrome of both eyes,   Vitreous degeneration of both eyes         Cardiovascular:  Exercise tolerance: poor   Denies Pacemaker. Hypertension, well controlled       Denies Angina.    denies PVD no hyperlipidemia  Denies MARCELINO.    Functional Capacity 4 METS                   Hypertension         Pulmonary:    Denies COPD.  Denies Asthma.   Denies Shortness of breath.  Denies Recent URI. Sleep Apnea                Renal/:   renal calculi               Hepatic/GI:   Denies PUD.  Denies Hiatal Hernia.  Denies GERD. Denies Liver Disease.  Denies Hepatitis. Bilateral inguinal hernioplasty          Musculoskeletal:      Chronic pain of left knee, Patellofemoral syndrome of left knee, Musculoskeletal General/Symptoms: low back pain.      Joint Disease:  Arthritis, Osteoarthritis, spine 3/15/2024  Epidural steroid injection into lumbar spine (Bilateral)   2/19/2024  Destruction, intraosseous basivertebral nerve, 1st two bodies, lum/sac (N/A)   1/2/2024  Epidural steroid injection into lumbar spine (Bilateral)   12/1/2023  Epidural steroid injection into lumbar spine (N/A)       Spine Disorders: lumbar Disc disease, Degenerative disease and Chronic Pain Degenerative Joint Disease       Thoracic Spine Disorders, Thoracic Disc Disease 6/18/2024 CT LUMBAR SPINE WITHOUT CONTRAST     CLINICAL HISTORY:  Spinal stenosis, lumbar;  Spinal stenosis, lumbosacral region     TECHNIQUE:  Low-dose axial, sagittal and coronal reformations are obtained through the lumbar spine.  Contrast was not administered.     COMPARISON:  10/04/2023     FINDINGS:  Alignment: There is levoconvex curvature of the lower lumbar spine.  There is straightening of lumbar lordosis.     Vertebrae: No fracture. No lytic or blastic lesion.     Discs: Severe disc height loss at L4-L5 with degenerative endplate changes.     Sacroiliac joints: Normal.     Degenerative findings:   T12-L1: Circumferential disc bulge results in mild effacement of the thecal sac.     L1-L2: No spinal canal stenosis or neural foraminal narrowing.     L2-L3: Circumferential disc bulge and mild facet arthropathy result in mild effacement of the thecal sac and mild left neural foraminal narrowing.     L3-L4: Circumferential disc bulge and mild facet arthropathy result in mild-to-moderate spinal canal stenosis and mild-to-moderate bilateral neural foraminal narrowing.     L4-L5: Status post right laminectomy.  Note made of mild-to-moderate left neural foraminal narrowing.     L5-S1: Circumferential disc bulge and mild facet arthropathy result in mild to moderate left and mild right neural foraminal  narrowing.     Paraspinal muscles & soft tissues: Prostate is enlarged.  Urinary bladder exhibits circumferential wall thickening.  Vascular calcifications noted.  There are multiple punctate nonobstructing renal calculi bilaterally.     Impression:   1. Multilevel degenerative changes of the lumbar spine detailed above.  2. Additional findings include prostatomegaly, vascular calcifications, and bilateral nonobstructing nephrolithiasis.      Electronically signed by:Alphonso Reece MD  Date:   06/28/2024       Lumbar Spine Disorders, Lumbar Disc Disease, Radiculopathy     10/4/2023 XR LUMBAR SPINE 5 VIEW WITH FLEX AND EXT     CLINICAL HISTORY:  Radiculopathy, lumbar region     TECHNIQUE:  AP, lateral, bilateral oblique and spot images were performed of the lumbar spine.  Additional flexion and extension views obtained.     COMPARISON:  None     FINDINGS:  Vertebral bodies are normal in height without evidence of fracture.  No pars defects.     Mild rightward convex curvature the upper lumbar spine.  Mild straightening of the normal lumbar lordosis.  No significant spondylolisthesis.  No abnormal translation with flexion and extension.     Degenerative endplate changes at a few levels with loss of disc height most notable at T12-L1 and L2-3.  Hypertrophic facet arthropathy in the lower lumbar spine.     Impression:   Degenerative change as above.      Electronically signed by:Demond Jiménez MD  Date:   10/04/2023  Time:   15:31    Neurological:  Denies TIA.  Denies CVA. Neuromuscular Disease,   Denies Headaches. Denies Seizures.     Neuro Symptoms of pain, tingling, weakness      Chronic Pain Syndrome Pain Etiology/Diagnosis, Osteoarthritis, spine, Low Back Pain, Lumbar Radiculopathy                           Endocrine:  Diabetes, well controlled, type 1 Denies Hypothyroidism.  Denies Hyperthyroidism.  Diabetes, Type 1 Diabetes  , Complications include Diabetic Retinopathy , controlled by insulin, insulin in the  past.                 Denies Obesity / BMI > 30, Denies Morbid Obesity / BMI > 40      Physical Exam  General: Well nourished, Cooperative, Alert and Oriented    Airway:  Mouth Opening: Normal  Tongue: Normal  Neck ROM: Normal ROM    Dental:  Caps / Implants, Intact    Chest/Lungs:  Clear to auscultation, Normal Respiratory Rate    Heart:  Rate: Normal  Rhythm: Regular Rhythm  Sounds: Normal          Anesthesia Assessment: Preoperative EQUATION    Planned Procedure: Procedure(s) (LRB):  FUSION, SPINE, LUMBAR, TLIF, POSTERIOR APPROACH, USING PEDICLE SCREW L4-5 (L) GLOBUS ROBOT SNS:SSEP/EMG (N/A)  Requested Anesthesia Type:General  Surgeon: Suresh Yeh MD  Service: Neurosurgery  Known or anticipated Date of Surgery:8/27/2024    Surgeon notes: reviewed    Electronic QUestionnaire Assessment completed via nurse interview with patient.        Triage considerations:     The patient has no apparent active cardiac condition (No unstable coronary Syndrome such as severe unstable angina or recent [<1 month] myocardial infarction, decompensated CHF, severe valvular   disease or significant arrhythmia)    Previous anesthesia records:No problems and [facility] 2/19/2024    DESTRUCTION,INTRAOSSEOUS BASIVERTEBRAL NERVE,1ST TWO BODIES,LUM/SAC   Airway:  Mallampati: II   Mouth Opening: Normal  TM Distance: Normal  Tongue: Normal  Neck ROM: Normal ROM    Last PCP note: within 3 months , outside [facility]   Subspecialty notes: Allergy, Cardiology: General, Ortho, Pain Management, Spine Service    Other important co-morbidities: PER EPIC: DM2, HLD, HTN, RUBÉN, and LUMBAR RADICULOPATHY       Tests already available:  Available tests,  within 3 months , 6-12 months ago , within [facility] . 6/28/2024 CT LUMBAR SPINE W/O CONTRAST, 10/4/2023 XRAY LUMBAR COMPLETE INCLUDING F&E            Instructions given. (See in Nurse's note)    Optimization:  Anesthesia Preop Clinic Assessment  Indicated    Medical Opinion Indicated        Sub-specialist consult indicated:   CARDIOLOGY       Plan:    Testing:  A1C, BMP, EKG, Hematology Profile, PT/INR, PTT, T&S, and UA   Pre-anesthesia  visit       Visit focus: concerns in complex and/or prolonged anesthesia, COMORBIDITIES     Consultation:Patient's PCP for re-evaluation     Patient  has previously scheduled Medical Appointment:NONE    Navigation: Tests Scheduled. TBD             Consults scheduled.TBD             Results will be tracked by Preop Clinic.  8/6 Cardiac clearance given by Juan M Cavanaugh NP on 8/2. Received EKG. ( All scanned to media)  8/16 Received medical clearance from Dr. Daniel Emmanuel on 8/16 ( scanned to media)  Melissa Vazquez RN BSN

## 2024-07-29 NOTE — PRE-PROCEDURE INSTRUCTIONS
Patient stated has not had any problem with anesthesia in the past. Will need medical clearance from your PCP, Dr. Daniel Emmanuel. He will make an an appt.. Will need cardiac clearance with Dr.Chirs Hernandez.  He said he hd an Echo last week and has an appt on 8/2 with Dr. Hernandez. Will need poc appt, labs, ua, and EKG. Our  will call to set up these appts.         Preop instructions given. Hold aspirin, aspirin containing products, nsaids( Aleve, Advil, Motrin, Ibuprofen, Naprosyn, Naproxen, Voltaren, Diclofenac, Mobic, Meloxicam, Celebrex, Celecoxib), vitamins and supplements one week prior to surgery.     May take Tylenol.( Also sent to [facility] portal) Verbalzies understanding.

## 2024-07-30 ENCOUNTER — TELEPHONE (OUTPATIENT)
Dept: PREADMISSION TESTING | Facility: HOSPITAL | Age: 74
End: 2024-07-30
Payer: MEDICARE

## 2024-07-31 ENCOUNTER — TELEPHONE (OUTPATIENT)
Dept: ORTHOPEDICS | Facility: CLINIC | Age: 74
End: 2024-07-31
Payer: MEDICARE

## 2024-07-31 NOTE — TELEPHONE ENCOUNTER
Return call back to patient regarding message. Patient stated that he will get the note from his cardiology and fax it back to [phone]. Patient stated thank you. Thanks.

## 2024-07-31 NOTE — TELEPHONE ENCOUNTER
Attempt to contact patient regarding message. Left message stating that he need his cardiology to write a note clearing him for surgery and they can fax it back to [phone] or email it to [email or web address]. Also left number for patient to return call back to [phone]. Thanks.

## 2024-08-20 NOTE — DISCHARGE INSTRUCTIONS
Your surgery has been scheduled for:_8/27/24    You should report to:  _x___[facility], located on the [facility] side of the   Second floor of the [facility] ([phone])    Please Note   Tell your doctor if you take Aspirin, products containing Aspirin, herbal medications  or blood thinners, such as Coumadin, Ticlid, or Plavix.  (Consult your provider regarding holding or stopping before surgery).  Arrange for someone to drive you home following surgery.  You will not be allowed to leave the surgical facility alone or drive yourself home following sedation and anesthesia.    Before Surgery  Stop taking all herbal medications, vitamins, and supplements 7 days prior to surgery  No Motrin/Advil (Ibuprofen) 7 days before surgery  No Aleve (Naproxen) 7 days before surgery  Stop Taking Asprin, products containing Asprin __7___days before surgery  Stop taking blood thinners_______days before surgery  No Goody's/BC  Powder 7 days before surgery  Refrain from drinking alcoholic beverages for 24hours before and after surgery  Stop or limit smoking ___7______days before surgery  You may take Tylenol for pain    Night before Surgery  Do not eat or drink after midnight  Take a shower or bath (shower is recommended).  Bathe with Hibiclens soap or an antibacterial soap from the neck down.  If not supplied by your surgeon, hibiclens soap will need to be purchased over the counter in pharmacy.  Rinse soap off thoroughly.  Shampoo your hair with your regular shampoo    The Day of Surgery  Take another bath or shower with hibiclens or any antibacterial soap, to reduce the chance of infection.  Take heart and blood pressure medications with a small sip of water, as advised by the perioperative team.  Do not take fluid pills  You may brush your teeth and rinse your mouth, but do not swall any additional water.   Do not apply perfumes, powder, body lotions or deodorant on the day of  surgery.  Nail polish should be removed.  Do not wear makeup or moisturizer  Wear comfortable clothes, such as a button front shirt and loose fitting pants.  Leave all jewelry, including body piercings, and valuables at home.    Bring any devices you will neeed after surgery such as crutches or canes.  If you have sleep apnea, please bring your CPAP machine  In the event that your physical condition changes including the onset of a cold or respiratory illness, or if you have to delay or cancel your surgery, please notify your surgeon.         Anesthesia: General Anesthesia     You are watched continuously during your procedure by your anesthesia provider.     Youre due to have surgery. During surgery, youll be given medicine called anesthesia or anesthetic. This will keep you comfortable and pain-free. Your anesthesia provider will use general anesthesia.  What is general anesthesia?  General anesthesia puts you into a state like deep sleep. It goes into the bloodstream (IV anesthetics), into the lungs (gas anesthetics), or both. You feel nothing during the procedure. You will not remember it. During the procedure, the anesthesia provider monitors you continuously. He or she checks your heart rate and rhythm, blood pressure, breathing, and blood oxygen.  IV anesthetics. IV anesthetics are given through an IV line in your arm. Theyre often given first. This is so you are asleep before a gas anesthetic is started. Some kinds of IV anesthetics relieve pain. Others relax you. Your doctor will decide which kind is best in your case.  Gas anesthetics. Gas anesthetics are breathed into the lungs. They are often used to keep you asleep. They can be given through a facemask or a tube placed in your larynx or trachea (breathing tube).  If you have a facemask, your anesthesia provider will most likely place it over your nose and mouth while youre still awake. Youll breathe oxygen through the mask as your IV anesthetic is  started. Gas anesthetic may be added through the mask.  If you have a tube in the larynx or trachea, it will be inserted into your throat after youre asleep.  Anesthesia tools and medicines  You will likely have:  IV anesthetics. These are put into an IV line into your bloodstream.  Gas anesthetics. You breathe these anesthetics into your lungs, where they pass into your bloodstream.  Pulse oximeter. This is a small clip that is attached to the end of your finger. This measures your blood oxygen level.  Electrocardiography leads (electrodes). These are small sticky pads that are placed on your chest. They record your heart rate and rhythm.  Blood pressure cuff. This reads your blood pressure.  Risks and possible complications  General anesthesia has some risks. These include:  Breathing problems  Nausea and vomiting  Sore throat or hoarseness (usually temporary)  Allergic reaction to the anesthetic  Irregular heartbeat (rare)  Cardiac arrest (rare)   Anesthesia safety  Follow all instructions you are given for how long not to eat or drink before your procedure.  Be sure your doctor knows what medicines and drugs you take. This includes over-the-counter medicines, herbs, supplements, alcohol or other drugs. You will be asked when those were last taken.  Have an adult family member or friend drive you home after the procedure.  For the first 24 hours after your surgery:  Do not drive or use heavy equipment.  Do not make important decisions or sign legal documents. If important decisions or signing legal documents is necessary during the first 24 hours after surgery, have a trusted family member or spouse act on your behalf.  Avoid alcohol.  Have a responsible adult stay with you. He or she can watch for problems and help keep you safe.  Date Last Reviewed: 12/1/2016 © 2000-2017 [employer]. [address] [place] [address], [place] [address]. All rights reserved. This information is not intended as a substitute  for professional medical care. Always follow your healthcare professional's instructions.

## 2024-08-21 ENCOUNTER — HOSPITAL ENCOUNTER (OUTPATIENT)
Dept: PREADMISSION TESTING | Facility: HOSPITAL | Age: 74
Discharge: HOME | End: 2024-08-21
Attending: ORTHOPAEDIC SURGERY
Payer: MEDICARE

## 2024-08-21 VITALS
BODY MASS INDEX: 24.96 KG/M2 | SYSTOLIC BLOOD PRESSURE: 148 MMHG | WEIGHT: 164.69 LBS | OXYGEN SATURATION: 100 % | HEIGHT: 68 IN | HEART RATE: 74 BPM | DIASTOLIC BLOOD PRESSURE: 70 MMHG | TEMPERATURE: 98 F

## 2024-08-26 ENCOUNTER — TELEPHONE (OUTPATIENT)
Dept: ORTHOPEDICS | Facility: CLINIC | Age: 74
End: 2024-08-26
Payer: MEDICARE

## 2024-08-26 ENCOUNTER — ANESTHESIA EVENT (OUTPATIENT)
Dept: SURGERY | Facility: HOSPITAL | Age: 74
End: 2024-08-26
Payer: MEDICARE

## 2024-08-26 NOTE — ANESTHESIA PREPROCEDURE EVALUATION
[facility]  Anesthesia Pre-Operative Evaluation         Patient Name: David Solomon  YOB: 1950  MRN: [id number]    SUBJECTIVE:     Pre-operative evaluation for Procedure(s) (LRB):  FUSION, SPINE, LUMBAR, TLIF, POSTERIOR APPROACH, USING PEDICLE SCREW L4-5 (L) GLOBUS ROBOT SNS:SSEP/EMG (N/A)     08/26/2024    David Solomon is a 74 y.o. male w/ a significant PMHx of T2DM and HTN presents with lumbar spondylosis and stenosis with lumbar radiculopathy; persistent despite pain medicine interventions. Above procedure recommended.     Patient now presents for the above procedure(s).      LDA: None documented.     Prev airway: None documented.    Drips: None documented.      Patient Active Problem List   Diagnosis    Chronic pain of left knee    Patellofemoral syndrome of left knee    Stable proliferative diabetic retinopathy of both eyes associated with type 2 diabetes mellitus    Age-related nuclear cataract of both eyes    Dry eye syndrome of both eyes    Vitreous degeneration of both eyes       Review of patient's allergies indicates:  No Known Allergies    Current Inpatient Medications:      Current Facility-Administered Medications on File Prior to Encounter   Medication Dose Route Frequency Provider Last Rate Last Admin    phenylephrine HCL 2.5% ophthalmic solution 1 drop  1 drop Right Eye On Call Procedure Divine Ricketts MD   1 drop at 03/27/22 0813    phenylephrine HCL 2.5% ophthalmic solution 1 drop  1 drop Left Eye On Call Procedure Divine Ricketts MD   1 drop at 05/15/22 0948    proparacaine 0.5 % ophthalmic solution 1 drop  1 drop Right Eye On Call Procedure Divine Ricketts MD   1 drop at 03/27/22 0802    proparacaine 0.5 % ophthalmic solution 1 drop  1 drop Left Eye On Call Procedure Divine Ricketts MD   1 drop at 05/15/22 0936    tropicamide 1% ophthalmic solution 1 drop  1 drop Right Eye On Call Procedure Divine Ricketts MD   1 drop at 03/27/22 0813     tropicamide 1% ophthalmic solution 1 drop  1 drop Left Eye On Call Procedure Divine Ricketts MD   1 drop at 05/15/22 0903     Current Outpatient Medications on File Prior to Encounter   Medication Sig Dispense Refill    amitriptyline (ELAVIL) 10 MG tablet TAKE 1 TABLET BY MOUTH ONCE DAILY AT NIGHT AT BEDTIME      amLODIPine (NORVASC) 10 MG tablet Take 10 mg by mouth.      atorvastatin (LIPITOR) 40 MG tablet Take 40 mg by mouth every evening.      diltiaZEM (CARDIZEM LA) 180 mg 24 hr tablet Take 180 mg by mouth once daily.      gabapentin (NEURONTIN) 300 MG capsule Take 300 mg by mouth 3 (three) times daily.      GLUCAGON EMERGENCY KIT, HUMAN, 1 mg injection Inject 1 mL into the muscle.      HUMALOG KWIKPEN INSULIN 100 unit/mL pen Inject into the skin.      hydroCHLOROthiazide (HYDRODIURIL) 12.5 MG Tab Take 12.5 mg by mouth once daily.      HYDROcodone-acetaminophen (NORCO) 5-325 mg per tablet Take 1 tablet by mouth every 8 (eight) hours as needed for Pain. 21 tablet 0    insulin glargine (LANTUS) 100 unit/mL injection Inject 25 Units into the skin once daily.      irbesartan (AVAPRO) 75 MG tablet Take 75 mg by mouth every evening.      meloxicam (MOBIC) 15 MG tablet Take 15 mg by mouth.      pregabalin (LYRICA) 150 MG capsule Take 150 mg by mouth every evening.         Past Surgical History:   Procedure Laterality Date    CATARACT EXTRACTION Right 03/27/2022    Dr. Ricketts    DESTRUCTION, INTRAOSSEOUS BASIVERTEBRAL NERVE, 1ST TWO BODIES, LUM/SAC N/A 2/19/2024    Procedure: DESTRUCTION,INTRAOSSEOUS BASIVERTEBRAL NERVE,1ST TWO BODIES,LUM/SAC;  Surgeon: Tomeka Lowry MD;  Location: [facility] OR;  Service: Pain Management;  Laterality: N/A;    EPIDURAL STEROID INJECTION INTO LUMBAR SPINE N/A 12/1/2023    Procedure: L5-S1 LESI;  Surgeon: Tomeka Lowry MD;  Location: [facility] PAIN MANAGEMENT;  Service: Pain Management;  Laterality: N/A;  oral 20 mins    EPIDURAL STEROID INJECTION INTO LUMBAR SPINE Bilateral 1/2/2024     Procedure: B/L L5-S1 TFESI;  Surgeon: Tyree Ochoa DO;  Location: [facility] PAIN MANAGEMENT;  Service: Pain Management;  Laterality: Bilateral;  20 mins    EPIDURAL STEROID INJECTION INTO LUMBAR SPINE Bilateral 3/15/2024    Procedure: B/L L4-5 TFESI;  Surgeon: Tomeka Lowry MD;  Location: [facility] PAIN MANAGEMENT;  Service: Pain Management;  Laterality: Bilateral;  20 mins    INTRAOCULAR PROSTHESES INSERTION Right 3/27/2022    Procedure: INSERTION, IOL PROSTHESIS;  Surgeon: Divine Ricketts MD;  [facility];  Service: Ophthalmology;  Laterality: Right;    INTRAOCULAR PROSTHESES INSERTION Left 5/15/2022    Procedure: INSERTION, IOL PROSTHESIS;  Surgeon: Divine Ricketts MD;  Location: [facility] OR [address] [place] [address];  Service: Ophthalmology;  Laterality: Left;    PHACOEMULSIFICATION OF CATARACT Right 3/27/2022    Procedure: PHACOEMULSIFICATION, CATARACT;  Surgeon: Divine Ricketts MD;  Location: [facility] OR [address] [place] [address];  Service: Ophthalmology;  Laterality: Right;    PHACOEMULSIFICATION OF CATARACT Left 5/15/2022    Procedure: PHACOEMULSIFICATION, CATARACT;  Surgeon: Divine Ricketts MD;  Location: [facility] OR [address] [place] [address];  Service: Ophthalmology;  Laterality: Left;       Social History:  Tobacco Use: Low Risk  (6/18/2024)    Patient History     Smoking Tobacco Use: Never     Smokeless Tobacco Use: Never     Passive Exposure: Not on file      Alcohol Use: Not on file        OBJECTIVE:     Vital Signs Range (Last 24H):         Significant Labs:  Lab Results   Component Value Date    WBC 8.60 08/21/2024    HGB 13.9 (L) 08/21/2024    HCT 40.3 08/21/2024     08/21/2024    ALT 31 03/17/2017    AST 30 03/17/2017     08/21/2024    K 3.9 08/21/2024     08/21/2024    CREATININE 1.3 08/21/2024    BUN 24 (H) 08/21/2024    CO2 25 08/21/2024    INR 1.0 08/21/2024    HGBA1C 6.2 (H) 08/21/2024       Diagnostic Studies: No relevant studies.    EKG:   No results found for this or any previous visit.    2D ECHO:  TTE:  No  results found for this or any previous visit.    CHAPINCITO:  No results found for this or any previous visit.    ASSESSMENT/PLAN:       Pre-op Assessment    I have reviewed the Patient Summary Reports.     I have reviewed the Nursing Notes. I have reviewed the NPO Status.   I have reviewed the Medications.     Review of Systems  Anesthesia Hx:  No problems with previous Anesthesia               Denies Personal Hx of Anesthesia complications.                    Cardiovascular:     Hypertension                                        Pulmonary:  Pulmonary Normal                       Renal/:  Renal/ Normal                 Hepatic/GI:  Hepatic/GI Normal                 Neurological:  Neurology Normal                                      Endocrine:  Diabetes               Physical Exam  General: Well nourished, Cooperative, Alert and Oriented    Airway:  Mallampati: III / II  Mouth Opening: Normal  Tongue: Normal  Neck ROM: Normal ROM    Dental:  Intact        Anesthesia Plan  Type of Anesthesia, risks & benefits discussed:    Anesthesia Type: Gen ETT  Intra-op Monitoring Plan: Standard ASA Monitors  Post Op Pain Control Plan: multimodal analgesia and IV/PO Opioids PRN  Induction:  IV  Airway Plan: Direct, Post-Induction  Informed Consent: Informed consent signed with the Patient and all parties understand the risks and agree with anesthesia plan.  All questions answered.   ASA Score: 3  Day of Surgery Review of History & Physical: H&P Update referred to the surgeon/provider.    Ready For Surgery From Anesthesia Perspective.     .

## 2024-08-26 NOTE — TELEPHONE ENCOUNTER
Spoke to pt, informed his arrival time for surgery tomorrow is 5:00am at [facility] 2nd floor. No food or drink after midnight. Reminded pt to shower with Hibiclens antibacterial soap tonight and tomorrow morning. Pt pleased and verbalized understanding.

## 2024-08-27 ENCOUNTER — ANESTHESIA (OUTPATIENT)
Dept: SURGERY | Facility: HOSPITAL | Age: 74
End: 2024-08-27
Payer: MEDICARE

## 2024-08-27 ENCOUNTER — HOSPITAL ENCOUNTER (OUTPATIENT)
Facility: HOSPITAL | Age: 74
LOS: 1 days | Discharge: HOME HEALTH | End: 2024-08-29
Attending: ORTHOPAEDIC SURGERY | Admitting: ORTHOPAEDIC SURGERY
Payer: MEDICARE

## 2024-08-27 DIAGNOSIS — M54.16 LUMBAR RADICULOPATHY: Primary | ICD-10-CM

## 2024-08-27 DIAGNOSIS — Z98.1 S/P LUMBAR SPINAL FUSION: Primary | ICD-10-CM

## 2024-08-27 LAB
POCT GLUCOSE: 144 MG/DL (ref 70–110)
POCT GLUCOSE: 76 MG/DL (ref 70–110)
POCT GLUCOSE: 78 MG/DL (ref 70–110)

## 2024-08-27 PROCEDURE — 63600175 PHARM REV CODE 636 W HCPCS

## 2024-08-27 PROCEDURE — 25000003 PHARM REV CODE 250

## 2024-08-27 PROCEDURE — 36000711: Performed by: ORTHOPAEDIC SURGERY

## 2024-08-27 PROCEDURE — 63600175 PHARM REV CODE 636 W HCPCS: Performed by: ORTHOPAEDIC SURGERY

## 2024-08-27 PROCEDURE — 22840 INSERT SPINE FIXATION DEVICE: CPT | Mod: 82,,, | Performed by: STUDENT IN AN ORGANIZED HEALTH CARE EDUCATION/TRAINING PROGRAM

## 2024-08-27 PROCEDURE — 22633 ARTHRD CMBN 1NTRSPC LUMBAR: CPT | Mod: 62,22,, | Performed by: STUDENT IN AN ORGANIZED HEALTH CARE EDUCATION/TRAINING PROGRAM

## 2024-08-27 PROCEDURE — C1729 CATH, DRAINAGE: HCPCS | Performed by: ORTHOPAEDIC SURGERY

## 2024-08-27 PROCEDURE — 63052 LAM FACETC/FRMT ARTHRD LUM 1: CPT | Mod: 62,,, | Performed by: ORTHOPAEDIC SURGERY

## 2024-08-27 PROCEDURE — 22853 INSJ BIOMECHANICAL DEVICE: CPT | Mod: ,,, | Performed by: ORTHOPAEDIC SURGERY

## 2024-08-27 PROCEDURE — 27201423 OPTIME MED/SURG SUP & DEVICES STERILE SUPPLY: Performed by: ORTHOPAEDIC SURGERY

## 2024-08-27 PROCEDURE — 71000015 HC POSTOP RECOV 1ST HR: Performed by: ORTHOPAEDIC SURGERY

## 2024-08-27 PROCEDURE — 37000009 HC ANESTHESIA EA ADD 15 MINS: Performed by: ORTHOPAEDIC SURGERY

## 2024-08-27 PROCEDURE — 15734 MUSCLE-SKIN GRAFT TRUNK: CPT | Mod: 62,51,, | Performed by: ORTHOPAEDIC SURGERY

## 2024-08-27 PROCEDURE — 15734 MUSCLE-SKIN GRAFT TRUNK: CPT | Mod: 62,51,, | Performed by: STUDENT IN AN ORGANIZED HEALTH CARE EDUCATION/TRAINING PROGRAM

## 2024-08-27 PROCEDURE — 20936 SP BONE AGRFT LOCAL ADD-ON: CPT | Mod: ,,, | Performed by: ORTHOPAEDIC SURGERY

## 2024-08-27 PROCEDURE — C1713 ANCHOR/SCREW BN/BN,TIS/BN: HCPCS | Performed by: ORTHOPAEDIC SURGERY

## 2024-08-27 PROCEDURE — 36000710: Performed by: ORTHOPAEDIC SURGERY

## 2024-08-27 PROCEDURE — 25000003 PHARM REV CODE 250: Performed by: STUDENT IN AN ORGANIZED HEALTH CARE EDUCATION/TRAINING PROGRAM

## 2024-08-27 PROCEDURE — 61783 SCAN PROC SPINAL: CPT | Mod: 59,,, | Performed by: ORTHOPAEDIC SURGERY

## 2024-08-27 PROCEDURE — 71000033 HC RECOVERY, INTIAL HOUR: Performed by: ORTHOPAEDIC SURGERY

## 2024-08-27 PROCEDURE — 22853 INSJ BIOMECHANICAL DEVICE: CPT | Mod: 82,,, | Performed by: STUDENT IN AN ORGANIZED HEALTH CARE EDUCATION/TRAINING PROGRAM

## 2024-08-27 PROCEDURE — 27800903 OPTIME MED/SURG SUP & DEVICES OTHER IMPLANTS: Performed by: ORTHOPAEDIC SURGERY

## 2024-08-27 PROCEDURE — 22633 ARTHRD CMBN 1NTRSPC LUMBAR: CPT | Mod: 62,22,, | Performed by: ORTHOPAEDIC SURGERY

## 2024-08-27 PROCEDURE — 94761 N-INVAS EAR/PLS OXIMETRY MLT: CPT

## 2024-08-27 PROCEDURE — 25000003 PHARM REV CODE 250: Performed by: ORTHOPAEDIC SURGERY

## 2024-08-27 PROCEDURE — 82962 GLUCOSE BLOOD TEST: CPT | Performed by: ORTHOPAEDIC SURGERY

## 2024-08-27 PROCEDURE — 63052 LAM FACETC/FRMT ARTHRD LUM 1: CPT | Mod: 62,,, | Performed by: STUDENT IN AN ORGANIZED HEALTH CARE EDUCATION/TRAINING PROGRAM

## 2024-08-27 PROCEDURE — 22840 INSERT SPINE FIXATION DEVICE: CPT | Mod: ,,, | Performed by: ORTHOPAEDIC SURGERY

## 2024-08-27 PROCEDURE — 82962 GLUCOSE BLOOD TEST: CPT | Mod: 91 | Performed by: ORTHOPAEDIC SURGERY

## 2024-08-27 PROCEDURE — 63600175 PHARM REV CODE 636 W HCPCS: Performed by: STUDENT IN AN ORGANIZED HEALTH CARE EDUCATION/TRAINING PROGRAM

## 2024-08-27 PROCEDURE — 99900035 HC TECH TIME PER 15 MIN (STAT)

## 2024-08-27 PROCEDURE — 37000008 HC ANESTHESIA 1ST 15 MINUTES: Performed by: ORTHOPAEDIC SURGERY

## 2024-08-27 PROCEDURE — 71000016 HC POSTOP RECOV ADDL HR: Performed by: ORTHOPAEDIC SURGERY

## 2024-08-27 DEVICE — IMPLANTABLE DEVICE: Type: IMPLANTABLE DEVICE | Site: SPINE LUMBAR | Status: FUNCTIONAL

## 2024-08-27 DEVICE — PUTTY OSTEOSELECT DBM SYR 5CC: Type: IMPLANTABLE DEVICE | Site: BACK | Status: FUNCTIONAL

## 2024-08-27 DEVICE — CAP SPINAL LOCK THRD CREO 5.5: Type: IMPLANTABLE DEVICE | Site: SPINE LUMBAR | Status: FUNCTIONAL

## 2024-08-27 DEVICE — SCREW BONE SPINAL 6.5X40MM: Type: IMPLANTABLE DEVICE | Site: SPINE LUMBAR | Status: FUNCTIONAL

## 2024-08-27 RX ORDER — HYDROMORPHONE HYDROCHLORIDE 1 MG/ML
0.2 INJECTION, SOLUTION INTRAMUSCULAR; INTRAVENOUS; SUBCUTANEOUS EVERY 5 MIN PRN
Status: COMPLETED | OUTPATIENT
Start: 2024-08-27 | End: 2024-08-27

## 2024-08-27 RX ORDER — IBUPROFEN 200 MG
24 TABLET ORAL
Status: DISCONTINUED | OUTPATIENT
Start: 2024-08-27 | End: 2024-08-29 | Stop reason: HOSPADM

## 2024-08-27 RX ORDER — PROPOFOL 10 MG/ML
VIAL (ML) INTRAVENOUS
Status: DISCONTINUED | OUTPATIENT
Start: 2024-08-27 | End: 2024-08-27

## 2024-08-27 RX ORDER — GLUCAGON 1 MG
1 KIT INJECTION
Status: DISCONTINUED | OUTPATIENT
Start: 2024-08-27 | End: 2024-08-27 | Stop reason: HOSPADM

## 2024-08-27 RX ORDER — KETAMINE HCL IN 0.9 % NACL 50 MG/5 ML
SYRINGE (ML) INTRAVENOUS
Status: DISCONTINUED | OUTPATIENT
Start: 2024-08-27 | End: 2024-08-27

## 2024-08-27 RX ORDER — HEPARIN SODIUM 5000 [USP'U]/ML
5000 INJECTION, SOLUTION INTRAVENOUS; SUBCUTANEOUS EVERY 8 HOURS
Status: DISCONTINUED | OUTPATIENT
Start: 2024-08-27 | End: 2024-08-29 | Stop reason: HOSPADM

## 2024-08-27 RX ORDER — SUCCINYLCHOLINE CHLORIDE 20 MG/ML
INJECTION INTRAMUSCULAR; INTRAVENOUS
Status: DISCONTINUED | OUTPATIENT
Start: 2024-08-27 | End: 2024-08-27

## 2024-08-27 RX ORDER — METHOCARBAMOL 750 MG/1
750 TABLET, FILM COATED ORAL 3 TIMES DAILY
Status: DISCONTINUED | OUTPATIENT
Start: 2024-08-27 | End: 2024-08-29 | Stop reason: HOSPADM

## 2024-08-27 RX ORDER — ROCURONIUM BROMIDE 10 MG/ML
INJECTION, SOLUTION INTRAVENOUS
Status: DISCONTINUED | OUTPATIENT
Start: 2024-08-27 | End: 2024-08-27

## 2024-08-27 RX ORDER — HYDROCHLOROTHIAZIDE 12.5 MG/1
12.5 TABLET ORAL DAILY
Status: DISCONTINUED | OUTPATIENT
Start: 2024-08-27 | End: 2024-08-29 | Stop reason: HOSPADM

## 2024-08-27 RX ORDER — ACETAMINOPHEN 500 MG
1000 TABLET ORAL EVERY 8 HOURS
Status: DISCONTINUED | OUTPATIENT
Start: 2024-08-27 | End: 2024-08-29 | Stop reason: HOSPADM

## 2024-08-27 RX ORDER — LIDOCAINE HYDROCHLORIDE AND EPINEPHRINE 10; 10 MG/ML; UG/ML
INJECTION, SOLUTION INFILTRATION; PERINEURAL
Status: DISCONTINUED | OUTPATIENT
Start: 2024-08-27 | End: 2024-08-27 | Stop reason: HOSPADM

## 2024-08-27 RX ORDER — FENTANYL CITRATE 50 UG/ML
INJECTION, SOLUTION INTRAMUSCULAR; INTRAVENOUS
Status: DISCONTINUED | OUTPATIENT
Start: 2024-08-27 | End: 2024-08-27

## 2024-08-27 RX ORDER — OXYCODONE HYDROCHLORIDE 10 MG/1
10 TABLET ORAL EVERY 4 HOURS PRN
Status: DISCONTINUED | OUTPATIENT
Start: 2024-08-27 | End: 2024-08-29 | Stop reason: HOSPADM

## 2024-08-27 RX ORDER — INSULIN GLARGINE 100 [IU]/ML
15 INJECTION, SOLUTION SUBCUTANEOUS DAILY
Status: DISCONTINUED | OUTPATIENT
Start: 2024-08-28 | End: 2024-08-29 | Stop reason: HOSPADM

## 2024-08-27 RX ORDER — AMLODIPINE BESYLATE 5 MG/1
10 TABLET ORAL DAILY
Status: DISCONTINUED | OUTPATIENT
Start: 2024-08-28 | End: 2024-08-29 | Stop reason: HOSPADM

## 2024-08-27 RX ORDER — SODIUM CHLORIDE 0.9 % (FLUSH) 0.9 %
10 SYRINGE (ML) INJECTION
Status: DISCONTINUED | OUTPATIENT
Start: 2024-08-27 | End: 2024-08-27 | Stop reason: HOSPADM

## 2024-08-27 RX ORDER — LIDOCAINE HYDROCHLORIDE 10 MG/ML
1 INJECTION, SOLUTION EPIDURAL; INFILTRATION; INTRACAUDAL; PERINEURAL ONCE AS NEEDED
Status: COMPLETED | OUTPATIENT
Start: 2024-08-27 | End: 2024-08-27

## 2024-08-27 RX ORDER — LOSARTAN POTASSIUM 25 MG/1
25 TABLET ORAL DAILY
Status: DISCONTINUED | OUTPATIENT
Start: 2024-08-27 | End: 2024-08-29 | Stop reason: HOSPADM

## 2024-08-27 RX ORDER — SODIUM CHLORIDE 9 MG/ML
INJECTION, SOLUTION INTRAVENOUS CONTINUOUS
Status: DISCONTINUED | OUTPATIENT
Start: 2024-08-27 | End: 2024-08-29 | Stop reason: HOSPADM

## 2024-08-27 RX ORDER — PHENYLEPHRINE HYDROCHLORIDE 10 MG/ML
INJECTION INTRAVENOUS
Status: DISCONTINUED | OUTPATIENT
Start: 2024-08-27 | End: 2024-08-27

## 2024-08-27 RX ORDER — CELECOXIB 200 MG/1
200 CAPSULE ORAL DAILY
Status: DISCONTINUED | OUTPATIENT
Start: 2024-08-27 | End: 2024-08-29 | Stop reason: HOSPADM

## 2024-08-27 RX ORDER — PREGABALIN 75 MG/1
75 CAPSULE ORAL 2 TIMES DAILY
Status: DISCONTINUED | OUTPATIENT
Start: 2024-08-27 | End: 2024-08-27

## 2024-08-27 RX ORDER — CHOLECALCIFEROL (VITAMIN D3) 25 MCG
1000 TABLET ORAL DAILY
Status: DISCONTINUED | OUTPATIENT
Start: 2024-08-27 | End: 2024-08-29 | Stop reason: HOSPADM

## 2024-08-27 RX ORDER — PREGABALIN 150 MG/1
150 CAPSULE ORAL NIGHTLY
Status: DISCONTINUED | OUTPATIENT
Start: 2024-08-27 | End: 2024-08-29 | Stop reason: HOSPADM

## 2024-08-27 RX ORDER — VANCOMYCIN HYDROCHLORIDE 1 G/20ML
INJECTION, POWDER, LYOPHILIZED, FOR SOLUTION INTRAVENOUS
Status: DISCONTINUED | OUTPATIENT
Start: 2024-08-27 | End: 2024-08-27 | Stop reason: HOSPADM

## 2024-08-27 RX ORDER — CALCIUM CARBONATE 200(500)MG
1000 TABLET,CHEWABLE ORAL DAILY
Status: DISCONTINUED | OUTPATIENT
Start: 2024-08-27 | End: 2024-08-29 | Stop reason: HOSPADM

## 2024-08-27 RX ORDER — IBUPROFEN 200 MG
16 TABLET ORAL
Status: DISCONTINUED | OUTPATIENT
Start: 2024-08-27 | End: 2024-08-29 | Stop reason: HOSPADM

## 2024-08-27 RX ORDER — MUPIROCIN 20 MG/G
OINTMENT TOPICAL
Status: DISCONTINUED | OUTPATIENT
Start: 2024-08-27 | End: 2024-08-27 | Stop reason: HOSPADM

## 2024-08-27 RX ORDER — LIDOCAINE HYDROCHLORIDE 20 MG/ML
INJECTION, SOLUTION EPIDURAL; INFILTRATION; INTRACAUDAL; PERINEURAL
Status: DISCONTINUED | OUTPATIENT
Start: 2024-08-27 | End: 2024-08-27

## 2024-08-27 RX ORDER — METHOCARBAMOL 750 MG/1
750 TABLET, FILM COATED ORAL 3 TIMES DAILY
Status: DISCONTINUED | OUTPATIENT
Start: 2024-08-27 | End: 2024-08-27

## 2024-08-27 RX ORDER — INSULIN ASPART 100 [IU]/ML
0-10 INJECTION, SOLUTION INTRAVENOUS; SUBCUTANEOUS
Status: DISCONTINUED | OUTPATIENT
Start: 2024-08-27 | End: 2024-08-29 | Stop reason: HOSPADM

## 2024-08-27 RX ORDER — GLUCAGON 1 MG
1 KIT INJECTION
Status: DISCONTINUED | OUTPATIENT
Start: 2024-08-27 | End: 2024-08-29 | Stop reason: HOSPADM

## 2024-08-27 RX ORDER — HYDROMORPHONE HYDROCHLORIDE 1 MG/ML
INJECTION, SOLUTION INTRAMUSCULAR; INTRAVENOUS; SUBCUTANEOUS
Status: DISCONTINUED | OUTPATIENT
Start: 2024-08-27 | End: 2024-08-27

## 2024-08-27 RX ORDER — MIDAZOLAM HYDROCHLORIDE 1 MG/ML
INJECTION INTRAMUSCULAR; INTRAVENOUS
Status: DISCONTINUED | OUTPATIENT
Start: 2024-08-27 | End: 2024-08-27

## 2024-08-27 RX ORDER — AMOXICILLIN 250 MG
1 CAPSULE ORAL 2 TIMES DAILY
Status: DISCONTINUED | OUTPATIENT
Start: 2024-08-27 | End: 2024-08-29 | Stop reason: HOSPADM

## 2024-08-27 RX ORDER — DILTIAZEM HYDROCHLORIDE 180 MG/1
180 CAPSULE, COATED, EXTENDED RELEASE ORAL DAILY
Status: DISCONTINUED | OUTPATIENT
Start: 2024-08-28 | End: 2024-08-29 | Stop reason: HOSPADM

## 2024-08-27 RX ORDER — MORPHINE SULFATE 2 MG/ML
2 INJECTION, SOLUTION INTRAMUSCULAR; INTRAVENOUS
Status: DISCONTINUED | OUTPATIENT
Start: 2024-08-27 | End: 2024-08-29 | Stop reason: HOSPADM

## 2024-08-27 RX ORDER — HALOPERIDOL 5 MG/ML
0.5 INJECTION INTRAMUSCULAR EVERY 10 MIN PRN
Status: DISCONTINUED | OUTPATIENT
Start: 2024-08-27 | End: 2024-08-27 | Stop reason: HOSPADM

## 2024-08-27 RX ORDER — AMITRIPTYLINE HYDROCHLORIDE 10 MG/1
10 TABLET, FILM COATED ORAL NIGHTLY
Status: DISCONTINUED | OUTPATIENT
Start: 2024-08-27 | End: 2024-08-29 | Stop reason: HOSPADM

## 2024-08-27 RX ORDER — ATORVASTATIN CALCIUM 40 MG/1
40 TABLET, FILM COATED ORAL NIGHTLY
Status: DISCONTINUED | OUTPATIENT
Start: 2024-08-27 | End: 2024-08-29 | Stop reason: HOSPADM

## 2024-08-27 RX ORDER — OXYCODONE HYDROCHLORIDE 5 MG/1
5 TABLET ORAL EVERY 4 HOURS PRN
Status: DISCONTINUED | OUTPATIENT
Start: 2024-08-27 | End: 2024-08-29 | Stop reason: HOSPADM

## 2024-08-27 RX ORDER — ONDANSETRON 4 MG/1
4 TABLET, FILM COATED ORAL EVERY 6 HOURS PRN
Status: DISCONTINUED | OUTPATIENT
Start: 2024-08-27 | End: 2024-08-29 | Stop reason: HOSPADM

## 2024-08-27 RX ADMIN — OXYCODONE HYDROCHLORIDE 10 MG: 10 TABLET ORAL at 10:08

## 2024-08-27 RX ADMIN — HYDROMORPHONE HYDROCHLORIDE 0.2 MG: 1 INJECTION, SOLUTION INTRAMUSCULAR; INTRAVENOUS; SUBCUTANEOUS at 10:08

## 2024-08-27 RX ADMIN — PHENYLEPHRINE HYDROCHLORIDE 0.5 MCG/KG/MIN: 10 INJECTION INTRAVENOUS at 07:08

## 2024-08-27 RX ADMIN — AMITRIPTYLINE HYDROCHLORIDE 10 MG: 10 TABLET, FILM COATED ORAL at 09:08

## 2024-08-27 RX ADMIN — ATORVASTATIN CALCIUM 40 MG: 40 TABLET, FILM COATED ORAL at 09:08

## 2024-08-27 RX ADMIN — HEPARIN SODIUM 5000 UNITS: 5000 INJECTION INTRAVENOUS; SUBCUTANEOUS at 02:08

## 2024-08-27 RX ADMIN — SODIUM CHLORIDE 0.2 MCG/KG/MIN: 9 INJECTION, SOLUTION INTRAVENOUS at 07:08

## 2024-08-27 RX ADMIN — SUGAMMADEX 200 MG: 100 INJECTION, SOLUTION INTRAVENOUS at 08:08

## 2024-08-27 RX ADMIN — SENNOSIDES AND DOCUSATE SODIUM 1 TABLET: 50; 8.6 TABLET ORAL at 10:08

## 2024-08-27 RX ADMIN — METHOCARBAMOL 750 MG: 750 TABLET ORAL at 09:08

## 2024-08-27 RX ADMIN — CEFAZOLIN 2 G: 2 INJECTION, POWDER, FOR SOLUTION INTRAMUSCULAR; INTRAVENOUS at 11:08

## 2024-08-27 RX ADMIN — CEFAZOLIN 2 G: 2 INJECTION, POWDER, FOR SOLUTION INTRAMUSCULAR; INTRAVENOUS at 04:08

## 2024-08-27 RX ADMIN — LOSARTAN POTASSIUM 25 MG: 25 TABLET, FILM COATED ORAL at 11:08

## 2024-08-27 RX ADMIN — ROCURONIUM BROMIDE 30 MG: 10 INJECTION INTRAVENOUS at 07:08

## 2024-08-27 RX ADMIN — Medication 30 MG: at 07:08

## 2024-08-27 RX ADMIN — HEPARIN SODIUM 5000 UNITS: 5000 INJECTION INTRAVENOUS; SUBCUTANEOUS at 09:08

## 2024-08-27 RX ADMIN — SUCCINYLCHOLINE CHLORIDE 100 MG: 20 INJECTION, SOLUTION INTRAMUSCULAR; INTRAVENOUS; PARENTERAL at 07:08

## 2024-08-27 RX ADMIN — ROCURONIUM BROMIDE 5 MG: 10 INJECTION INTRAVENOUS at 07:08

## 2024-08-27 RX ADMIN — METHOCARBAMOL 750 MG: 750 TABLET ORAL at 10:08

## 2024-08-27 RX ADMIN — PHENYLEPHRINE HYDROCHLORIDE 200 MCG: 10 INJECTION INTRAVENOUS at 07:08

## 2024-08-27 RX ADMIN — SODIUM CHLORIDE: 9 INJECTION, SOLUTION INTRAVENOUS at 07:08

## 2024-08-27 RX ADMIN — PROPOFOL 50 MG: 10 INJECTION, EMULSION INTRAVENOUS at 07:08

## 2024-08-27 RX ADMIN — CHOLECALCIFEROL TAB 25 MCG (1000 UNIT) 1000 UNITS: 25 TAB at 10:08

## 2024-08-27 RX ADMIN — SODIUM CHLORIDE: 0.9 INJECTION, SOLUTION INTRAVENOUS at 07:08

## 2024-08-27 RX ADMIN — SENNOSIDES AND DOCUSATE SODIUM 1 TABLET: 50; 8.6 TABLET ORAL at 09:08

## 2024-08-27 RX ADMIN — LIDOCAINE HYDROCHLORIDE 80 MG: 20 INJECTION, SOLUTION EPIDURAL; INFILTRATION; INTRACAUDAL at 07:08

## 2024-08-27 RX ADMIN — HYDROCHLOROTHIAZIDE 12.5 MG: 12.5 TABLET ORAL at 10:08

## 2024-08-27 RX ADMIN — METHOCARBAMOL 750 MG: 750 TABLET ORAL at 03:08

## 2024-08-27 RX ADMIN — FENTANYL CITRATE 100 MCG: 50 INJECTION, SOLUTION INTRAMUSCULAR; INTRAVENOUS at 07:08

## 2024-08-27 RX ADMIN — CELECOXIB 200 MG: 200 CAPSULE ORAL at 10:08

## 2024-08-27 RX ADMIN — PHENYLEPHRINE HYDROCHLORIDE 100 MCG: 10 INJECTION INTRAVENOUS at 08:08

## 2024-08-27 RX ADMIN — PHENYLEPHRINE HYDROCHLORIDE 300 MCG: 10 INJECTION INTRAVENOUS at 07:08

## 2024-08-27 RX ADMIN — PHENYLEPHRINE HYDROCHLORIDE 250 MCG: 10 INJECTION INTRAVENOUS at 07:08

## 2024-08-27 RX ADMIN — ONDANSETRON HYDROCHLORIDE 4 MG: 4 TABLET, FILM COATED ORAL at 04:08

## 2024-08-27 RX ADMIN — CEFAZOLIN 2 G: 2 INJECTION, POWDER, FOR SOLUTION INTRAMUSCULAR; INTRAVENOUS at 07:08

## 2024-08-27 RX ADMIN — MIDAZOLAM 2 MG: 1 INJECTION INTRAMUSCULAR; INTRAVENOUS at 07:08

## 2024-08-27 RX ADMIN — HYDROMORPHONE HYDROCHLORIDE 0.2 MG: 1 INJECTION, SOLUTION INTRAMUSCULAR; INTRAVENOUS; SUBCUTANEOUS at 09:08

## 2024-08-27 RX ADMIN — SODIUM CHLORIDE: 9 INJECTION, SOLUTION INTRAVENOUS at 10:08

## 2024-08-27 RX ADMIN — PROPOFOL 150 MG: 10 INJECTION, EMULSION INTRAVENOUS at 07:08

## 2024-08-27 RX ADMIN — SODIUM CHLORIDE: 9 INJECTION, SOLUTION INTRAVENOUS at 06:08

## 2024-08-27 RX ADMIN — GLYCOPYRROLATE 0.2 MG: 0.2 INJECTION, SOLUTION INTRAMUSCULAR; INTRAVENOUS at 08:08

## 2024-08-27 RX ADMIN — SODIUM CHLORIDE, SODIUM GLUCONATE, SODIUM ACETATE, POTASSIUM CHLORIDE, MAGNESIUM CHLORIDE, SODIUM PHOSPHATE, DIBASIC, AND POTASSIUM PHOSPHATE: .53; .5; .37; .037; .03; .012; .00082 INJECTION, SOLUTION INTRAVENOUS at 07:08

## 2024-08-27 RX ADMIN — PREGABALIN 150 MG: 150 CAPSULE ORAL at 09:08

## 2024-08-27 RX ADMIN — ACETAMINOPHEN 1000 MG: 500 TABLET ORAL at 09:08

## 2024-08-27 RX ADMIN — CALCIUM CARBONATE (ANTACID) CHEW TAB 500 MG 1000 MG: 500 CHEW TAB at 10:08

## 2024-08-27 RX ADMIN — ACETAMINOPHEN 1000 MG: 500 TABLET ORAL at 02:08

## 2024-08-27 RX ADMIN — LIDOCAINE HYDROCHLORIDE 10 MG: 10 INJECTION, SOLUTION EPIDURAL; INFILTRATION; INTRACAUDAL; PERINEURAL at 06:08

## 2024-08-27 RX ADMIN — OXYCODONE HYDROCHLORIDE 10 MG: 10 TABLET ORAL at 09:08

## 2024-08-27 RX ADMIN — OXYCODONE HYDROCHLORIDE 10 MG: 10 TABLET ORAL at 02:08

## 2024-08-27 RX ADMIN — HYDROMORPHONE HYDROCHLORIDE 0.3 MG: 1 INJECTION, SOLUTION INTRAMUSCULAR; INTRAVENOUS; SUBCUTANEOUS at 09:08

## 2024-08-27 RX ADMIN — HYDROMORPHONE HYDROCHLORIDE 0.2 MG: 1 INJECTION, SOLUTION INTRAMUSCULAR; INTRAVENOUS; SUBCUTANEOUS at 11:08

## 2024-08-27 NOTE — OP NOTE
DATE OF PROCEDURE: 8/27/2024.     PRIMARY SURGEON: Suresh Yeh M.D.     CO-SURGEON: DO SIA Stockton     FIRST ASSISTANT: Tl Posey MD, PGY-3 Orthopedics     PREOPERATIVE DIAGNOSIS:   1.   L4-5 DDD  2.   Symptomatic lumbar spinal stenosis lumbar radiculopathy  3.   History of L4-5 laminectomy     POSTOPERATIVE DIAGNOSIS:   Same     PROCEDURES PERFORMED:  Posterior lateral and posterior lumbar interbody fusion L4-5  2.   Posterior nonsegmental instrumentation L4-5  3.   Revision L4 laminectomy, and bilateral foraminotomy for decompression of central and bilateral foraminal stenosis.  4.   Application of titanium intervertebral spacer device to L4-5 disc defect  5.   Robotic assistance for placement of the pedicle screws (requiring greater than 30 minutes of preoperative planning time)  6.   Auto and allografting  7.   Bilateral paraspinal advancement flap 8 cm x 3 cm      ANESTHESIA: General endotracheal anesthesia.     ESTIMATED BLOOD LOSS: 100 mL.     IMPLANTS: Globus screws, and expandable 7-13 mm height (15deg lordosis, 22mm length) titanium cage.  DBM putty     SPECIMENS: None.     FINDINGS: severe L4-5 stenosis.     DRAINS: One deep and one superficial HV drains     COMPLICATIONS: None.     SPONGE AND NEEDLE COUNT: Correct x2.     NEUROLOGICAL MONITORING: Somatosensory potentials, free run EMG, and EMG stimulation lumbar pedicle screws. There were no changes to SSEP baselines. There no significant EMG activity. All screws stimulated at or greater than than 20 milliamps.     REASON FOR OPERATION AND BRIEF HISTORY AND PHYSICAL: Aly Black is a 74 y.o. male with a hx of L4-5 laminectomy presented with an L4-5 refractory lumbar spinal stenosis with radiculopathy. He has failed extensive conservative management and is here for surgery today.     DESCRIPTION OF INFORMED CONSENT: Please see my last clinic note for a full description of the informed consent I had with the patient.      DESCRIPTION OF PROCEDURE: The patient was met in the preoperative area where all final questions were answered. Their lumbar spine was marked as the operative site. The patient was then brought to the operating room where anesthesia was induced. The patient was then flipped prone onto the [place] spine table. All bony prominences were padded, paying special attention to the eyes, nose and mouth, axillae, ulnar nerve and hands, genitalia, knees and feet, this was confirmed to be adequate with the anesthesia team. Sequential compression devices were run throughout the case on the bilateral calves. The surgical field was prepped and draped in normal sterile manner after using fluoroscopy to localize our incisoin. A full time-out was then called, verifying patient's identity, surgery, site, and that they had been administered a weight appropriate dose of Ancef, no specific nursing, anesthetic or surgical concerns were identified and it was decided that it was safe to proceed with surgery. I informed all members of the operative team that they were empowered to speak up regarding concerns at any time during the procedure.     We extended the previous midline incision and carried dissection down through the skin and subcutaneous tissues using combination of sharp dissection and electrocautery where necessary. The dorsal fascia of the lumbar spine was identified and incised in the midline and we performed a preliminary subperiosteal exposure of the bilateral L3-4 and L4-5 facet joints, and what I took to be the L4-5 transverse processes bilaterally. At the conclusion our preliminary exposure, we placed a marker on what we took to be the right L5 pedicle, took a lateral radiograph, and this confirmed my levels. Next we finalized my exposure. Subsequently we performed bilateral L4-5 facetectomies with an osteotome.      We placed a pin in the right posterior superior iliac spine for the robotic array. AP and lateral  fluoroscopy images were taken to register to the preoperative CT scan and plan that we had performed prior to the incision.  Once that was done, the robotic arm was used to cannulate pedicle screw tracts bilaterally at L4-5. Xrays confirmed adequate positioning of the screws on AP and lateral images.     We then began our neurological decompression. High-speed drill was used to thin the L4 lamina at the level of the pars. This was then removed on block, and I released the ligamentum flavum centrally with a forward angle curette and resected it with a Kerrison punch. There was a lot of dural adhesion, so care was taken to avoid a durotomy. We subsequently performed a central as well as bilateral foraminal decompression with a Kerrison punch. Working on the right of midline we used a osteotome to remove the superior articular process of L5. We then performed a subtotal L4-5 diskectomy in a standard fashion. The disc space was then thoroughly irrigated and a expandable 7-13 titanium spacer was impacted, and expanded, and confirmed to be at the correct level in adequate position radiographically. The disc space then packed with morselized bone graft.     The wound was then copiously irrigated. Rods were measured, cut, contoured, and locked into place with torque wrench. Morselized bone graft, mixed with 1 g vancomycin powder, was laid dorsally along the decorticated transverse processes from L4-5.     500 mg of vancomycin powder was placed in the deep space, and a deep drain was then placed. Bilateral paraspinal muscles were mobilized based on lateral perforators and advanced to obliterate the dead space at midline. The overlying fascia was then created 8 cm in length by 3 cm in width in order to create a tension free closure and to assist with wound healing. The deep fascia was then closed with #1 Vicryl sutures in an interrupted, figure-of-eight fashion. A superficial drain was then placed. The superficial layer was  then irrigated and closed over an additional 500 mg of vancomycin powder with 2-0 Vicryl, 3-0 Monocryl, and Dermabond. A soft dressing was then placed. The drains were secured in place with silk sutures. The patient was then flipped supine, extubated and transferred to the Recovery Room in stable condition.     JUSTIFICATION OF CO-SURGEON AND MODIFIER -22: This was a complex case in a patient with previous laminectomy and dural adhesions. Two attending surgeons were indicated to reduce operative times, blood loss, perform decompression, and improve patient outcomes.

## 2024-08-27 NOTE — OP NOTE
DATE OF PROCEDURE: 8/27/2024.     PRIMARY SURGEON: Suresh Yeh M.D.     CO-SURGEON: DO SIA Stockton    FIRST ASSISTANT: Tl Posey MD, PGY-3 Orthopedics     PREOPERATIVE DIAGNOSIS:   1.   L4-5 DDD  2.   Symptomatic lumbar spinal stenosis lumbar radiculopathy  3.   History of L4-5 laminectomy     POSTOPERATIVE DIAGNOSIS:   Same     PROCEDURES PERFORMED:  Posterior lateral and posterior lumbar interbody fusion L4-5  2.   Posterior nonsegmental instrumentation L4-5  3.   Revision L4 laminectomy, and bilateral foraminotomy for decompression of central and bilateral foraminal stenosis.  4.   Application of titanium intervertebral spacer device to L4-5 disc defect  5.   Robotic assistance for placement of the pedicle screws (requiring greater than 30 minutes of preoperative planning time)  6.   Auto and allografting  7.   Bilateral paraspinal advancement flap 8 cm x 3 cm      ANESTHESIA: General endotracheal anesthesia.     ESTIMATED BLOOD LOSS: 100 mL.     IMPLANTS: Globus screws, and expandable 7-13 mm height (15deg lordosis, 22mm length) titanium cage.  DBM putty     SPECIMENS: None.     FINDINGS: severe L4-5 stenosis.     DRAINS: One deep and one superficial HV drains     COMPLICATIONS: None.     SPONGE AND NEEDLE COUNT: Correct x2.     NEUROLOGICAL MONITORING: Somatosensory potentials, free run EMG, and EMG stimulation lumbar pedicle screws. There were no changes to SSEP baselines. There no significant EMG activity. All screws stimulated at or greater than than 20 milliamps.     REASON FOR OPERATION AND BRIEF HISTORY AND PHYSICAL: Aly Black is a 74 y.o. male with a hx of L4-5 laminectomy presented with an L4-5 refractory lumbar spinal stenosis with radiculopathy. He has failed extensive conservative management and is here for surgery today.     DESCRIPTION OF INFORMED CONSENT: Please see my last clinic note for a full description of the informed consent I had with the patient.     DESCRIPTION  OF PROCEDURE: The patient was met in the preoperative area where all final questions were answered. Their lumbar spine was marked as the operative site. The patient was then brought to the operating room where anesthesia was induced. The patient was then flipped prone onto the [place] spine table. All bony prominences were padded, paying special attention to the eyes, nose and mouth, axillae, ulnar nerve and hands, genitalia, knees and feet, this was confirmed to be adequate with the anesthesia team. Sequential compression devices were run throughout the case on the bilateral calves. The surgical field was prepped and draped in normal sterile manner after using fluoroscopy to localize our incisoin. A full time-out was then called, verifying patient's identity, surgery, site, and that they had been administered a weight appropriate dose of Ancef, no specific nursing, anesthetic or surgical concerns were identified and it was decided that it was safe to proceed with surgery. I informed all members of the operative team that they were empowered to speak up regarding concerns at any time during the procedure.     We extended the previous midline incision and carried dissection down through the skin and subcutaneous tissues using combination of sharp dissection and electrocautery where necessary. The dorsal fascia of the lumbar spine was identified and incised in the midline and we performed a preliminary subperiosteal exposure of the bilateral L3-4 and L4-5 facet joints, and what I took to be the L4-5 transverse processes bilaterally. At the conclusion our preliminary exposure, we placed a marker on what we took to be the right L5 pedicle, took a lateral radiograph, and this confirmed my levels. Next we finalized my exposure. Subsequently we performed bilateral L4-5 facetectomies with an osteotome.      We placed a pin in the right posterior superior iliac spine for the robotic array. AP and lateral fluoroscopy images  were taken to register to the preoperative CT scan and plan that we had performed prior to the incision.  Once that was done, the robotic arm was used to cannulate pedicle screw tracts bilaterally at L4-5. Xrays confirmed adequate positioning of the screws on AP and lateral images.     We then began our neurological decompression. High-speed drill was used to thin the L4 lamina at the level of the pars. This was then removed on block, and I released the ligamentum flavum centrally with a forward angle curette and resected it with a Kerrison punch. There was a lot of dural adhesion, so care was taken to avoid a durotomy. We subsequently performed a central as well as bilateral foraminal decompression with a Kerrison punch. Working on the right of midline we used a osteotome to remove the superior articular process of L5. We then performed a subtotal L4-5 diskectomy in a standard fashion. The disc space was then thoroughly irrigated and a expandable 7-13 titanium spacer was impacted, and expanded, and confirmed to be at the correct level in adequate position radiographically. The disc space then packed with morselized bone graft.     The wound was then copiously irrigated. Rods were measured, cut, contoured, and locked into place with torque wrench. Morselized bone graft, mixed with 1 g vancomycin powder, was laid dorsally along the decorticated transverse processes from L4-5.     500 mg of vancomycin powder was placed in the deep space, and a deep drain was then placed. Bilateral paraspinal muscles were mobilized based on lateral perforators and advanced to obliterate the dead space at midline. The overlying fascia was then created 8 cm in length by 3 cm in width in order to create a tension free closure and to assist with wound healing. The deep fascia was then closed with #1 Vicryl sutures in an interrupted, figure-of-eight fashion. A superficial drain was then placed. The superficial layer was then irrigated and  closed over an additional 500 mg of vancomycin powder with 2-0 Vicryl, 3-0 Monocryl, and Dermabond. A soft dressing was then placed. The drains were secured in place with silk sutures. The patient was then flipped supine, extubated and transferred to the Recovery Room in stable condition.     JUSTIFICATION OF CO-SURGEON AND MODIFIER -22: This was a complex case in a patient with previous laminectomy and dural adhesions. Two attending surgeons were indicated to reduce operative times, blood loss, perform decompression, and improve patient outcomes.    Suresh Yeh MD  Orthopaedic Spine Surgeon  Department of Orthopaedic Surgery  [phone]

## 2024-08-27 NOTE — NURSING TRANSFER
Nursing Transfer Note      8/27/2024   3:10 PM    Nurse giving handoff:YOKASTA Yoder RN   Nurse receiving handoff:ROBERTO Mays RN     Reason patient is being transferred: post procedure     Transfer To: 528    Transfer via bed    Transported by PCT     Any special needs or follow-up needed: routine     Chart send with patient: Yes    Notified: spouse    Patient reassessed at: 1500 on 8/27

## 2024-08-27 NOTE — H&P
"DATE: 8/27/2024  PATIENT: David Solomon    Attending Physician: Suresh Yeh M.D.    HISTORY:  David Solomon is a 74 y.o. male who returns to me today for FU. Patient was last seen on 2/20/24, and he had L4-5 decompression on 4/12/24 at [facility]. He continues to have LBP that radiates posterolaterally down RLE to the calf and down LLE to the knee. He had a repeat postop MRI. He has been taking meds without much relief. He is miserable and wants surgical intervention.    The patient does not smoke or endorse IVDU. He has type I DM (HA1C of 6.2 according to pt). The patient is not on any blood thinners and does not take chronic narcotics. 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    PMH/PSH/FamHx/SocHx:  Unchanged from prior visit    ROS:  Positive for LBP and BLE pain  Denies perineal paresthesias, bowel or bladder incontinence    EXAM:  BP (!) 162/72 (BP Location: Left arm, Patient Position: Lying)   Pulse 80   Resp 18   Ht 5' 7.5" (1.715 m)   Wt 74.7 kg (164 lb 10.9 oz)   SpO2 100%   BMI 25.41 kg/m²     My physical examination was notable for the following findings: motor intact BLE; SILT    IMAGING:  Today I independently reviewed the following images and my interpretations are as follows:    Previous L-spine XRs showed spondylosis and DDD without listhesis.    MRI lumbar ([facility], accessed online) showed R L4 laminotomy. Persistent L4-5 severe central and b/l foraminal stenosis.       ASSESSMENT/PLAN:  Patient has lumbar spondylosis and stenosis with BLE (R>L) radiculopathy, in the setting of prior L4-5 decompression. Will plan for a revision L4-5 PLDF/TLIF (R).    Suresh Yeh MD  Orthopaedic Spine Surgeon  Department of Orthopaedic Surgery  [phone]  "

## 2024-08-27 NOTE — DISCHARGE INSTRUCTIONS
DR. MARIFER BENÍTEZ'S POSTOPERATIVE INSTRUCTIONS -   LUMBAR FUSION       Antibiotics: You do not need additional antibiotics at home.    NSAIDs: Please refrain from taking ibuprofen (Advil), naproxen (Aleve), and other non steroidal anti-inflammatory medications other than the Celebrex that will be prescribed to you after surgery.    Wound Care: You may remove your dressing and shower 7 days after surgery. Until then please keep your wound clean and dry. Sponge baths are acceptable. Do not go in a pool or hot tub until seen in clinic. Please leave the small steri-strips covering your wound in place until they fall off naturally (2 weeks). You may notice clear suture ends hanging from the sides of your incision after the steri-strips are removed, it is ok to clip these with scissors.    Brace: You may be prescribed a brace, please wear this when up and walking, it is not necessary to wear at night when sleeping.    Pain: We will use a multimodal approach for pain management after your surgery.  You will be given a prescription for pain medicine when you are discharged from the hospital.  You will also be given prescriptions for Robaxin (a muscle relaxer), Gabapentin, Celebrex and Tylenol.  Please note: you will only be given ONE prescription for narcotics when you are discharged from the hospital.  This medication is for breakthrough pain only. This medication will not be refilled.  The other medications given to you may be refilled if needed.      Infection: Signs of infection include increasing wound drainage and redness around the wound, as well as a temperature over 101.5 degrees. It is unnecessary to take your temperature on a routine basis. Please call the below number if you are concerned about an infection.    Driving and Work: It is ok to return to driving and work as long as you are not taking narcotic pain medications and can walk greater than 100 feet. Please do not lift over 10 pounds or participate in  exercise or sports until cleared by Dr. Yeh.    Deep Venous Thrombosis (Blood Clots): Symptoms include swelling in the legs and shortness of breath. Please call the office or proceed to the nearest emergency room if you have any of these symptoms.    Physical Therapy: The best physical therapy immediately after surgery is walking. Please try to walk as much as possible.    Follow-up: You will be scheduled for a follow-up appointment in 4 weeks with either Dr. Yeh or his physician assistant, Gris Peter PA-C.    Questions: During business hours please call [phone] for routine questions. For after hours questions please call [phone] and ask to speak with the Orthopaedic resident on call.    Disability: If you submitted short term disability paperwork for us to complete and would like to check the status, please call the Disability Department at [phone].  You may fax any necessary paperwork to [phone].

## 2024-08-27 NOTE — TRANSFER OF CARE
"Anesthesia Transfer of Care Note    Patient: David Solomon    Procedure(s) Performed: Procedure(s) (LRB):  FUSION, SPINE, LUMBAR, TLIF, POSTERIOR APPROACH, USING PEDICLE SCREW L4-5 (L) (N/A)    Patient location: PACU    Anesthesia Type: general    Transport from OR: Transported from OR on 6-10 L/min O2 by face mask with adequate spontaneous ventilation    Post pain: adequate analgesia    Post assessment: no apparent anesthetic complications    Post vital signs: stable    Level of consciousness: awake and alert    Nausea/Vomiting: no nausea/vomiting    Complications: none    Transfer of care protocol was followed      Last vitals: Visit Vitals  BP (!) 162/72 (BP Location: Left arm, Patient Position: Lying)   Pulse 80   Temp 36.1 °C (97 °F) (Temporal)   Resp 18   Ht 5' 7.5" (1.715 m)   Wt 74.7 kg (164 lb 10.9 oz)   SpO2 100%   BMI 25.41 kg/m²     "

## 2024-08-27 NOTE — ANESTHESIA POSTPROCEDURE EVALUATION
Anesthesia Post Evaluation    Patient: David Solomon    Procedure(s) Performed: Procedure(s) (LRB):  FUSION, SPINE, LUMBAR, TLIF, POSTERIOR APPROACH, USING PEDICLE SCREW L4-5 (L) (N/A)    Final Anesthesia Type: general      Patient location during evaluation: [facility]  Patient participation: Yes- Able to Participate  Level of consciousness: awake and alert and oriented  Post-procedure vital signs: reviewed and stable  Pain management: adequate  Airway patency: patent  RUBÉN mitigation strategies: Multimodal analgesia, Extubation while patient is awake, Verification of full reversal of neuromuscular block and Extubation and recovery carried out in lateral, semiupright, or other nonsupine position  PONV status at discharge: No PONV  Anesthetic complications: no      Cardiovascular status: blood pressure returned to baseline and hemodynamically stable  Respiratory status: unassisted, spontaneous ventilation and room air  Hydration status: euvolemic  Follow-up not needed.              Vitals Value Taken Time   /65 08/27/24 1317   Temp 36.4 °C (97.6 °F) 08/27/24 1300   Pulse 71 08/27/24 1343   Resp 8 08/27/24 1343   SpO2 95 % 08/27/24 1343   Vitals shown include unfiled device data.      Event Time   Out of Recovery 10:00:00         Pain/Eduarda Score: Pain Rating Prior to Med Admin: 6 (8/27/2024 11:00 AM)  Pain Rating Post Med Admin: 5 (8/27/2024 11:15 AM)  Eduarda Score: 9 (8/27/2024 10:00 AM)

## 2024-08-27 NOTE — NURSING
Patient arrived  in unit in stretcher . VSS . AO*4 . Two  romovac present . Call light within reach , safety precaution maintained . Will continue monitoring .

## 2024-08-27 NOTE — NURSING
Nurses Note -- 4 Eyes      8/27/2024   6:45 PM      Skin assessed during: Q Shift Change      [x] No Altered Skin Integrity Present    []Prevention Measures Documented      [] Yes- Altered Skin Integrity Present or Discovered   [] LDA Added if Not in Epic (Describe Wound)   [] New Altered Skin Integrity was Present on Admit and Documented in LDA   [] Wound Image Taken    Wound Care Consulted? No    Attending Nurse:  Tabatha Rich RN/Staff Member:   FRANCK Spring

## 2024-08-27 NOTE — ANESTHESIA PROCEDURE NOTES
Intubation    Date/Time: 8/27/2024 7:11 AM    Performed by: Catarina Germain MD  Authorized by: David Chahal MD    Intubation:     Induction:  Intravenous    Intubated:  Postinduction    Mask Ventilation:  Easy mask    Attempts:  1    Attempted By:  Resident anesthesiologist    Method of Intubation:  Video laryngoscopy    Blade:  Mendiola 3    Laryngeal View Grade: Grade I - full view of cords      Difficult Airway Encountered?: No      Complications:  None    Airway Device:  Oral endotracheal tube    Airway Device Size:  7.0    Style/Cuff Inflation:  Cuffed (inflated to minimal occlusive pressure)    Secured at:  The lips    Placement Verified By:  Capnometry    Complicating Factors:  None    Findings Post-Intubation:  BS equal bilateral and atraumatic/condition of teeth unchanged

## 2024-08-28 PROBLEM — M54.16 LUMBAR RADICULOPATHY: Status: ACTIVE | Noted: 2024-08-28

## 2024-08-28 LAB
ALBUMIN SERPL BCP-MCNC: 3.2 G/DL (ref 3.5–5.2)
ALP SERPL-CCNC: 73 U/L (ref 55–135)
ALT SERPL W/O P-5'-P-CCNC: 20 U/L (ref 10–44)
ANION GAP SERPL CALC-SCNC: 7 MMOL/L (ref 8–16)
AST SERPL-CCNC: 27 U/L (ref 10–40)
BASOPHILS # BLD AUTO: 0.01 K/UL (ref 0–0.2)
BASOPHILS NFR BLD: 0.1 % (ref 0–1.9)
BILIRUB SERPL-MCNC: 0.8 MG/DL (ref 0.1–1)
BUN SERPL-MCNC: 22 MG/DL (ref 8–23)
CALCIUM SERPL-MCNC: 8.7 MG/DL (ref 8.7–10.5)
CHLORIDE SERPL-SCNC: 106 MMOL/L (ref 95–110)
CO2 SERPL-SCNC: 20 MMOL/L (ref 23–29)
CREAT SERPL-MCNC: 1.2 MG/DL (ref 0.5–1.4)
DIFFERENTIAL METHOD BLD: ABNORMAL
EOSINOPHIL # BLD AUTO: 0 K/UL (ref 0–0.5)
EOSINOPHIL NFR BLD: 0 % (ref 0–8)
ERYTHROCYTE [DISTWIDTH] IN BLOOD BY AUTOMATED COUNT: 12.1 % (ref 11.5–14.5)
EST. GFR  (NO RACE VARIABLE): >60 ML/MIN/1.73 M^2
GLUCOSE SERPL-MCNC: 192 MG/DL (ref 70–110)
HCT VFR BLD AUTO: 34.9 % (ref 40–54)
HGB BLD-MCNC: 12.3 G/DL (ref 14–18)
IMM GRANULOCYTES # BLD AUTO: 0.09 K/UL (ref 0–0.04)
IMM GRANULOCYTES NFR BLD AUTO: 0.6 % (ref 0–0.5)
LYMPHOCYTES # BLD AUTO: 1.3 K/UL (ref 1–4.8)
LYMPHOCYTES NFR BLD: 8.4 % (ref 18–48)
MCH RBC QN AUTO: 33.7 PG (ref 27–31)
MCHC RBC AUTO-ENTMCNC: 35.2 G/DL (ref 32–36)
MCV RBC AUTO: 96 FL (ref 82–98)
MONOCYTES # BLD AUTO: 1.6 K/UL (ref 0.3–1)
MONOCYTES NFR BLD: 10.7 % (ref 4–15)
NEUTROPHILS # BLD AUTO: 11.9 K/UL (ref 1.8–7.7)
NEUTROPHILS NFR BLD: 80.2 % (ref 38–73)
NRBC BLD-RTO: 0 /100 WBC
PLATELET # BLD AUTO: 277 K/UL (ref 150–450)
PMV BLD AUTO: 10.4 FL (ref 9.2–12.9)
POCT GLUCOSE: 160 MG/DL (ref 70–110)
POCT GLUCOSE: 192 MG/DL (ref 70–110)
POCT GLUCOSE: 226 MG/DL (ref 70–110)
POCT GLUCOSE: 95 MG/DL (ref 70–110)
POTASSIUM SERPL-SCNC: 4.6 MMOL/L (ref 3.5–5.1)
PROT SERPL-MCNC: 5.9 G/DL (ref 6–8.4)
RBC # BLD AUTO: 3.65 M/UL (ref 4.6–6.2)
SODIUM SERPL-SCNC: 133 MMOL/L (ref 136–145)
WBC # BLD AUTO: 14.82 K/UL (ref 3.9–12.7)

## 2024-08-28 PROCEDURE — 97161 PT EVAL LOW COMPLEX 20 MIN: CPT

## 2024-08-28 PROCEDURE — 80053 COMPREHEN METABOLIC PANEL: CPT

## 2024-08-28 PROCEDURE — 25000003 PHARM REV CODE 250

## 2024-08-28 PROCEDURE — 85025 COMPLETE CBC W/AUTO DIFF WBC: CPT

## 2024-08-28 PROCEDURE — 36415 COLL VENOUS BLD VENIPUNCTURE: CPT

## 2024-08-28 PROCEDURE — 97535 SELF CARE MNGMENT TRAINING: CPT

## 2024-08-28 PROCEDURE — 97165 OT EVAL LOW COMPLEX 30 MIN: CPT

## 2024-08-28 PROCEDURE — 97116 GAIT TRAINING THERAPY: CPT

## 2024-08-28 PROCEDURE — 63600175 PHARM REV CODE 636 W HCPCS

## 2024-08-28 RX ORDER — ACETAMINOPHEN 500 MG
1000 TABLET ORAL EVERY 8 HOURS
Qty: 180 TABLET | Refills: 0 | Status: SHIPPED | OUTPATIENT
Start: 2024-08-28 | End: 2024-09-28

## 2024-08-28 RX ORDER — OXYCODONE HYDROCHLORIDE 5 MG/1
5 TABLET ORAL EVERY 4 HOURS PRN
Qty: 28 TABLET | Refills: 0 | Status: SHIPPED | OUTPATIENT
Start: 2024-08-28

## 2024-08-28 RX ORDER — METHOCARBAMOL 500 MG/1
500 TABLET, FILM COATED ORAL 3 TIMES DAILY
Qty: 30 TABLET | Refills: 0 | Status: SHIPPED | OUTPATIENT
Start: 2024-08-28 | End: 2024-09-07

## 2024-08-28 RX ORDER — CELECOXIB 200 MG/1
200 CAPSULE ORAL DAILY
Qty: 30 CAPSULE | Refills: 0 | Status: SHIPPED | OUTPATIENT
Start: 2024-08-28

## 2024-08-28 RX ORDER — ONDANSETRON 4 MG/1
8 TABLET, ORALLY DISINTEGRATING ORAL EVERY 8 HOURS PRN
Qty: 30 TABLET | Refills: 0 | Status: SHIPPED | OUTPATIENT
Start: 2024-08-28

## 2024-08-28 RX ORDER — DOCUSATE SODIUM 100 MG/1
100 CAPSULE, LIQUID FILLED ORAL 2 TIMES DAILY PRN
Qty: 30 CAPSULE | Refills: 0 | Status: SHIPPED | OUTPATIENT
Start: 2024-08-28

## 2024-08-28 RX ADMIN — INSULIN ASPART 2 UNITS: 100 INJECTION, SOLUTION INTRAVENOUS; SUBCUTANEOUS at 04:08

## 2024-08-28 RX ADMIN — ACETAMINOPHEN 1000 MG: 500 TABLET ORAL at 02:08

## 2024-08-28 RX ADMIN — AMLODIPINE BESYLATE 10 MG: 5 TABLET ORAL at 09:08

## 2024-08-28 RX ADMIN — INSULIN ASPART 4 UNITS: 100 INJECTION, SOLUTION INTRAVENOUS; SUBCUTANEOUS at 08:08

## 2024-08-28 RX ADMIN — HEPARIN SODIUM 5000 UNITS: 5000 INJECTION INTRAVENOUS; SUBCUTANEOUS at 09:08

## 2024-08-28 RX ADMIN — SENNOSIDES AND DOCUSATE SODIUM 1 TABLET: 50; 8.6 TABLET ORAL at 09:08

## 2024-08-28 RX ADMIN — OXYCODONE HYDROCHLORIDE 10 MG: 10 TABLET ORAL at 03:08

## 2024-08-28 RX ADMIN — SENNOSIDES AND DOCUSATE SODIUM 1 TABLET: 50; 8.6 TABLET ORAL at 08:08

## 2024-08-28 RX ADMIN — CALCIUM CARBONATE (ANTACID) CHEW TAB 500 MG 1000 MG: 500 CHEW TAB at 09:08

## 2024-08-28 RX ADMIN — SODIUM CHLORIDE: 9 INJECTION, SOLUTION INTRAVENOUS at 03:08

## 2024-08-28 RX ADMIN — HYDROCHLOROTHIAZIDE 12.5 MG: 12.5 TABLET ORAL at 09:08

## 2024-08-28 RX ADMIN — HEPARIN SODIUM 5000 UNITS: 5000 INJECTION INTRAVENOUS; SUBCUTANEOUS at 05:08

## 2024-08-28 RX ADMIN — INSULIN GLARGINE 15 UNITS: 100 INJECTION, SOLUTION SUBCUTANEOUS at 08:08

## 2024-08-28 RX ADMIN — OXYCODONE HYDROCHLORIDE 5 MG: 5 TABLET ORAL at 02:08

## 2024-08-28 RX ADMIN — INSULIN ASPART 4 UNITS: 100 INJECTION, SOLUTION INTRAVENOUS; SUBCUTANEOUS at 11:08

## 2024-08-28 RX ADMIN — CELECOXIB 200 MG: 200 CAPSULE ORAL at 09:08

## 2024-08-28 RX ADMIN — CHOLECALCIFEROL TAB 25 MCG (1000 UNIT) 1000 UNITS: 25 TAB at 09:08

## 2024-08-28 RX ADMIN — HEPARIN SODIUM 5000 UNITS: 5000 INJECTION INTRAVENOUS; SUBCUTANEOUS at 02:08

## 2024-08-28 RX ADMIN — PREGABALIN 150 MG: 150 CAPSULE ORAL at 08:08

## 2024-08-28 RX ADMIN — LOSARTAN POTASSIUM 25 MG: 25 TABLET, FILM COATED ORAL at 09:08

## 2024-08-28 RX ADMIN — ACETAMINOPHEN 1000 MG: 500 TABLET ORAL at 05:08

## 2024-08-28 RX ADMIN — ACETAMINOPHEN 1000 MG: 500 TABLET ORAL at 09:08

## 2024-08-28 RX ADMIN — METHOCARBAMOL 750 MG: 750 TABLET ORAL at 02:08

## 2024-08-28 RX ADMIN — METHOCARBAMOL 750 MG: 750 TABLET ORAL at 09:08

## 2024-08-28 RX ADMIN — METHOCARBAMOL 750 MG: 750 TABLET ORAL at 08:08

## 2024-08-28 RX ADMIN — ATORVASTATIN CALCIUM 40 MG: 40 TABLET, FILM COATED ORAL at 08:08

## 2024-08-28 RX ADMIN — AMITRIPTYLINE HYDROCHLORIDE 10 MG: 10 TABLET, FILM COATED ORAL at 08:08

## 2024-08-28 RX ADMIN — DILTIAZEM HYDROCHLORIDE 180 MG: 180 CAPSULE, EXTENDED RELEASE ORAL at 09:08

## 2024-08-28 RX ADMIN — CEFAZOLIN 2 G: 2 INJECTION, POWDER, FOR SOLUTION INTRAMUSCULAR; INTRAVENOUS at 09:08

## 2024-08-28 NOTE — PROGRESS NOTES
Nurses Note -- 4 Eyes      8/27/2024   10:10 PM      Skin assessed during: Q Shift Change      [x] No Altered Skin Integrity Present    [x]Prevention Measures Documented      [] Yes- Altered Skin Integrity Present or Discovered   [] LDA Added if Not in Epic (Describe Wound)   [] New Altered Skin Integrity was Present on Admit and Documented in LDA   [] Wound Image Taken    Wound Care Consulted? No    Attending Nurse:  Carrillo Rich RN/Staff Member:  BENJIE Hutchison

## 2024-08-28 NOTE — PT/OT/SLP EVAL
Physical Therapy Evaluation    Patient Name:  David Solomon   MRN:  [id number]    Recommendations:     Discharge Recommendations: Low Intensity Therapy   Discharge Equipment Recommendations: none   Barriers to discharge: None    Assessment:     David Solomon is a 74 y.o. male admitted with a medical diagnosis of Lumbar radiculopathy.  He presents with the following impairments/functional limitations: weakness, impaired endurance, impaired self care skills, impaired functional mobility, gait instability, orthopedic precautions, impaired balance     Pt receptive and tolerated PT co-eval with OT well. Pt educated on spinal precautions prior to start of treatment with pt verbalizing understanding. Pt amb ~80 ft with RW, CGA, and bedside chair. Patient currently demonstrates a need for low intensity therapy on a scheduled basis secondary to a decline in functional status due to surgical procedure    Rehab Prognosis: Good; patient would benefit from acute skilled PT services to address these deficits and reach maximum level of function.    Recent Surgery: Procedure(s) (LRB):  FUSION, SPINE, LUMBAR, TLIF, POSTERIOR APPROACH, USING PEDICLE SCREW L4-5 (L) (N/A) 1 Day Post-Op    Plan:     During this hospitalization, patient to be seen 4 x/week to address the identified rehab impairments via gait training, therapeutic activities, therapeutic exercises, neuromuscular re-education and progress toward the following goals:    Plan of Care Expires:  09/27/24    Subjective     Chief Complaint: none reported  Patient/Family Comments/goals: Pt wanted to put on his pajama pants  Pain/Comfort:  Pain Rating 1: 2/10  Location - Orientation 1: generalized  Location 1: back  Pain Addressed 1: Reposition, Distraction  Pain Rating Post-Intervention 1: 0/10    Patients cultural, spiritual, [employer]aritan conflicts given the current situation: no    Patient History:     Living Environment: Pt lives with spouse in [facility] with 3 KELLI.    Prior Level of  Function: IND with amb and ADLs  DME owned: shower stool, rollator, RW, s/c  Caregiver Assistance: Pt reports he has neighbors that can come check on him    Objective:     Communicated with RN prior to session.  Patient found HOB elevated with hemovac, peripheral IV, SCD  upon PT entry to room.    General Precautions: Standard, fall  Orthopedic Precautions:spinal precautions   Braces: N/A  Respiratory Status: Room air    Exams:  Gross Motor Coordination:  WFL  Sensation:    -       Intact  RLE ROM: WFL  RLE Strength: WFL  LLE ROM: WFL  LLE Strength: WFL    Functional Mobility:    Bed Mobility:   Supine > Sit: stand by assistance    Transfers:   Sit <> Stand Transfer: contact guard assistance from EOB without AD  Sit <> stand from bedside chair: contact guard assistance using RW  Bed <> Chair: contact guard assistance without AD    Balance:   Sitting balance: FAIR+: Maintains balance through MINIMAL excursions of active trunk motion  Standing balance:   FAIR: Maintains without assist but unable to take challenges  FAIR: Needs CONTACT GUARD during gait                 Gait:  Distance: 80 ft    Assistive Device: RW  Assistance Level: contact guard assistance  Gait Assessment: Pt amb with decreased uriel using step to gait pattern progressing to step through gait pattern      AM-PAC 6 CLICK MOBILITY  Total Score:20       Treatment & Education:  Pt educated on tip to reduce fall risk and safety with mobility and using call button for assistance from nursing staff with OOB mobility.  Pt educated on sitting up in chair throughout most of day  Pt educated on amb 2-3x per day with assistance from staff and increased movement during hospital stay.  All questions answered within the scope of PT.  White board updated accordingly.    Patient left up in chair with all lines intact, call button in reach, and Student nurse present.    GOALS:   Multidisciplinary Problems       Physical Therapy Goals          Problem: Physical  Therapy    Goal Priority Disciplines Outcome Goal Variances Interventions   Physical Therapy Goal     PT, PT/OT Progressing     Description: Goals to be met by: 24     Patient will increase functional independence with mobility by performin. Supine to sit with Set-up [place]  2. Sit to stand transfer with Supervision  3. Bed to chair transfer with Supervision using LRAD  4. Gait  x 150 feet with Supervision using LRAD.                          History:     Past Medical History:   Diagnosis Date    Cataract     Diabetes mellitus     Diabetic retinopathy     Hypertension        Past Surgical History:   Procedure Laterality Date    CATARACT EXTRACTION Right 2022    Dr. Ricketts    DESTRUCTION, INTRAOSSEOUS BASIVERTEBRAL NERVE, 1ST TWO BODIES, LUM/SAC N/A 2024    Procedure: DESTRUCTION,INTRAOSSEOUS BASIVERTEBRAL NERVE,1ST TWO BODIES,LUM/SAC;  Surgeon: Tomeka Lowry MD;  Location: [facility] OR;  Service: Pain Management;  Laterality: N/A;    EPIDURAL STEROID INJECTION INTO LUMBAR SPINE N/A 2023    Procedure: L5-S1 LESI;  Surgeon: Tomeka Lowry MD;  Location: [facility] PAIN MANAGEMENT;  Service: Pain Management;  Laterality: N/A;  oral 20 mins    EPIDURAL STEROID INJECTION INTO LUMBAR SPINE Bilateral 2024    Procedure: B/L L5-S1 TFESI;  Surgeon: Tyree Ochoa DO;  Location: [facility] PAIN MANAGEMENT;  Service: Pain Management;  Laterality: Bilateral;  20 mins    EPIDURAL STEROID INJECTION INTO LUMBAR SPINE Bilateral 3/15/2024    Procedure: B/L L4-5 TFESI;  Surgeon: Tomeka Lowry MD;  Location: [facility] PAIN MANAGEMENT;  Service: Pain Management;  Laterality: Bilateral;  20 mins    FUSION, SPINE, LUMBAR, TLIF, POSTERIOR APPROACH, USING PEDICLE SCREW N/A 2024    Procedure: FUSION, SPINE, LUMBAR, TLIF, POSTERIOR APPROACH, USING PEDICLE SCREW L4-5 (L);  Surgeon: Suresh Yeh MD;  Location: [address];  Service: Neurosurgery;  Laterality: N/A;    INTRAOCULAR PROSTHESES INSERTION Right  3/27/2022    Procedure: INSERTION, IOL PROSTHESIS;  Surgeon: Divine Ricketts MD;  Location: [address];  Service: Ophthalmology;  Laterality: Right;    INTRAOCULAR PROSTHESES INSERTION Left 5/15/2022    Procedure: INSERTION, IOL PROSTHESIS;  Surgeon: Divine Ricketts MD;  Location: [address];  Service: Ophthalmology;  Laterality: Left;    PHACOEMULSIFICATION OF CATARACT Right 3/27/2022    Procedure: PHACOEMULSIFICATION, CATARACT;  Surgeon: Divine Ricketts MD;  Location: [facility] OR [address] [place] [address];  Service: Ophthalmology;  Laterality: Right;    PHACOEMULSIFICATION OF CATARACT Left 5/15/2022    Procedure: PHACOEMULSIFICATION, CATARACT;  Surgeon: Divine Ricketts MD;  Location: [address];  Service: Ophthalmology;  Laterality: Left;       Time Tracking:     PT Received On: 08/28/24  PT Start Time: 1109     PT Stop Time: 1133  PT Total Time (min): 24 min     Billable Minutes: Evaluation 10 and Gait Training 14      08/28/2024

## 2024-08-28 NOTE — PLAN OF CARE
PT eval completed- see note for details, goals and POC established.     Problem: Physical Therapy  Goal: Physical Therapy Goal  Description: Goals to be met by: 24     Patient will increase functional independence with mobility by performin. Supine to sit with Set-up [place]  2. Sit to stand transfer with Supervision  3. Bed to chair transfer with Supervision using LRAD  4. Gait  x 150 feet with Supervision using LRAD.     Outcome: Progressing   2024

## 2024-08-28 NOTE — PROGRESS NOTES
[facility] - [facility]  Orthopedics  Progress Note    Patient Name: David Solomon  MRN: [id number]  Admission Date: 8/27/2024  Hospital Length of Stay: 1 days  Attending Provider: Suresh Yeh MD  Primary Care Provider: Charo, Primary Doctor  Follow-up For: Procedure(s) (LRB):  FUSION, SPINE, LUMBAR, TLIF, POSTERIOR APPROACH, USING PEDICLE SCREW L4-5 (L) (N/A)    Post-Operative Day: 1 Day Post-Op  Subjective:     Principal Problem:Lumbar radiculopathy    Principal Orthopedic Problem: s/p L4-5 TLIF 8/27    Interval History: NAEON. VSS. AF. Patient states that pain is well controlled. Voiding spontaneously. Dressing CDI.  Needs to work with physical therapy today.  Drain output from the right side was 50 cc overnight, left output 10 cc overnight.  Hemoglobin 12.3    Plan for physical therapy today.       Review of patient's allergies indicates:  No Known Allergies    Current Facility-Administered Medications   Medication    0.9%  NaCl infusion    0.9%  NaCl infusion    acetaminophen tablet 1,000 mg    amitriptyline tablet 10 mg    amLODIPine tablet 10 mg    atorvastatin tablet 40 mg    calcium carbonate 200 mg calcium (500 mg) chewable tablet 1,000 mg    ceFAZolin 2 g in D5W 50 mL IVPB (MB+)    celecoxib capsule 200 mg    dextrose 10% bolus 125 mL 125 mL    dextrose 10% bolus 250 mL 250 mL    diltiaZEM 24 hr capsule 180 mg    glucagon (human recombinant) injection 1 mg    glucose chewable tablet 16 g    glucose chewable tablet 24 g    heparin (porcine) injection 5,000 Units    hydroCHLOROthiazide tablet 12.5 mg    insulin aspart U-100 pen 0-10 Units    insulin glargine U-100 (Lantus) pen 15 Units    losartan tablet 25 mg    methocarbamoL tablet 750 mg    morphine injection 2 mg    ondansetron tablet 4 mg    oxyCODONE immediate release tablet 5 mg    oxyCODONE immediate release tablet Tab 10 mg    pregabalin capsule 150 mg    senna-docusate 8.6-50 mg per tablet 1 tablet    vitamin D 1000 units tablet 1,000 Units  "    Facility-Administered Medications Ordered in Other Encounters   Medication    phenylephrine HCL 2.5% ophthalmic solution 1 drop    phenylephrine HCL 2.5% ophthalmic solution 1 drop    proparacaine 0.5 % ophthalmic solution 1 drop    proparacaine 0.5 % ophthalmic solution 1 drop    tropicamide 1% ophthalmic solution 1 drop    tropicamide 1% ophthalmic solution 1 drop     Objective:     Vital Signs (Most Recent):  Temp: 98.1 °F (36.7 °C) (08/28/24 0535)  Pulse: 80 (08/28/24 0535)  Resp: 18 (08/28/24 0535)  BP: (!) 155/73 (08/28/24 0535)  SpO2: 97 % (08/28/24 0535) Vital Signs (24h Range):  Temp:  [97.5 °F (36.4 °C)-98.7 °F (37.1 °C)] 98.1 °F (36.7 °C)  Pulse:  [64-88] 80  Resp:  [7-23] 18  SpO2:  [95 %-100 %] 97 %  BP: (119-169)/(50-77) 155/73     Weight: 74.4 kg (164 lb 0.4 oz)  Height: 5' 7.5" (171.5 cm)  Body mass index is 25.31 kg/m².      Intake/Output Summary (Last 24 hours) at 8/28/2024 0805  Last data filed at 8/28/2024 0338  Gross per 24 hour   Intake 3119.84 ml   Output 810 ml   Net 2309.84 ml        Ortho/SPM Exam       General appearance - no acute distress, conversant  Musculoskeletal -  Dressing C/D/I  Fires quad/TA/EHL/GSC  SILT SP/DP/TN  WWP distally, 2+ DP bilaterally  Calves soft, nontender bilateral      Significant Labs: CBC:   Recent Labs   Lab 08/28/24  0532   WBC 14.82*   HGB 12.3*   HCT 34.9*        CMP:   Recent Labs   Lab 08/28/24  0532   *   K 4.6      CO2 20*   *   BUN 22   CREATININE 1.2   CALCIUM 8.7   PROT 5.9*   ALBUMIN 3.2*   BILITOT 0.8   ALKPHOS 73   AST 27   ALT 20   ANIONGAP 7*     All pertinent labs within the past 24 hours have been reviewed.    Significant Imaging: I have reviewed and interpreted all pertinent imaging results/findings.  Assessment/Plan:     * Lumbar radiculopathy  David Solomon is a 74 y.o. male is s/p L4-5 TLIF on 8/27    Surgical dressing C/D/I  Pain control: multimodal  PT/OT: WBAT BLE, spine precautions  DVT PPx: SQH TID, SCDs " at all times when not ambulating   Abx: postop Ancef x 24hrs   Drain:  X2, continue to monitor output  Pacheco: Remove POD 1   Nursing: Incentive spirometry, Monitor and record drain output each shift     Dispo: plan to dc pending physical therapy and drain output    Output by Drain (mL) 08/26/24 0701 - 08/26/24 1900 08/26/24 1901 - 08/27/24 0700 08/27/24 0701 - 08/27/24 1900 08/27/24 1901 - 08/28/24 0700 08/28/24 0701 - 08/28/24 0807        Closed/Suction Drain 08/27/24 0916 Tube - 1 Inferior;Left Back Other (Comment) 10 Fr.   0 10         Closed/Suction Drain 08/27/24 0917 Tube - 2 Inferior;Right Back Other (Comment) 10 Fr.   0 50      p          LESLIE CHAVEZ MD  Orthopedics  [facility] - Surgery

## 2024-08-28 NOTE — ASSESSMENT & PLAN NOTE
David Solomon is a 74 y.o. male is s/p L4-5 TLIF on 8/27    Surgical dressing C/D/I  Pain control: multimodal  PT/OT: WBAT BLE, spine precautions  DVT PPx: SQH TID, SCDs at all times when not ambulating   Abx: postop Ancef x 24hrs   Drain:  X2, continue to monitor output  Pacheco: Remove POD 1   Nursing: Incentive spirometry, Monitor and record drain output each shift     Dispo: plan to dc pending physical therapy and drain output    Output by Drain (mL) 08/26/24 0701 - 08/26/24 1900 08/26/24 1901 - 08/27/24 0700 08/27/24 0701 - 08/27/24 1900 08/27/24 1901 - 08/28/24 0700 08/28/24 0701 - 08/28/24 0807        Closed/Suction Drain 08/27/24 0916 Tube - 1 Inferior;Left Back Other (Comment) 10 Fr.   0 10         Closed/Suction Drain 08/27/24 0917 Tube - 2 Inferior;Right Back Other (Comment) 10 Fr.   0 50      p

## 2024-08-28 NOTE — PLAN OF CARE
Problem: Occupational Therapy  Goal: Occupational Therapy Goal  Description: Goals to be met by: 9/27/24     Patient will increase functional independence with ADLs by performing:    Demonstrating functional use of AD needed for ADLs c/ assistance as needed.    Outcome: Progressing

## 2024-08-28 NOTE — SUBJECTIVE & OBJECTIVE
Principal Problem:Lumbar radiculopathy    Principal Orthopedic Problem: s/p L4-5 TLIF 8/27    Interval History: NAEON. VSS. AF. Patient states that pain is well controlled. Voiding spontaneously. Dressing CDI.  Needs to work with physical therapy today.  Drain output from the right side was 50 cc overnight, left output 10 cc overnight.  Hemoglobin 12.3    Plan for physical therapy today.       Review of patient's allergies indicates:  No Known Allergies    Current Facility-Administered Medications   Medication    0.9%  NaCl infusion    0.9%  NaCl infusion    acetaminophen tablet 1,000 mg    amitriptyline tablet 10 mg    amLODIPine tablet 10 mg    atorvastatin tablet 40 mg    calcium carbonate 200 mg calcium (500 mg) chewable tablet 1,000 mg    ceFAZolin 2 g in D5W 50 mL IVPB (MB+)    celecoxib capsule 200 mg    dextrose 10% bolus 125 mL 125 mL    dextrose 10% bolus 250 mL 250 mL    diltiaZEM 24 hr capsule 180 mg    glucagon (human recombinant) injection 1 mg    glucose chewable tablet 16 g    glucose chewable tablet 24 g    heparin (porcine) injection 5,000 Units    hydroCHLOROthiazide tablet 12.5 mg    insulin aspart U-100 pen 0-10 Units    insulin glargine U-100 (Lantus) pen 15 Units    losartan tablet 25 mg    methocarbamoL tablet 750 mg    morphine injection 2 mg    ondansetron tablet 4 mg    oxyCODONE immediate release tablet 5 mg    oxyCODONE immediate release tablet Tab 10 mg    pregabalin capsule 150 mg    senna-docusate 8.6-50 mg per tablet 1 tablet    vitamin D 1000 units tablet 1,000 Units     Facility-Administered Medications Ordered in Other Encounters   Medication    phenylephrine HCL 2.5% ophthalmic solution 1 drop    phenylephrine HCL 2.5% ophthalmic solution 1 drop    proparacaine 0.5 % ophthalmic solution 1 drop    proparacaine 0.5 % ophthalmic solution 1 drop    tropicamide 1% ophthalmic solution 1 drop    tropicamide 1% ophthalmic solution 1 drop     Objective:     Vital Signs (Most  "Recent):  Temp: 98.1 °F (36.7 °C) (08/28/24 0535)  Pulse: 80 (08/28/24 0535)  Resp: 18 (08/28/24 0535)  BP: (!) 155/73 (08/28/24 0535)  SpO2: 97 % (08/28/24 0535) Vital Signs (24h Range):  Temp:  [97.5 °F (36.4 °C)-98.7 °F (37.1 °C)] 98.1 °F (36.7 °C)  Pulse:  [64-88] 80  Resp:  [7-23] 18  SpO2:  [95 %-100 %] 97 %  BP: (119-169)/(50-77) 155/73     Weight: 74.4 kg (164 lb 0.4 oz)  Height: 5' 7.5" (171.5 cm)  Body mass index is 25.31 kg/m².      Intake/Output Summary (Last 24 hours) at 8/28/2024 0805  Last data filed at 8/28/2024 0338  Gross per 24 hour   Intake 3119.84 ml   Output 810 ml   Net 2309.84 ml        Ortho/SPM Exam       General appearance - no acute distress, conversant  Musculoskeletal -  Dressing C/D/I  Fires quad/TA/EHL/GSC  SILT SP/DP/TN  WWP distally, 2+ DP bilaterally  Calves soft, nontender bilateral      Significant Labs: CBC:   Recent Labs   Lab 08/28/24  0532   WBC 14.82*   HGB 12.3*   HCT 34.9*        CMP:   Recent Labs   Lab 08/28/24  0532   *   K 4.6      CO2 20*   *   BUN 22   CREATININE 1.2   CALCIUM 8.7   PROT 5.9*   ALBUMIN 3.2*   BILITOT 0.8   ALKPHOS 73   AST 27   ALT 20   ANIONGAP 7*     All pertinent labs within the past 24 hours have been reviewed.    Significant Imaging: I have reviewed and interpreted all pertinent imaging results/findings.  "

## 2024-08-29 VITALS
BODY MASS INDEX: 24.86 KG/M2 | HEIGHT: 68 IN | DIASTOLIC BLOOD PRESSURE: 67 MMHG | OXYGEN SATURATION: 97 % | SYSTOLIC BLOOD PRESSURE: 145 MMHG | TEMPERATURE: 98 F | HEART RATE: 81 BPM | RESPIRATION RATE: 20 BRPM | WEIGHT: 164 LBS

## 2024-08-29 LAB
POCT GLUCOSE: 182 MG/DL (ref 70–110)
POCT GLUCOSE: 236 MG/DL (ref 70–110)

## 2024-08-29 PROCEDURE — 97530 THERAPEUTIC ACTIVITIES: CPT

## 2024-08-29 PROCEDURE — 97116 GAIT TRAINING THERAPY: CPT

## 2024-08-29 PROCEDURE — 63600175 PHARM REV CODE 636 W HCPCS

## 2024-08-29 PROCEDURE — 25000003 PHARM REV CODE 250

## 2024-08-29 RX ADMIN — OXYCODONE HYDROCHLORIDE 5 MG: 5 TABLET ORAL at 05:08

## 2024-08-29 RX ADMIN — SENNOSIDES AND DOCUSATE SODIUM 1 TABLET: 50; 8.6 TABLET ORAL at 08:08

## 2024-08-29 RX ADMIN — LOSARTAN POTASSIUM 25 MG: 25 TABLET, FILM COATED ORAL at 08:08

## 2024-08-29 RX ADMIN — ACETAMINOPHEN 1000 MG: 500 TABLET ORAL at 05:08

## 2024-08-29 RX ADMIN — AMLODIPINE BESYLATE 10 MG: 5 TABLET ORAL at 08:08

## 2024-08-29 RX ADMIN — HEPARIN SODIUM 5000 UNITS: 5000 INJECTION INTRAVENOUS; SUBCUTANEOUS at 05:08

## 2024-08-29 RX ADMIN — INSULIN ASPART 2 UNITS: 100 INJECTION, SOLUTION INTRAVENOUS; SUBCUTANEOUS at 08:08

## 2024-08-29 RX ADMIN — HYDROCHLOROTHIAZIDE 12.5 MG: 12.5 TABLET ORAL at 08:08

## 2024-08-29 RX ADMIN — DILTIAZEM HYDROCHLORIDE 180 MG: 180 CAPSULE, EXTENDED RELEASE ORAL at 08:08

## 2024-08-29 RX ADMIN — INSULIN GLARGINE 15 UNITS: 100 INJECTION, SOLUTION SUBCUTANEOUS at 08:08

## 2024-08-29 RX ADMIN — METHOCARBAMOL 750 MG: 750 TABLET ORAL at 08:08

## 2024-08-29 RX ADMIN — INSULIN ASPART 4 UNITS: 100 INJECTION, SOLUTION INTRAVENOUS; SUBCUTANEOUS at 12:08

## 2024-08-29 RX ADMIN — CHOLECALCIFEROL TAB 25 MCG (1000 UNIT) 1000 UNITS: 25 TAB at 08:08

## 2024-08-29 RX ADMIN — OXYCODONE HYDROCHLORIDE 10 MG: 10 TABLET ORAL at 08:08

## 2024-08-29 RX ADMIN — CALCIUM CARBONATE (ANTACID) CHEW TAB 500 MG 1000 MG: 500 CHEW TAB at 08:08

## 2024-08-29 RX ADMIN — CELECOXIB 200 MG: 200 CAPSULE ORAL at 08:08

## 2024-08-29 NOTE — DISCHARGE SUMMARY
[facility] - Surgery  Orthopedics  Discharge Summary      Patient Name: David Solomon  MRN: [id number]  Admission Date: 8/27/2024  Hospital Length of Stay: 1 days  Discharge Date and Time:  08/29/2024 6:40 AM  Attending Physician: Suresh Yeh MD   Discharging Provider: LESLIE CHAVEZ MD  Primary Care Provider: Charo, Primary Doctor    HPI: David Solomon is a 74 y.o. male who returns to me today for FU. Patient was last seen on 2/20/24, and he had L4-5 decompression on 4/12/24 at [facility]. He continues to have LBP that radiates posterolaterally down RLE to the calf and down LLE to the knee. He had a repeat postop MRI. He has been taking meds without much relief. He is miserable and wants surgical intervention.     The patient does not smoke or endorse IVDU. He has type I DM (HA1C of 6.2 according to pt). The patient is not on any blood thinners and does not take chronic narcotics. 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    Procedure(s) (LRB):  FUSION, SPINE, LUMBAR, TLIF, POSTERIOR APPROACH, USING PEDICLE SCREW L4-5 (L) (N/A)      Hospital Course: Patient presented for above procedure.  Tolerated it well and was discharged home POD2 after voiding, tolerating diet, ambulating, pain controlled. Discharge instructions, follow-up appointment, and med rec are below.          Significant Diagnostic Studies: No pertinent studies.    Pending Diagnostic Studies:       Procedure Component Value Units Date/Time    X-Ray Lumbar Spine Ap And Lateral [[id number]]     Order Status: Sent Lab Status: No result           Final Active Diagnoses:    Diagnosis Date Noted POA    PRINCIPAL PROBLEM:  Lumbar radiculopathy [M54.16] 08/28/2024 Yes      Problems Resolved During this Admission:      Discharged Condition: good    Disposition: Home-Health Care Svc    Follow Up:  Future Appointments   Date Time Provider Department Center   9/24/2024  9:30 AM [facility]IC-XRAY [facility]   9/24/2024 10:30  Paulette Altamirano PA-C [facility] SPINE [facility]         Patient Instructions:      Diet general     Call MD for:  temperature >100.4     Call MD for:  persistent nausea and vomiting     Call MD for:  severe uncontrolled pain     Call MD for:  difficulty breathing, headache or visual disturbances     Call MD for:  redness, tenderness, or signs of infection (pain, swelling, redness, odor or green/yellow discharge around incision site)     Call MD for:  hives     Call MD for:  persistent dizziness or light-headedness     Call MD for:  extreme fatigue     Leave dressing on - Keep it clean, dry, and intact until clinic visit     Medications:  Reconciled Home Medications:      Medication List        START taking these medications      acetaminophen 500 MG tablet  Commonly known as: TYLENOL  Take 2 tablets (1,000 mg total) by mouth every 8 (eight) hours.     celecoxib 200 MG capsule  Commonly known as: CeleBREX  Take 1 capsule (200 mg total) by mouth once daily.     docusate sodium 100 MG capsule  Commonly known as: COLACE  Take 1 capsule (100 mg total) by mouth 2 (two) times daily as needed for Constipation.     methocarbamoL 500 MG Tab  Commonly known as: ROBAXIN  Take 1 tablet (500 mg total) by mouth 3 (three) times daily. for 10 days     ondansetron 4 MG Tbdl  Commonly known as: ZOFRAN-ODT  Dissolve 2 tablets (8 mg total) by mouth every 8 (eight) hours as needed.     oxyCODONE 5 MG immediate release tablet  Commonly known as: ROXICODONE  Take 1 tablet (5 mg total) by mouth every 4 (four) hours as needed for Pain.            CONTINUE taking these medications      amitriptyline 10 MG tablet  Commonly known as: ELAVIL  TAKE 1 TABLET BY MOUTH ONCE DAILY AT NIGHT AT BEDTIME     amLODIPine 10 MG tablet  Commonly known as: NORVASC  Take 10 mg by mouth.     atorvastatin 40 MG tablet  Commonly known as: LIPITOR  Take 40 mg by mouth every evening.     diltiaZEM 180 mg 24 hr tablet  Commonly known as: CARDIZEM LA  Take 180  mg by mouth once daily.     gabapentin 300 MG capsule  Commonly known as: NEURONTIN  Take 300 mg by mouth 3 (three) times daily.     GLUCAGON EMERGENCY KIT (HUMAN) 1 mg injection  Generic drug: glucagon  Inject 1 mL into the muscle.     HumaLOG KwikPen Insulin 100 unit/mL pen  Generic drug: insulin lispro  Inject into the skin.     hydroCHLOROthiazide 12.5 MG Tab  Commonly known as: HYDRODIURIL  Take 12.5 mg by mouth once daily.     insulin glargine U-100 (Lantus) 100 unit/mL injection  Inject 25 Units into the skin once daily.     irbesartan 75 MG tablet  Commonly known as: AVAPRO  Take 75 mg by mouth every evening.            STOP taking these medications      HYDROcodone-acetaminophen 5-325 mg per tablet  Commonly known as: NORCO     meloxicam 15 MG tablet  Commonly known as: MOBIC     pregabalin 150 MG capsule  Commonly known as: ZOE CHAVEZ MD  Orthopedics  [facility] - Surgery

## 2024-08-29 NOTE — PROGRESS NOTES
[facility] - Surgery  Orthopedics  Progress Note    Patient Name: David Solomon  MRN: [id number]  Admission Date: 8/27/2024  Hospital Length of Stay: 1 days  Attending Provider: Suresh Yeh MD  Primary Care Provider: Charo, Primary Doctor  Follow-up For: Procedure(s) (LRB):  FUSION, SPINE, LUMBAR, TLIF, POSTERIOR APPROACH, USING PEDICLE SCREW L4-5 (L) (N/A)    Post-Operative Day: 2 Days Post-Op  Subjective:     Principal Problem:Lumbar radiculopathy    Principal Orthopedic Problem: s/p L4-5 TLIF 8/27    Interval History: NAEON. VSS. AF. Patient states that pain is well controlled. Voiding spontaneously. Dressing CDI.  Needs to work with physical therapy today.  Drain output from the right side was 0 cc overnight, left output 0 cc overnight.  Hemoglobin 12.3. Drains removed this morning.         Review of patient's allergies indicates:  No Known Allergies    Current Facility-Administered Medications   Medication    0.9%  NaCl infusion    0.9%  NaCl infusion    acetaminophen tablet 1,000 mg    amitriptyline tablet 10 mg    amLODIPine tablet 10 mg    atorvastatin tablet 40 mg    calcium carbonate 200 mg calcium (500 mg) chewable tablet 1,000 mg    celecoxib capsule 200 mg    dextrose 10% bolus 125 mL 125 mL    dextrose 10% bolus 250 mL 250 mL    diltiaZEM 24 hr capsule 180 mg    glucagon (human recombinant) injection 1 mg    glucose chewable tablet 16 g    glucose chewable tablet 24 g    heparin (porcine) injection 5,000 Units    hydroCHLOROthiazide tablet 12.5 mg    insulin aspart U-100 pen 0-10 Units    insulin glargine U-100 (Lantus) pen 15 Units    losartan tablet 25 mg    methocarbamoL tablet 750 mg    morphine injection 2 mg    ondansetron tablet 4 mg    oxyCODONE immediate release tablet 5 mg    oxyCODONE immediate release tablet Tab 10 mg    pregabalin capsule 150 mg    senna-docusate 8.6-50 mg per tablet 1 tablet    vitamin D 1000 units tablet 1,000 Units     Facility-Administered Medications Ordered in Other  "Encounters   Medication    phenylephrine HCL 2.5% ophthalmic solution 1 drop    phenylephrine HCL 2.5% ophthalmic solution 1 drop    proparacaine 0.5 % ophthalmic solution 1 drop    proparacaine 0.5 % ophthalmic solution 1 drop    tropicamide 1% ophthalmic solution 1 drop    tropicamide 1% ophthalmic solution 1 drop     Objective:     Vital Signs (Most Recent):  Temp: 97.8 °F (36.6 °C) (08/29/24 0423)  Pulse: 73 (08/29/24 0423)  Resp: 18 (08/29/24 0553)  BP: 137/65 (08/29/24 0423)  SpO2: (!) 94 % (08/29/24 0423) Vital Signs (24h Range):  Temp:  [97.8 °F (36.6 °C)-98.9 °F (37.2 °C)] 97.8 °F (36.6 °C)  Pulse:  [70-83] 73  Resp:  [16-18] 18  SpO2:  [94 %-99 %] 94 %  BP: (126-161)/(60-70) 137/65     Weight: 74.4 kg (164 lb 0.4 oz)  Height: 5' 7.5" (171.5 cm)  Body mass index is 25.31 kg/m².      Intake/Output Summary (Last 24 hours) at 8/29/2024 0658  Last data filed at 8/29/2024 0458  Gross per 24 hour   Intake 600 ml   Output 1790 ml   Net -1190 ml        Ortho/SPM Exam       General appearance - no acute distress, conversant  Musculoskeletal -  Dressing C/D/I  Fires quad/TA/EHL/GSC  SILT SP/DP/TN  WWP distally, 2+ DP bilaterally  Calves soft, nontender bilateral      Significant Labs: CBC:   Recent Labs   Lab 08/28/24  0532   WBC 14.82*   HGB 12.3*   HCT 34.9*        CMP:   Recent Labs   Lab 08/28/24  0532   *   K 4.6      CO2 20*   *   BUN 22   CREATININE 1.2   CALCIUM 8.7   PROT 5.9*   ALBUMIN 3.2*   BILITOT 0.8   ALKPHOS 73   AST 27   ALT 20   ANIONGAP 7*     All pertinent labs within the past 24 hours have been reviewed.    Significant Imaging: I have reviewed and interpreted all pertinent imaging results/findings.  Assessment/Plan:     * Lumbar radiculopathy  David Solomon is a 74 y.o. male is s/p L4-5 TLIF on 8/27    Surgical dressing C/D/I  Pain control: multimodal  PT/OT: WBAT BLE, spine precautions  DVT PPx: SQH TID, SCDs at all times when not ambulating   Abx: postop Ancef x 24hrs "   Drain:  Removed 8/29  Pacheco: Remove POD 1   Nursing: Incentive spirometry, Monitor and record drain output each shift     Dispo: plan to dc pending physical therapy     Output by Drain (mL) 08/27/24 0701 - 08/27/24 1900 08/27/24 1901 - 08/28/24 0700 08/28/24 0701 - 08/28/24 1900 08/28/24 1901 - 08/29/24 0659        Closed/Suction Drain 08/27/24 0916 Tube - 1 Inferior;Left Back Other (Comment) 10 Fr. 0 10 10         Closed/Suction Drain 08/27/24 0917 Tube - 2 Inferior;Right Back Other (Comment) 10 Fr. 0 50 30      p      M. Elvis Vogt MD  Orthopaedic Surgery   Resident Physician, PGY-1  08/29/2024

## 2024-08-29 NOTE — SUBJECTIVE & OBJECTIVE
Principal Problem:Lumbar radiculopathy    Principal Orthopedic Problem: s/p L4-5 TLIF 8/27    Interval History: NAEON. VSS. AF. Patient states that pain is well controlled. Voiding spontaneously. Dressing CDI.  Needs to work with physical therapy today.  Drain output from the right side was 0 cc overnight, left output 0 cc overnight.  Hemoglobin 12.3. Drains removed this morning.         Review of patient's allergies indicates:  No Known Allergies    Current Facility-Administered Medications   Medication    0.9%  NaCl infusion    0.9%  NaCl infusion    acetaminophen tablet 1,000 mg    amitriptyline tablet 10 mg    amLODIPine tablet 10 mg    atorvastatin tablet 40 mg    calcium carbonate 200 mg calcium (500 mg) chewable tablet 1,000 mg    celecoxib capsule 200 mg    dextrose 10% bolus 125 mL 125 mL    dextrose 10% bolus 250 mL 250 mL    diltiaZEM 24 hr capsule 180 mg    glucagon (human recombinant) injection 1 mg    glucose chewable tablet 16 g    glucose chewable tablet 24 g    heparin (porcine) injection 5,000 Units    hydroCHLOROthiazide tablet 12.5 mg    insulin aspart U-100 pen 0-10 Units    insulin glargine U-100 (Lantus) pen 15 Units    losartan tablet 25 mg    methocarbamoL tablet 750 mg    morphine injection 2 mg    ondansetron tablet 4 mg    oxyCODONE immediate release tablet 5 mg    oxyCODONE immediate release tablet Tab 10 mg    pregabalin capsule 150 mg    senna-docusate 8.6-50 mg per tablet 1 tablet    vitamin D 1000 units tablet 1,000 Units     Facility-Administered Medications Ordered in Other Encounters   Medication    phenylephrine HCL 2.5% ophthalmic solution 1 drop    phenylephrine HCL 2.5% ophthalmic solution 1 drop    proparacaine 0.5 % ophthalmic solution 1 drop    proparacaine 0.5 % ophthalmic solution 1 drop    tropicamide 1% ophthalmic solution 1 drop    tropicamide 1% ophthalmic solution 1 drop     Objective:     Vital Signs (Most Recent):  Temp: 97.8 °F (36.6 °C) (08/29/24 0423)  Pulse: 73  "(08/29/24 0423)  Resp: 18 (08/29/24 0553)  BP: 137/65 (08/29/24 0423)  SpO2: (!) 94 % (08/29/24 0423) Vital Signs (24h Range):  Temp:  [97.8 °F (36.6 °C)-98.9 °F (37.2 °C)] 97.8 °F (36.6 °C)  Pulse:  [70-83] 73  Resp:  [16-18] 18  SpO2:  [94 %-99 %] 94 %  BP: (126-161)/(60-70) 137/65     Weight: 74.4 kg (164 lb 0.4 oz)  Height: 5' 7.5" (171.5 cm)  Body mass index is 25.31 kg/m².      Intake/Output Summary (Last 24 hours) at 8/29/2024 0658  Last data filed at 8/29/2024 0458  Gross per 24 hour   Intake 600 ml   Output 1790 ml   Net -1190 ml        Ortho/SPM Exam       General appearance - no acute distress, conversant  Musculoskeletal -  Dressing C/D/I  Fires quad/TA/EHL/GSC  SILT SP/DP/TN  WWP distally, 2+ DP bilaterally  Calves soft, nontender bilateral      Significant Labs: CBC:   Recent Labs   Lab 08/28/24  0532   WBC 14.82*   HGB 12.3*   HCT 34.9*        CMP:   Recent Labs   Lab 08/28/24  0532   *   K 4.6      CO2 20*   *   BUN 22   CREATININE 1.2   CALCIUM 8.7   PROT 5.9*   ALBUMIN 3.2*   BILITOT 0.8   ALKPHOS 73   AST 27   ALT 20   ANIONGAP 7*     All pertinent labs within the past 24 hours have been reviewed.    Significant Imaging: I have reviewed and interpreted all pertinent imaging results/findings.  "

## 2024-08-29 NOTE — ASSESSMENT & PLAN NOTE
David Solomon is a 74 y.o. male is s/p L4-5 TLIF on 8/27    Surgical dressing C/D/I  Pain control: multimodal  PT/OT: WBAT BLE, spine precautions  DVT PPx: SQH TID, SCDs at all times when not ambulating   Abx: postop Ancef x 24hrs   Drain:  Removed 8/29  Pacheco: Remove POD 1   Nursing: Incentive spirometry, Monitor and record drain output each shift     Dispo: plan to dc pending physical therapy     Output by Drain (mL) 08/27/24 0701 - 08/27/24 1900 08/27/24 1901 - 08/28/24 0700 08/28/24 0701 - 08/28/24 1900 08/28/24 1901 - 08/29/24 0659        Closed/Suction Drain 08/27/24 0916 Tube - 1 Inferior;Left Back Other (Comment) 10 Fr. 0 10 10         Closed/Suction Drain 08/27/24 0917 Tube - 2 Inferior;Right Back Other (Comment) 10 Fr. 0 50 30      p

## 2024-08-29 NOTE — PLAN OF CARE
[facility] - Surgery    HOME HEALTH ORDERS  FACE TO FACE ENCOUNTER    Patient Name: David Solomon  YOB: 1950    PCP: Charo, Primary Doctor   PCP Address: None  PCP Phone Number: None  PCP Fax: None    Encounter Date: 08/29/2024    Admit to Home Health    Diagnoses:  Active Hospital Problems    Diagnosis  POA    *Lumbar radiculopathy [M54.16]  Yes      Resolved Hospital Problems   No resolved problems to display.       Future Appointments   Date Time Provider Department Center   9/24/2024  9:30 AM [facility]   9/24/2024 10:30 AM Paulette Wahl PA-C [facility] SPINE [facility] [place]           I have seen and examined this patient face to face today. My clinical findings that support the need for the home health skilled services and home bound status are the following:  Weakness/numbness causing balance and gait disturbance due to Surgery making it taxing to leave home.  Requiring assistive device to leave home due to unsteady gait caused by Surgery.    Allergies:Review of patient's allergies indicates:  No Known Allergies    Diet: regular diet    Activities: activity as tolerated with spine precautions, no bending or twisting, no lifting more than 10 lbs.     Home Health Admitting Clinician:   SN/PT to complete comprehensive assessment including routine vital signs. Instruct on disease process and s/s of complications to report to MD. Follow specific home health arthoplasty protocol. Review/verify medication list sent home with the patient at time of discharge  and instruct patient/caregiver as needed. If coumadin ordered, coumadin clinic to manage INR with INR draws 2x per week with a goal to maintain INR between 1.8 and 2.2. Frequency may be adjusted depending on start of care date.    Notify MD if SBP > 160 or < 90; DBP > 90 or < 50; HR > 120 or < 50; Temp > 101    Home Medical Equipment:  Walker, 3-1 bedside commode, transfer tub bench    CONSULTS:    Physical Therapy may admit  if patient not on coumadin, PT to perform comprehensive assessment if performing admit visit and generate therapy plan of care. Evaluate for home safety and equipment needs; Establish/upgrade home exercise program. Perform/instruct on therapeutic exercises, gait training, transfer training, and Range of Motion.      OTHER: (only select if patient needs other therapy disciplines)  Occupational Therapy to evaluate and treat. Evaluate home environment for safety and equipment needs. Perform/Instruct on transfers, ADL training, ROM, and therapeutic exercises.   to evaluate for community resources/long-range planning.  Aide to provide assistance with personal care, ADLs, and vital signs.    MISCELLANEOUS CARE:  Routine Skin for Bedridden Patients: Instruct patient/caregiver to apply moisture barrier cream to all skin folds and wet areas in perineal area daily and after baths and all bowel movements.    WOUND CARE ORDERS:  Assess Surgical Incision/DSRG each TX  Aquacel AG drsg applied post-op leave on 14 days post op. Call MD if any drainage reaches border to border of drsg horizontally, s/s of infection, temp >101, induration, swelling or redness.  If dressing is removed per MD order, then apply island dressing, change/teach caregiver to perform daily dressing change if island dressing present.    Medications: Review discharge medications with patient and family and provide education.      Current Discharge Medication List        START taking these medications    Details   acetaminophen (TYLENOL) 500 MG tablet Take 2 tablets (1,000 mg total) by mouth every 8 (eight) hours.  Qty: 180 tablet, Refills: 0      celecoxib (CELEBREX) 200 MG capsule Take 1 capsule (200 mg total) by mouth once daily.  Qty: 30 capsule, Refills: 0      docusate sodium (COLACE) 100 MG capsule Take 1 capsule (100 mg total) by mouth 2 (two) times daily as needed for Constipation.  Qty: 30 capsule, Refills: 0      methocarbamoL (ROBAXIN)  500 MG Tab Take 1 tablet (500 mg total) by mouth 3 (three) times daily. for 10 days  Qty: 30 tablet, Refills: 0      ondansetron (ZOFRAN-ODT) 4 MG TbDL Dissolve 2 tablets (8 mg total) by mouth every 8 (eight) hours as needed.  Qty: 30 tablet, Refills: 0      oxyCODONE (ROXICODONE) 5 MG immediate release tablet Take 1 tablet (5 mg total) by mouth every 4 (four) hours as needed for Pain.  Qty: 28 tablet, Refills: 0    Comments: Bedside delivery [facility] 8/28           CONTINUE these medications which have NOT CHANGED    Details   amitriptyline (ELAVIL) 10 MG tablet TAKE 1 TABLET BY MOUTH ONCE DAILY AT NIGHT AT BEDTIME      amLODIPine (NORVASC) 10 MG tablet Take 10 mg by mouth.      atorvastatin (LIPITOR) 40 MG tablet Take 40 mg by mouth every evening.      diltiaZEM (CARDIZEM LA) 180 mg 24 hr tablet Take 180 mg by mouth once daily.      gabapentin (NEURONTIN) 300 MG capsule Take 300 mg by mouth 3 (three) times daily.      HUMALOG KWIKPEN INSULIN 100 unit/mL pen Inject into the skin.      hydroCHLOROthiazide (HYDRODIURIL) 12.5 MG Tab Take 12.5 mg by mouth once daily.      insulin glargine (LANTUS) 100 unit/mL injection Inject 25 Units into the skin once daily.      irbesartan (AVAPRO) 75 MG tablet Take 75 mg by mouth every evening.      GLUCAGON EMERGENCY KIT, HUMAN, 1 mg injection Inject 1 mL into the muscle.           STOP taking these medications       HYDROcodone-acetaminophen (NORCO) 5-325 mg per tablet Comments:   Reason for Stopping:         pregabalin (LYRICA) 150 MG capsule Comments:   Reason for Stopping:         meloxicam (MOBIC) 15 MG tablet Comments:   Reason for Stopping:               I certify that this patient is confined to his home and needs intermittent skilled nursing care, physical therapy, and occupational therapy.

## 2024-08-29 NOTE — PLAN OF CARE
08/29/24 0932   Post-Acute Status   Post-Acute Authorization Home Health   Home Health Status Set-up Complete/Auth obtained   Discharge Plan   Discharge Plan A Home Health     Patient has been assigned to [facility] - HH order sent.    Mariela Malone LMSW  Case Management   [facility]   Ext. [phone]

## 2024-08-29 NOTE — PT/OT/SLP PROGRESS
Physical Therapy Treatment and Discharge Note    Patient Name:  David Solomon   MRN:  [id number]    Recommendations:     Discharge Recommendations: Low Intensity Therapy  Discharge Equipment Recommendations: none  Barriers to discharge: None    Assessment:     David Solomon is a 74 y.o. male admitted with a medical diagnosis of Lumbar radiculopathy.  He presents with the following impairments/functional limitations: impaired balance, weakness, orthopedic precautions     Pt receptive and tolerated PT treatment well. Pt amb ~350 ft with RW and mod I this session. Pt demonstrated good potential to continue to progress functional mobility independently and will be discharged from acute PT services at this time.  Patient continues to demonstrate the need for low intensity therapy on a scheduled basis exhibited by decreased independence with self-care and functional mobility    Rehab Prognosis: Good; patient would benefit from acute skilled PT services to address these deficits and reach maximum level of function.    Recent Surgery: Procedure(s) (LRB):  FUSION, SPINE, LUMBAR, TLIF, POSTERIOR APPROACH, USING PEDICLE SCREW L4-5 (L) (N/A) 2 Days Post-Op    Plan:     During this hospitalization, patient to be seen  (Pt discharged from PT) to address the identified rehab impairments via  (Pt discharged from PT) and progress toward the following goals:    Plan of Care Expires:   (Pt discharged from PT)    Subjective     Chief Complaint: none reported  Patient/Family Comments/goals: To get up and walk  Pain/Comfort:  Pain Rating 1: 0/10  Pain Rating Post-Intervention 1: 0/10      Objective:     Communicated with RN prior to session.  Patient found up in chair with  (no active lines) upon PT entry to room.     General Precautions: Standard, fall  Orthopedic Precautions: spinal precautions  Braces: N/A  Respiratory Status: Room air     Functional Mobility:    Bed Mobility:   N/T 2/2 pt sitting up in chair upon PT arrival      Transfers:   Sit <> Stand Transfer: modified independence from bedside chair using rolling walker     Balance:   Sitting balance: FAIR+: Maintains balance through MINIMAL excursions of active trunk motion  Standing balance:   FAIR: Maintains without assist but unable to take challenges  GOOD+: Independent gait (with or without assistive device)                 Gait:  Distance: ~350 ft   Assistive Device: RW  Assistance Level: modified independence  Gait Assessment: Pt amb with fair uriel and no LOB or c/o pain      AM-PAC 6 CLICK MOBILITY  Turning over in bed (including adjusting bedclothes, sheets and blankets)?: 4  Sitting down on and standing up from a chair with arms (e.g., wheelchair, bedside commode, etc.): 4  Moving from lying on back to sitting on the side of the bed?: 4  Moving to and from a bed to a chair (including a wheelchair)?: 4  Need to walk in hospital room?: 4  Climbing 3-5 steps with a railing?: 4  Basic Mobility Total Score: 24       Treatment & Education:  Pt educated on tip to reduce fall risk and safety with mobility and using call button for assistance from nursing staff with OOB mobility.  Pt educated on sitting up in chair throughout most of day  Pt educated on amb 2-3x per day with assistance from staff and increased movement during hospital stay.  All questions answered within the scope of PT.  White board updated accordingly.    Patient left up in chair with all lines intact and call button in reach..    GOALS:   Multidisciplinary Problems       Physical Therapy Goals          Problem: Physical Therapy    Goal Priority Disciplines Outcome Goal Variances Interventions   Physical Therapy Goal     PT, PT/OT Progressing     Description: Goals to be met by: 24     Patient will increase functional independence with mobility by performin. Supine to sit with Set-up Lares  2. Sit to stand transfer with Supervision  3. Bed to chair transfer with Supervision using LRAD  4.  Gait  x 150 feet with Supervision using LRAD.                          Time Tracking:     PT Received On: 08/29/24  PT Start Time: 0931     PT Stop Time: 0945  PT Total Time (min): 14 min     Billable Minutes: Gait Training 14    Treatment Type: Treatment  PT/PTA: PT           08/29/2024

## 2024-08-29 NOTE — NURSING
Nurses Note -- 4 Eyes      8/29/2024   2:21 AM      Skin assessed during: Q Shift Change      [x] No Altered Skin Integrity Present    []Prevention Measures Documented      [] Yes- Altered Skin Integrity Present or Discovered   [] LDA Added if Not in Epic (Describe Wound)   [] New Altered Skin Integrity was Present on Admit and Documented in LDA   [] Wound Image Taken    Wound Care Consulted? No    Attending Nurse:  FRANCK Palomares    Second RN/Staff Member:     FRANCK Carlisle

## 2024-08-29 NOTE — PT/OT/SLP PROGRESS
Occupational Therapy   Treatment and Discharge    Name: David Solomon  MRN: [id number]  Admitting Diagnosis:  Lumbar radiculopathy  2 Days Post-Op    Recommendations:     Discharge Recommendations: Low Intensity Therapy  Discharge Equipment Recommendations:  none  Barriers to discharge:  None    Assessment:     David Solomon is a 74 y.o. male with a medical diagnosis of Lumbar radiculopathy.  He presents with performance deficits affecting function: orthopedic precautions. Pt fully dressed upon start of session and is at baseline for ADL performance. Plan to DC acute OT services    Rehab Prognosis:  Good; patient would benefit from acute skilled OT services to address these deficits and reach maximum level of function.       Plan:     Patient to be seen  (1-2 visit trial) to address the above listed problems via self-care/home management, therapeutic activities, therapeutic exercises, neuromuscular re-education  Plan of Care Expires: 09/27/24  Plan of Care Reviewed with: patient    Subjective     Chief Complaint: None  Patient/Family Comments/goals: return home  Pain/Comfort:  Pain Rating 1: 0/10    Objective:     Communicated with: Nsg prior to session.  Patient found up in chair with  (no active lines) upon OT entry to room.    General Precautions: Standard, fall    Orthopedic Precautions:spinal precautions  Braces: N/A  Respiratory Status: Room air     Occupational Performance:     Bed Mobility:    PT OOB     Functional Mobility/Transfers:  Functional Mobility: NT; performed with PT prior to session    Activities of Daily Living:  Upper Body Dressing: independence don shirt  Lower Body Dressing: independence don pants    Therapeutic Activities:  Pt demonstrated use and understanding of AD for safety and adherence to  spinal precautions during ADL performance      [facility] 6 Click ADL: 24    Treatment & Education:  Pt educated on role and purpose of therapy  Pt educated on goal setting  Pt educated on benefits of OOB  activity  Pt educated on self advocacy   Pt educated on AD to use to help adhere to precautions  Pt educated on spinal precautions    Patient left up in chair with all lines intact, call button in reach, and nsg notified    GOALS:   Multidisciplinary Problems       Occupational Therapy Goals          Problem: Occupational Therapy    Goal Priority Disciplines Outcome Interventions   Occupational Therapy Goal     OT, PT/OT Progressing    Description: Goals to be met by: 9/27/24     Patient will increase functional independence with ADLs by performing:    Demonstrating functional use of AD needed for ADLs c/ assistance as needed.                         Time Tracking:     OT Date of Treatment: 08/29/24  OT Start Time: 1113  OT Stop Time: 1121  OT Total Time (min): 8 min    Billable Minutes:Therapeutic Activity 8    OT/MARA: OT          8/29/2024

## 2024-08-30 PROCEDURE — G0180 MD CERTIFICATION HHA PATIENT: HCPCS | Mod: ,,, | Performed by: ORTHOPAEDIC SURGERY

## 2024-09-02 ENCOUNTER — HOSPITAL ENCOUNTER (EMERGENCY)
Facility: HOSPITAL | Age: 74
Discharge: HOME | End: 2024-09-02
Attending: EMERGENCY MEDICINE
Payer: MEDICARE

## 2024-09-02 VITALS
OXYGEN SATURATION: 100 % | BODY MASS INDEX: 25.01 KG/M2 | WEIGHT: 165 LBS | HEART RATE: 94 BPM | RESPIRATION RATE: 18 BRPM | HEIGHT: 68 IN | TEMPERATURE: 98 F | DIASTOLIC BLOOD PRESSURE: 63 MMHG | SYSTOLIC BLOOD PRESSURE: 133 MMHG

## 2024-09-02 DIAGNOSIS — Z98.1 S/P LUMBAR FUSION: Primary | ICD-10-CM

## 2024-09-02 DIAGNOSIS — Z48.89 ENCOUNTER FOR POST SURGICAL WOUND CHECK: ICD-10-CM

## 2024-09-02 PROBLEM — T14.8XXA WOUND DRAINAGE: Status: ACTIVE | Noted: 2024-09-02

## 2024-09-02 PROCEDURE — 99283 EMERGENCY DEPT VISIT LOW MDM: CPT

## 2024-09-02 RX ORDER — CEPHALEXIN 500 MG/1
500 CAPSULE ORAL 4 TIMES DAILY
Qty: 28 CAPSULE | Refills: 0 | Status: SHIPPED | OUTPATIENT
Start: 2024-09-02 | End: 2024-09-02

## 2024-09-02 RX ORDER — CEPHALEXIN 500 MG/1
500 CAPSULE ORAL 4 TIMES DAILY
Qty: 28 CAPSULE | Refills: 0 | Status: SHIPPED | OUTPATIENT
Start: 2024-09-02 | End: 2024-09-09

## 2024-09-03 NOTE — HPI
David Solomon is a 74 y.o. male with PMH of HTN, T1DM, and recent revision L4-5 TLIF with Dr. Yeh on 08/27/2024 who presents with drainage from his midline lumbar surgical incision.  The patient reports that his surgical drain was removed on postoperative day 1.  The day of drain removal, he noticed some drainage from the wound.  He was then discharged on postoperative day 2, 08/29/2024, and since that time, has noted to have intermittent drainage each day.  He has been reinforced in his dressing as needed.  He was advised by his home health aide to present to the [facility] ED for further evaluation by Orthopedic surgery.      He denies any recent falls.  Overall, he reports that he was doing very well from surgery in his currently not taking pain medication.  He denies fever, chills, nausea, vomiting, diarrhea, chest pain, shortness of breath, vision changes.

## 2024-09-03 NOTE — CONSULTS
[facility] - Emergency Dept  Orthopedics  Consult Note    Patient Name: David Solomon  MRN: [id number]  Admission Date: 9/2/2024  Hospital Length of Stay: 0 days  Attending Provider: Charo att. providers found  Primary Care Provider: Charo, Primary Doctor      Inpatient consult to Orthopedic Surgery  Consult performed by: JESSI Bedoya MD  Consult ordered by: Heather Mcdonald MD        Subjective:     Principal Problem:Wound drainage    Chief Complaint:   Chief Complaint   Patient presents with    Post-op Problem     Recent back surgery last week, states MD pulled FARIDA drain prior to be D/C still having drainage to incision site denies fevers             HPI: David Solomon is a 74 y.o. male with PMH of HTN, T1DM, and recent revision L4-5 TLIF with Dr. Yeh on 08/27/2024 who presents with drainage from his midline lumbar surgical incision.  The patient reports that his surgical drain was removed on postoperative day 1.  The day of drain removal, he noticed some drainage from the wound.  He was then discharged on postoperative day 2, 08/29/2024, and since that time, has noted to have intermittent drainage each day.  He has been reinforced in his dressing as needed.  He was advised by his home health aide to present to the [facility] ED for further evaluation by Orthopedic surgery.      He denies any recent falls.  Overall, he reports that he was doing very well from surgery in his currently not taking pain medication.  He denies fever, chills, nausea, vomiting, diarrhea, chest pain, shortness of breath, vision changes.      Past Medical History:   Diagnosis Date    Cataract     Diabetes mellitus     Diabetic retinopathy     Hypertension        Past Surgical History:   Procedure Laterality Date    CATARACT EXTRACTION Right 03/27/2022    Dr. Ricketts    DESTRUCTION, INTRAOSSEOUS BASIVERTEBRAL NERVE, 1ST TWO BODIES, LUM/SAC N/A 2/19/2024    Procedure: DESTRUCTION,INTRAOSSEOUS BASIVERTEBRAL NERVE,1ST TWO BODIES,LUM/SAC;  Surgeon: Alen  MD Tomeka;  Location: [facility] OR;  Service: Pain Management;  Laterality: N/A;    EPIDURAL STEROID INJECTION INTO LUMBAR SPINE N/A 12/1/2023    Procedure: L5-S1 LESI;  Surgeon: Tomeka Lowry MD;  Location: [facility] PAIN MANAGEMENT;  Service: Pain Management;  Laterality: N/A;  oral 20 mins    EPIDURAL STEROID INJECTION INTO LUMBAR SPINE Bilateral 1/2/2024    Procedure: B/L L5-S1 TFESI;  Surgeon: Tyree Ochoa DO;  Location: [facility] PAIN MANAGEMENT;  Service: Pain Management;  Laterality: Bilateral;  20 mins    EPIDURAL STEROID INJECTION INTO LUMBAR SPINE Bilateral 3/15/2024    Procedure: B/L L4-5 TFESI;  Surgeon: Tomeka Lowry MD;  Location: [facility] PAIN MANAGEMENT;  Service: Pain Management;  Laterality: Bilateral;  20 mins    FUSION, SPINE, LUMBAR, TLIF, POSTERIOR APPROACH, USING PEDICLE SCREW N/A 8/27/2024    Procedure: FUSION, SPINE, LUMBAR, TLIF, POSTERIOR APPROACH, USING PEDICLE SCREW L4-5 (L);  Surgeon: Suresh Yeh MD;  Location: [facility] OR 2ND FLR;  Service: Neurosurgery;  Laterality: N/A;    INTRAOCULAR PROSTHESES INSERTION Right 3/27/2022    Procedure: INSERTION, IOL PROSTHESIS;  Surgeon: Divine Ricketts MD;  Location: [facility] OR 1ST FLR;  Service: Ophthalmology;  Laterality: Right;    INTRAOCULAR PROSTHESES INSERTION Left 5/15/2022    Procedure: INSERTION, IOL PROSTHESIS;  Surgeon: Divine Ricketts MD;  Location: [facility] OR 1ST FLR;  Service: Ophthalmology;  Laterality: Left;    PHACOEMULSIFICATION OF CATARACT Right 3/27/2022    Procedure: PHACOEMULSIFICATION, CATARACT;  Surgeon: Divine Ricketts MD;  Location: [facility] OR 1ST FLR;  Service: Ophthalmology;  Laterality: Right;    PHACOEMULSIFICATION OF CATARACT Left 5/15/2022    Procedure: PHACOEMULSIFICATION, CATARACT;  Surgeon: Divine Ricketts MD;  Location: [facility] OR 1ST FLR;  Service: Ophthalmology;  Laterality: Left;       Review of patient's allergies indicates:  No Known Allergies    No current facility-administered medications for this encounter.      Current Outpatient Medications   Medication Sig    acetaminophen (TYLENOL) 500 MG tablet Take 2 tablets (1,000 mg total) by mouth every 8 (eight) hours.    amitriptyline (ELAVIL) 10 MG tablet TAKE 1 TABLET BY MOUTH ONCE DAILY AT NIGHT AT BEDTIME    amLODIPine (NORVASC) 10 MG tablet Take 10 mg by mouth.    atorvastatin (LIPITOR) 40 MG tablet Take 40 mg by mouth every evening.    celecoxib (CELEBREX) 200 MG capsule Take 1 capsule (200 mg total) by mouth once daily.    cephALEXin (KEFLEX) 500 MG capsule Take 1 capsule (500 mg total) by mouth 4 (four) times daily. for 7 days    diltiaZEM (CARDIZEM LA) 180 mg 24 hr tablet Take 180 mg by mouth once daily.    docusate sodium (COLACE) 100 MG capsule Take 1 capsule (100 mg total) by mouth 2 (two) times daily as needed for Constipation.    gabapentin (NEURONTIN) 300 MG capsule Take 300 mg by mouth 3 (three) times daily.    GLUCAGON EMERGENCY KIT, HUMAN, 1 mg injection Inject 1 mL into the muscle.    HUMALOG KWIKPEN INSULIN 100 unit/mL pen Inject into the skin.    hydroCHLOROthiazide (HYDRODIURIL) 12.5 MG Tab Take 12.5 mg by mouth once daily.    insulin glargine (LANTUS) 100 unit/mL injection Inject 25 Units into the skin once daily.    irbesartan (AVAPRO) 75 MG tablet Take 75 mg by mouth every evening.    methocarbamoL (ROBAXIN) 500 MG Tab Take 1 tablet (500 mg total) by mouth 3 (three) times daily. for 10 days    ondansetron (ZOFRAN-ODT) 4 MG TbDL Dissolve 2 tablets (8 mg total) by mouth every 8 (eight) hours as needed.    oxyCODONE (ROXICODONE) 5 MG immediate release tablet Take 1 tablet (5 mg total) by mouth every 4 (four) hours as needed for Pain.     Facility-Administered Medications Ordered in Other Encounters   Medication    phenylephrine HCL 2.5% ophthalmic solution 1 drop    phenylephrine HCL 2.5% ophthalmic solution 1 drop    proparacaine 0.5 % ophthalmic solution 1 drop    proparacaine 0.5 % ophthalmic solution 1 drop    tropicamide 1% ophthalmic solution 1  "drop    tropicamide 1% ophthalmic solution 1 drop     Family History       Problem Relation (Age of Onset)    No Known Problems Mother, Father          Tobacco Use    Smoking status: Never    Smokeless tobacco: Never   Substance and Sexual Activity    Alcohol use: No    Drug use: No    Sexual activity: Not Currently     ROS  Constitutional: negative for fevers or chills  Eyes: negative visual changes or eye discharge  ENT: negative for ear pain or sore throat  Respiratory: negative for shortness of breath or cough  Cardiovascular: negative for chest pain or palpitations  Gastrointestinal: negative for abdominal pain, nausea, or vomiting  Genitourinary: negative for dysuria and flank pain  Neurological: negative for headaches or dizziness  Musculoskeletal: positive for midline lumbar surgical incision with drainage.      Objective:     Vital Signs (Most Recent):  Temp: 97.9 °F (36.6 °C) (09/02/24 1840)  Pulse: 94 (09/02/24 2130)  Resp: 18 (09/02/24 1837)  BP: 133/63 (09/02/24 2130)  SpO2: 100 % (09/02/24 2130) Vital Signs (24h Range):  Temp:  [97.8 °F (36.6 °C)-97.9 °F (36.6 °C)] 97.9 °F (36.6 °C)  Pulse:  [] 94  Resp:  [18] 18  SpO2:  [97 %-100 %] 100 %  BP: (131-160)/(63-76) 133/63     Weight: 74.8 kg (165 lb)  Height: 5' 7.5" (171.5 cm)  Body mass index is 25.46 kg/m².    No intake or output data in the 24 hours ending 09/02/24 2251     Ortho/SPM Exam  Gen:  No acute distress  CV:  Peripherally well-perfused.    Pulm:  Normal respiratory effort.  Head/Neck:  Normocephalic.  Atraumatic.     Spine/pelvis/axial body:  Midline lumbar surgical incision with Prenio tape in place.  However, the Prineo tape has somewhat peeled up from the caudal most aspect of the incision and there is a very small, 1 mm, area with expressible serous drainage.  He also has a 1.5-2 cm incision posteriorly over the right iliac crest that is well healing.  Neither these incisions show any sign of yesi dehiscence.  Surrounding the " incision, there is no erythema, induration, or skin breakdown.  No tenderness to palpation of cervical, thoracic, or lumbar spine  No pain with compression of pelvis  No chest wall or abdominal tenderness  No decubitus ulcers    Upper Extremity Motor:                                                                                                  RIGHT             LEFT  Deltoid(C5)                                             5/5                  5/5  Biceps(C5)                                             5/5                  5/5  Extensor Carpi Radialis Longus(C6)     5/5                  5/5       Triceps(C7)                                            5/5                  5/5                               Flexor Carpi Radialis(C7)                      5/5                  5/5  Flexor Digitorum Superficialis(C8)         5/5                  5/5  Interossei(T1)                                         5/5                  5/5          Upper Extremity Sensation (a=absent, i=impaired, n=normal):                                                              RIGHT             LEFT  C5 dermatome                                    n                         n  C6 dermatome                                    n                         n  C7 dermatome                                    n                         n  C8 dermatome                                    n                         n  T1 dermatome                                    n                         n     Lower Extremity Motor:                                                                                                                RIGHT             LEFT  Iliopsoas (L2,3)                                      5/5                  5/5  Quadriceps (L3,4)                                  5/5                  5/5   Tibialis Anterior (L4.5)                            5/5                  5/5       Extensor Halucis Longus (L5)               5/5                  5/5                                Gastrocnemius/Soleus (S1)                   5/5                  5/5  Flexor Hallucis Longus(S2)                   5/5                  5/5     Lower Extremity Sensation (a=absent, i=impaired, n=normal):                                                              RIGHT             LEFT  L2 dermatome                                         n                     n  L3 dermatome                                         n                     n  L4 dermatome                                         n                     n  L5 dermatome                                         n                     n  S1 dermatome                                        n                     n  S2 dermatome                                        n                     n          Significant Labs: All pertinent labs within the past 24 hours have been reviewed.    Significant Imaging: I have reviewed and interpreted all pertinent imaging results/findings.  Assessment/Plan:     * Drainage from lumbar spine incsion s/p revision L4-5 TLIF 8/27/24  David Solomon is a 74 y.o. male with PMH of HTN, T1DM, and recent revision L4-5 TLIF with Dr. Yeh on 08/27/2024 who presents with drainage from his midline lumbar surgical incision.  He was afebrile in the ED. He denies any infectious symptoms prior to presentation. Overall, he is doing quite well. He was neurologically intact.  Upon taking down his dressing, the patient was noted to have a very small area at the caudal most aspect of his midline lumbar incision with expressible serous drainage.  There were no clinical signs of infection.  I placed a Prevena wound VAC, educated the patient on the use of this product, and we will have him follow up in clinic this week for a wound check.    Weightbearing as tolerated with spine precautions  Wound VAC to remain in place at all times until clinic follow up.    7 days of Keflex @ discharge   Follow up with Orthopedic spine surgery  later this week.  Patient will be contacted with the appointment details.        KEIKO Bedoya MD  Orthopedics  [facility] - Emergency Dept

## 2024-09-03 NOTE — ED TRIAGE NOTES
Patient had recent back surgery last week the patient stated that his MD pulled his wound drain, the patient states that his wound is continuously draining and has not stopped and was told to come in buy his provider.

## 2024-09-03 NOTE — PROVIDER PROGRESS NOTES - EMERGENCY DEPT.
Patient signed out pending orthopedic surgery recommendations. Ortho placed Prevena wound vac, no laboratory studies recommended. Patient stable for DC home, ortho recommends keflex x7d, outpatient follow up. Stable for DC with outpatient follow up. Strict return precautions discussed, and patient is agreeable with the plan.

## 2024-09-03 NOTE — ASSESSMENT & PLAN NOTE
David Solomon is a 74 y.o. male with PMH of HTN, T1DM, and recent revision L4-5 TLIF with Dr. Yeh on 08/27/2024 who presents with drainage from his midline lumbar surgical incision.  He was afebrile in the ED. He denies any infectious symptoms prior to presentation. Overall, he is doing quite well. He was neurologically intact.  Upon taking down his dressing, the patient was noted to have a very small area at the caudal most aspect of his midline lumbar incision with expressible serous drainage.  There were no clinical signs of infection.  I placed a Prevena wound VAC, educated the patient on the use of this product, and we will have him follow up in clinic this week for a wound check.    Weightbearing as tolerated with spine precautions  Wound VAC to remain in place at all times until clinic follow up.    7 days of Keflex @ discharge   Follow up with Orthopedic spine surgery later this week.  Patient will be contacted with the appointment details.

## 2024-09-03 NOTE — ED PROVIDER NOTES
Encounter Date: 9/2/2024       History     Chief Complaint   Patient presents with    Post-op Problem     Recent back surgery last week, states MD pulled FARIDA drain prior to be D/C still having drainage to incision site denies fevers          David Solomon is a 73 yo male with PMH of HTN and T1DM presenting to the ED for incisional drainage s/p lumbar fusion 8/27/24 by Dr. Yeh. Says that drains were taken out Wednesday and since then he has had multiple episodes of serosanguinous discharge saturating his shirt. Home health worker said pt should go to ED just in case of infection. Denies fevers, chills, pain, numbness, tingling, or bowel/bladder dysfunction.      Review of patient's allergies indicates:  No Known Allergies  Past Medical History:   Diagnosis Date    Cataract     Diabetes mellitus     Diabetic retinopathy     Hypertension      Past Surgical History:   Procedure Laterality Date    CATARACT EXTRACTION Right 03/27/2022    Dr. Ricketts    DESTRUCTION, INTRAOSSEOUS BASIVERTEBRAL NERVE, 1ST TWO BODIES, LUM/SAC N/A 2/19/2024    Procedure: DESTRUCTION,INTRAOSSEOUS BASIVERTEBRAL NERVE,1ST TWO BODIES,LUM/SAC;  Surgeon: Tomeka Lowry MD;  Location: [facility] OR;  Service: Pain Management;  Laterality: N/A;    EPIDURAL STEROID INJECTION INTO LUMBAR SPINE N/A 12/1/2023    Procedure: L5-S1 LESI;  Surgeon: Tomeka Lowry MD;  Location: [facility] PAIN MANAGEMENT;  Service: Pain Management;  Laterality: N/A;  oral 20 mins    EPIDURAL STEROID INJECTION INTO LUMBAR SPINE Bilateral 1/2/2024    Procedure: B/L L5-S1 TFESI;  Surgeon: Tyree Ochoa DO;  Location: [facility] PAIN MANAGEMENT;  Service: Pain Management;  Laterality: Bilateral;  20 mins    EPIDURAL STEROID INJECTION INTO LUMBAR SPINE Bilateral 3/15/2024    Procedure: B/L L4-5 TFESI;  Surgeon: Tomeka Lowry MD;  Location: [facility] PAIN MANAGEMENT;  Service: Pain Management;  Laterality: Bilateral;  20 mins    FUSION, SPINE, LUMBAR, TLIF, POSTERIOR APPROACH, USING PEDICLE SCREW  N/A 8/27/2024    Procedure: FUSION, SPINE, LUMBAR, TLIF, POSTERIOR APPROACH, USING PEDICLE SCREW L4-5 (L);  Surgeon: Suresh Yeh MD;  Location: [facility] OR 2ND FLR;  Service: Neurosurgery;  Laterality: N/A;    INTRAOCULAR PROSTHESES INSERTION Right 3/27/2022    Procedure: INSERTION, IOL PROSTHESIS;  Surgeon: Divine Ricketts MD;  Location: [facility] OR 1ST FLR;  Service: Ophthalmology;  Laterality: Right;    INTRAOCULAR PROSTHESES INSERTION Left 5/15/2022    Procedure: INSERTION, IOL PROSTHESIS;  Surgeon: Divine Ricketts MD;  Location: [facility] OR 1ST FLR;  Service: Ophthalmology;  Laterality: Left;    PHACOEMULSIFICATION OF CATARACT Right 3/27/2022    Procedure: PHACOEMULSIFICATION, CATARACT;  Surgeon: Divine Ricketts MD;  Location: [facility] OR 1ST FLR;  Service: Ophthalmology;  Laterality: Right;    PHACOEMULSIFICATION OF CATARACT Left 5/15/2022    Procedure: PHACOEMULSIFICATION, CATARACT;  Surgeon: Divine Ricketts MD;  Location: [facility] OR [address] [place] [address];  Service: Ophthalmology;  Laterality: Left;     Family History   Problem Relation Name Age of Onset    No Known Problems Mother      No Known Problems Father      Melanoma Neg Hx       Social History     Tobacco Use    Smoking status: Never    Smokeless tobacco: Never   Substance Use Topics    Alcohol use: No    Drug use: No     Review of Systems    Physical Exam     Initial Vitals [09/02/24 1837]   BP Pulse Resp Temp SpO2   (!) 160/76 105 18 97.8 °F (36.6 °C) 97 %      MAP       --         Physical Exam    Constitutional: He appears well-developed and well-nourished. He is not diaphoretic. No distress.   HENT:   Head: Normocephalic and atraumatic.   Mouth/Throat: Oropharynx is clear and moist.   Eyes: Conjunctivae and EOM are normal. Pupils are equal, round, and reactive to light.   Neck: Neck supple.   Normal range of motion.  Cardiovascular:  Normal rate, regular rhythm, normal heart sounds and intact distal pulses.           No murmur heard.  Pulmonary/Chest: Breath sounds  normal. No respiratory distress. He has no wheezes. He exhibits no tenderness.   Abdominal: Abdomen is soft. Bowel sounds are normal. He exhibits no distension. There is no abdominal tenderness.   Musculoskeletal:         General: No tenderness or edema. Normal range of motion.      Cervical back: Normal range of motion and neck supple.      Lumbar back: No swelling, edema or tenderness.      Comments: Well healing incision site. Clean without erythema or swelling. No signs of dehiscence. Serosanguinous discharge at bottom of incision.     Neurological: He is alert and oriented to person, place, and time. He has normal strength and normal reflexes. No cranial nerve deficit or sensory deficit.   Skin: Skin is warm and dry. Capillary refill takes less than 2 seconds.   Psychiatric: He has a normal mood and affect. His behavior is normal. Judgment and thought content normal.         ED Course   Procedures  Labs Reviewed - No data to display       Imaging Results    None          Medications - No data to display  Medical Decision Making  David Solomon is a 75 yo male with PMH of HTN and T1DM presenting to the ED for incisional drainage s/p lumbar fusion 8/27/24 by Dr. Yeh. Well healing incision site. Clean without erythema or swelling. No signs of dehiscence. Serosanguinous discharge at bottom of incision. Not overly concerned for infection but will order labs due to hx of T1DM. Consulted orthopaedics, pending response.   Pending CBC, CMP, sed rate, and CRP.   Signed out with on-coming physician.     Amount and/or Complexity of Data Reviewed  Labs: ordered.                                      Clinical Impression:  Final diagnoses:  [Z98.1] S/P lumbar fusion (Primary)                 Kaila Gaming MD  Resident  09/02/24 2100       Kaila Gaming MD  Resident  09/02/24 2102

## 2024-09-03 NOTE — ED NOTES
Ortho at bedside    Detail Level: Detailed Wound Evaluated By: Dr. Moreno Matias M.D Add 27749 Cpt? (Important Note: In 2017 The Use Of 17568 Is Being Tracked By Cms To Determine Future Global Period Reimbursement For Global Periods): no

## 2024-09-03 NOTE — SUBJECTIVE & OBJECTIVE
Past Medical History:   Diagnosis Date    Cataract     Diabetes mellitus     Diabetic retinopathy     Hypertension        Past Surgical History:   Procedure Laterality Date    CATARACT EXTRACTION Right 03/27/2022    Dr. Ricketts    DESTRUCTION, INTRAOSSEOUS BASIVERTEBRAL NERVE, 1ST TWO BODIES, LUM/SAC N/A 2/19/2024    Procedure: DESTRUCTION,INTRAOSSEOUS BASIVERTEBRAL NERVE,1ST TWO BODIES,LUM/SAC;  Surgeon: Tomeka Lowry MD;  Location: [facility] OR;  Service: Pain Management;  Laterality: N/A;    EPIDURAL STEROID INJECTION INTO LUMBAR SPINE N/A 12/1/2023    Procedure: L5-S1 LESI;  Surgeon: Tomeka Lowry MD;  Location: [facility] PAIN MANAGEMENT;  Service: Pain Management;  Laterality: N/A;  oral 20 mins    EPIDURAL STEROID INJECTION INTO LUMBAR SPINE Bilateral 1/2/2024    Procedure: B/L L5-S1 TFESI;  Surgeon: Tyree Ochoa DO;  Location: [facility] PAIN MANAGEMENT;  Service: Pain Management;  Laterality: Bilateral;  20 mins    EPIDURAL STEROID INJECTION INTO LUMBAR SPINE Bilateral 3/15/2024    Procedure: B/L L4-5 TFESI;  Surgeon: Tomeka Lowry MD;  Location: [facility] PAIN MANAGEMENT;  Service: Pain Management;  Laterality: Bilateral;  20 mins    FUSION, SPINE, LUMBAR, TLIF, POSTERIOR APPROACH, USING PEDICLE SCREW N/A 8/27/2024    Procedure: FUSION, SPINE, LUMBAR, TLIF, POSTERIOR APPROACH, USING PEDICLE SCREW L4-5 (L);  Surgeon: Suresh Yeh MD;  Location: [facility] OR 2ND FLR;  Service: Neurosurgery;  Laterality: N/A;    INTRAOCULAR PROSTHESES INSERTION Right 3/27/2022    Procedure: INSERTION, IOL PROSTHESIS;  Surgeon: Divine Ricketts MD;  Location: [facility] OR 1ST FLR;  Service: Ophthalmology;  Laterality: Right;    INTRAOCULAR PROSTHESES INSERTION Left 5/15/2022    Procedure: INSERTION, IOL PROSTHESIS;  Surgeon: Divine Ricketts MD;  Location: [facility] OR 1ST FLR;  Service: Ophthalmology;  Laterality: Left;    PHACOEMULSIFICATION OF CATARACT Right 3/27/2022    Procedure: PHACOEMULSIFICATION, CATARACT;  Surgeon: Divine Ricketts MD;   Location: [facility] OR [address] [place] [address];  Service: Ophthalmology;  Laterality: Right;    PHACOEMULSIFICATION OF CATARACT Left 5/15/2022    Procedure: PHACOEMULSIFICATION, CATARACT;  Surgeon: Divine Ricketts MD;  Location: [facility] OR [address] [place] [address];  Service: Ophthalmology;  Laterality: Left;       Review of patient's allergies indicates:  No Known Allergies    No current facility-administered medications for this encounter.     Current Outpatient Medications   Medication Sig    acetaminophen (TYLENOL) 500 MG tablet Take 2 tablets (1,000 mg total) by mouth every 8 (eight) hours.    amitriptyline (ELAVIL) 10 MG tablet TAKE 1 TABLET BY MOUTH ONCE DAILY AT NIGHT AT BEDTIME    amLODIPine (NORVASC) 10 MG tablet Take 10 mg by mouth.    atorvastatin (LIPITOR) 40 MG tablet Take 40 mg by mouth every evening.    celecoxib (CELEBREX) 200 MG capsule Take 1 capsule (200 mg total) by mouth once daily.    cephALEXin (KEFLEX) 500 MG capsule Take 1 capsule (500 mg total) by mouth 4 (four) times daily. for 7 days    diltiaZEM (CARDIZEM LA) 180 mg 24 hr tablet Take 180 mg by mouth once daily.    docusate sodium (COLACE) 100 MG capsule Take 1 capsule (100 mg total) by mouth 2 (two) times daily as needed for Constipation.    gabapentin (NEURONTIN) 300 MG capsule Take 300 mg by mouth 3 (three) times daily.    GLUCAGON EMERGENCY KIT, HUMAN, 1 mg injection Inject 1 mL into the muscle.    HUMALOG KWIKPEN INSULIN 100 unit/mL pen Inject into the skin.    hydroCHLOROthiazide (HYDRODIURIL) 12.5 MG Tab Take 12.5 mg by mouth once daily.    insulin glargine (LANTUS) 100 unit/mL injection Inject 25 Units into the skin once daily.    irbesartan (AVAPRO) 75 MG tablet Take 75 mg by mouth every evening.    methocarbamoL (ROBAXIN) 500 MG Tab Take 1 tablet (500 mg total) by mouth 3 (three) times daily. for 10 days    ondansetron (ZOFRAN-ODT) 4 MG TbDL Dissolve 2 tablets (8 mg total) by mouth every 8 (eight) hours as needed.    oxyCODONE (ROXICODONE) 5 MG immediate release  "tablet Take 1 tablet (5 mg total) by mouth every 4 (four) hours as needed for Pain.     Facility-Administered Medications Ordered in Other Encounters   Medication    phenylephrine HCL 2.5% ophthalmic solution 1 drop    phenylephrine HCL 2.5% ophthalmic solution 1 drop    proparacaine 0.5 % ophthalmic solution 1 drop    proparacaine 0.5 % ophthalmic solution 1 drop    tropicamide 1% ophthalmic solution 1 drop    tropicamide 1% ophthalmic solution 1 drop     Family History       Problem Relation (Age of Onset)    No Known Problems Mother, Father          Tobacco Use    Smoking status: Never    Smokeless tobacco: Never   Substance and Sexual Activity    Alcohol use: No    Drug use: No    Sexual activity: Not Currently     ROS  Constitutional: negative for fevers or chills  Eyes: negative visual changes or eye discharge  ENT: negative for ear pain or sore throat  Respiratory: negative for shortness of breath or cough  Cardiovascular: negative for chest pain or palpitations  Gastrointestinal: negative for abdominal pain, nausea, or vomiting  Genitourinary: negative for dysuria and flank pain  Neurological: negative for headaches or dizziness  Musculoskeletal: positive for midline lumbar surgical incision with drainage.      Objective:     Vital Signs (Most Recent):  Temp: 97.9 °F (36.6 °C) (09/02/24 1840)  Pulse: 94 (09/02/24 2130)  Resp: 18 (09/02/24 1837)  BP: 133/63 (09/02/24 2130)  SpO2: 100 % (09/02/24 2130) Vital Signs (24h Range):  Temp:  [97.8 °F (36.6 °C)-97.9 °F (36.6 °C)] 97.9 °F (36.6 °C)  Pulse:  [] 94  Resp:  [18] 18  SpO2:  [97 %-100 %] 100 %  BP: (131-160)/(63-76) 133/63     Weight: 74.8 kg (165 lb)  Height: 5' 7.5" (171.5 cm)  Body mass index is 25.46 kg/m².    No intake or output data in the 24 hours ending 09/02/24 2251     Ortho/SPM Exam  Gen:  No acute distress  CV:  Peripherally well-perfused.    Pulm:  Normal respiratory effort.  Head/Neck:  Normocephalic.  Atraumatic.     Spine/pelvis/axial " body:  Midline lumbar surgical incision with Prenio tape in place.  However, the Prineo tape has somewhat peeled up from the caudal most aspect of the incision and there is a very small, 1 mm, area with expressible serous drainage.  He also has a 1.5-2 cm incision posteriorly over the iliac crest that has well healing.  Neither these incisions show any sign of yesi dehiscence.  Surrounding the incision, there is no erythema, induration, or skin breakdown.  No tenderness to palpation of cervical, thoracic, or lumbar spine  No pain with compression of pelvis  No chest wall or abdominal tenderness  No decubitus ulcers    Upper Extremity Motor:                                                                                                  RIGHT             LEFT  Deltoid(C5)                                             5/5                  5/5  Biceps(C5)                                             5/5                  5/5  Extensor Carpi Radialis Longus(C6)     5/5                  5/5       Triceps(C7)                                            5/5                  5/5                               Flexor Carpi Radialis(C7)                      5/5                  5/5  Flexor Digitorum Superficialis(C8)         5/5                  5/5  Interossei(T1)                                         5/5                  5/5          Upper Extremity Sensation (a=absent, i=impaired, n=normal):                                                              RIGHT             LEFT  C5 dermatome                                    n                         n  C6 dermatome                                    n                         n  C7 dermatome                                    n                         n  C8 dermatome                                    n                         n  T1 dermatome                                    n                         n     Lower Extremity Motor:                                                                                                                 RIGHT             LEFT  Iliopsoas (L2,3)                                      5/5                  5/5  Quadriceps (L3,4)                                  5/5                  5/5   Tibialis Anterior (L4.5)                            5/5                  5/5       Extensor Halucis Longus (L5)               5/5                  5/5                               Gastrocnemius/Soleus (S1)                   5/5                  5/5  Flexor Hallucis Longus(S2)                   5/5                  5/5     Lower Extremity Sensation (a=absent, i=impaired, n=normal):                                                              RIGHT             LEFT  L2 dermatome                                         n                     n  L3 dermatome                                         n                     n  L4 dermatome                                         n                     n  L5 dermatome                                         n                     n  S1 dermatome                                        n                     n  S2 dermatome                                        n                     n        Significant Labs: All pertinent labs within the past 24 hours have been reviewed.    Significant Imaging: I have reviewed and interpreted all pertinent imaging results/findings.

## 2024-09-04 ENCOUNTER — TELEPHONE (OUTPATIENT)
Dept: ORTHOPEDICS | Facility: CLINIC | Age: 74
End: 2024-09-04
Payer: MEDICARE

## 2024-09-04 NOTE — TELEPHONE ENCOUNTER
Returned pt's call and he states the wound vac is beeping. HH nurse is at home and states she tried to reinforce the dressing with no success. Given the number to the [employer] wound vac rep to call and help trouble shoot, both pt and HH nurse verbalized understanding.    ----- Message from Alma Mcfarlane sent at 9/4/2024  3:07 PM CDT -----  Regarding: PT IS CALLING TO SPEAK TO GAY  REGARDING SPONGE VAC MACHINE BUZZING  Contact: PT  Pt is stating that machine is buzzing..  Pt's requesting a call back as soon as possible. Home tana os currently there.     Confirmed contact info below:  Contact Name: David Solomon  Phone Number: [phone]

## 2024-09-04 NOTE — PLAN OF CARE
[facility]  Discharge Final Note    Primary Care Provider: No, Primary Doctor    Expected Discharge Date: 8/29/2024    Final Discharge Note (most recent)       Final Note - 08/29/24 1625          Final Note    Assessment Type Final Discharge Note     Anticipated Discharge Disposition Home-Health Care [facility] Resources/Appts/Education Provided Provided patient/caregiver with written discharge plan information;Provided education on problems/symptoms using teachback                   Future Appointments   Date Time Provider Department Center   9/6/2024 11:45 AM Gris Peter PA-C [facility] SPINE [facility]   9/24/2024  9:30 AM [facility]   9/24/2024 10:30 AM Paulette Wahl PA-C [facility] SPINE [facility]     Contact Info       [facility]    [address], [address]  [facility] [address]   Phone: [phone]       Next Steps: Follow up

## 2024-09-06 ENCOUNTER — OFFICE VISIT (OUTPATIENT)
Dept: ORTHOPEDICS | Facility: CLINIC | Age: 74
End: 2024-09-06
Payer: MEDICARE

## 2024-09-06 VITALS — WEIGHT: 164.88 LBS | BODY MASS INDEX: 24.99 KG/M2 | HEIGHT: 68 IN

## 2024-09-06 DIAGNOSIS — T81.89XA DELAYED SURGICAL WOUND HEALING, INITIAL ENCOUNTER: Primary | ICD-10-CM

## 2024-09-06 PROCEDURE — 1101F PT FALLS ASSESS-DOCD LE1/YR: CPT | Mod: CPTII,S$GLB,, | Performed by: ORTHOPAEDIC SURGERY

## 2024-09-06 PROCEDURE — 4010F ACE/ARB THERAPY RXD/TAKEN: CPT | Mod: CPTII,S$GLB,, | Performed by: ORTHOPAEDIC SURGERY

## 2024-09-06 PROCEDURE — 99024 POSTOP FOLLOW-UP VISIT: CPT | Mod: S$GLB,,, | Performed by: ORTHOPAEDIC SURGERY

## 2024-09-06 PROCEDURE — 3044F HG A1C LEVEL LT 7.0%: CPT | Mod: CPTII,S$GLB,, | Performed by: ORTHOPAEDIC SURGERY

## 2024-09-06 PROCEDURE — 1125F AMNT PAIN NOTED PAIN PRSNT: CPT | Mod: CPTII,S$GLB,, | Performed by: ORTHOPAEDIC SURGERY

## 2024-09-06 PROCEDURE — 99999 PR PBB SHADOW E&M-EST. PATIENT-LVL III: CPT | Mod: PBBFAC,,, | Performed by: ORTHOPAEDIC SURGERY

## 2024-09-06 PROCEDURE — 3288F FALL RISK ASSESSMENT DOCD: CPT | Mod: CPTII,S$GLB,, | Performed by: ORTHOPAEDIC SURGERY

## 2024-09-06 NOTE — PROGRESS NOTES
Date: 09/06/2024    Supervising Physician: Suresh Yeh M.D.    Date of Surgery: 8/27/24    Procedure: L4-5 TLIF    History: David Solomon is seen today for follow-up following the above listed procedure. Overall the patient is doing well but today notes he was seen in the ED 9/2/24 for wound drainage and a wound vac was placed.   Pain is well controlled with current pain medication.  he denies fever, chills, and sweats since the time of the surgery.     Exam: Post op dressing taken down.  Incision is healing well, clean, dry and intact.   There is no sign of infection. Neuro exam is stable. No signs of DVT.    Radiographs: n/a    Assessment/Plan: 1.5 weeks post op.    Doing well postoperatively.  reviewed.    Will have him come back Monday to have wound vac removed    Thank you for the opportunity to participate in this patient's care. Please give me a call if there are any concerns or questions.

## 2024-09-09 ENCOUNTER — OFFICE VISIT (OUTPATIENT)
Dept: ORTHOPEDICS | Facility: CLINIC | Age: 74
End: 2024-09-09
Payer: MEDICARE

## 2024-09-09 ENCOUNTER — PATIENT MESSAGE (OUTPATIENT)
Dept: ORTHOPEDICS | Facility: CLINIC | Age: 74
End: 2024-09-09

## 2024-09-09 VITALS — HEIGHT: 68 IN | BODY MASS INDEX: 24.26 KG/M2 | WEIGHT: 160.06 LBS

## 2024-09-09 DIAGNOSIS — T81.89XA DELAYED SURGICAL WOUND HEALING, INITIAL ENCOUNTER: Primary | ICD-10-CM

## 2024-09-09 PROCEDURE — 1125F AMNT PAIN NOTED PAIN PRSNT: CPT | Mod: CPTII,S$GLB,, | Performed by: ORTHOPAEDIC SURGERY

## 2024-09-09 PROCEDURE — 3288F FALL RISK ASSESSMENT DOCD: CPT | Mod: CPTII,S$GLB,, | Performed by: ORTHOPAEDIC SURGERY

## 2024-09-09 PROCEDURE — 3044F HG A1C LEVEL LT 7.0%: CPT | Mod: CPTII,S$GLB,, | Performed by: ORTHOPAEDIC SURGERY

## 2024-09-09 PROCEDURE — 4010F ACE/ARB THERAPY RXD/TAKEN: CPT | Mod: CPTII,S$GLB,, | Performed by: ORTHOPAEDIC SURGERY

## 2024-09-09 PROCEDURE — 1101F PT FALLS ASSESS-DOCD LE1/YR: CPT | Mod: CPTII,S$GLB,, | Performed by: ORTHOPAEDIC SURGERY

## 2024-09-09 PROCEDURE — 1159F MED LIST DOCD IN RCRD: CPT | Mod: CPTII,S$GLB,, | Performed by: ORTHOPAEDIC SURGERY

## 2024-09-09 PROCEDURE — 99999 PR PBB SHADOW E&M-EST. PATIENT-LVL III: CPT | Mod: PBBFAC,,, | Performed by: ORTHOPAEDIC SURGERY

## 2024-09-09 PROCEDURE — 99024 POSTOP FOLLOW-UP VISIT: CPT | Mod: S$GLB,,, | Performed by: ORTHOPAEDIC SURGERY

## 2024-09-09 RX ORDER — SULFAMETHOXAZOLE AND TRIMETHOPRIM 800; 160 MG/1; MG/1
1 TABLET ORAL 2 TIMES DAILY
Qty: 14 TABLET | Refills: 0 | Status: SHIPPED | OUTPATIENT
Start: 2024-09-09 | End: 2024-09-09

## 2024-09-09 RX ORDER — SULFAMETHOXAZOLE AND TRIMETHOPRIM 800; 160 MG/1; MG/1
1 TABLET ORAL 2 TIMES DAILY
Qty: 14 TABLET | Refills: 0 | Status: SHIPPED | OUTPATIENT
Start: 2024-09-09

## 2024-09-09 NOTE — PROGRESS NOTES
Date: 09/09/2024    Supervising Physician: Suresh Yeh M.D.    Date of Surgery: 8/27/24    Procedure: L4-5 TLIF    History: David Solomon is seen today for follow-up following the above listed procedure. Overall the patient is doing well but today notes he is doing well with minimal pain.   Pain is well controlled with current pain medication.  he denies fever, chills, and sweats since the time of the surgery.       Exam: Post op dressing taken down.  Incision is healing well, clean, dry and intact.   There is no sign of infection. Neuro exam is stable. No signs of DVT.    Radiographs: n/a    Assessment/Plan: 2 weeks post op.    Wound vac removed. Will do betadine paint BID for one week and send bactrim to pharmacy    Thank you for the opportunity to participate in this patient's care. Please give me a call if there are any concerns or questions.

## 2024-09-18 NOTE — PROGRESS NOTES
Date: 09/18/2024    Supervising Physician: Suresh Yeh M.D.    Date of Surgery: 8/27/24     Procedure: L4-5 TLIF    History: David Solomon is seen today for follow-up following the above listed procedure. Overall the patient is doing well but today notes he continues to do well with improvement of his pre op pain.   Pain is well controlled with current pain medication.  he denies fever, chills, and sweats since the time of the surgery.       Exam: Post op dressing taken down.  Incision is healing well, clean, dry and intact.   There is no sign of infection. Neuro exam is stable. No signs of DVT.    Radiographs: hardware in place no failure    Assessment/Plan: 4 weeks post op.    Doing well postoperatively.  reviewed.    I will plan to see the patient back for the next postop visit in 2 months.     Thank you for the opportunity to participate in this patient's care. Please give me a call if there are any concerns or questions.

## 2024-09-24 ENCOUNTER — OFFICE VISIT (OUTPATIENT)
Dept: ORTHOPEDICS | Facility: CLINIC | Age: 74
End: 2024-09-24
Payer: MEDICARE

## 2024-09-24 ENCOUNTER — PATIENT MESSAGE (OUTPATIENT)
Dept: ORTHOPEDICS | Facility: CLINIC | Age: 74
End: 2024-09-24

## 2024-09-24 ENCOUNTER — HOSPITAL ENCOUNTER (OUTPATIENT)
Dept: RADIOLOGY | Facility: HOSPITAL | Age: 74
Discharge: HOME | End: 2024-09-24
Attending: ORTHOPAEDIC SURGERY
Payer: MEDICARE

## 2024-09-24 VITALS — BODY MASS INDEX: 24.26 KG/M2 | HEIGHT: 68 IN | WEIGHT: 160.06 LBS

## 2024-09-24 DIAGNOSIS — Z98.1 S/P LUMBAR SPINAL FUSION: Primary | ICD-10-CM

## 2024-09-24 DIAGNOSIS — Z98.1 S/P LUMBAR SPINAL FUSION: ICD-10-CM

## 2024-09-24 PROCEDURE — 72100 X-RAY EXAM L-S SPINE 2/3 VWS: CPT | Mod: TC

## 2024-09-24 PROCEDURE — 1125F AMNT PAIN NOTED PAIN PRSNT: CPT | Mod: CPTII,S$GLB,, | Performed by: ORTHOPAEDIC SURGERY

## 2024-09-24 PROCEDURE — 3288F FALL RISK ASSESSMENT DOCD: CPT | Mod: CPTII,S$GLB,, | Performed by: ORTHOPAEDIC SURGERY

## 2024-09-24 PROCEDURE — 4010F ACE/ARB THERAPY RXD/TAKEN: CPT | Mod: CPTII,S$GLB,, | Performed by: ORTHOPAEDIC SURGERY

## 2024-09-24 PROCEDURE — 99024 POSTOP FOLLOW-UP VISIT: CPT | Mod: S$GLB,,, | Performed by: ORTHOPAEDIC SURGERY

## 2024-09-24 PROCEDURE — 1159F MED LIST DOCD IN RCRD: CPT | Mod: CPTII,S$GLB,, | Performed by: ORTHOPAEDIC SURGERY

## 2024-09-24 PROCEDURE — 3044F HG A1C LEVEL LT 7.0%: CPT | Mod: CPTII,S$GLB,, | Performed by: ORTHOPAEDIC SURGERY

## 2024-09-24 PROCEDURE — 99999 PR PBB SHADOW E&M-EST. PATIENT-LVL III: CPT | Mod: PBBFAC,,, | Performed by: ORTHOPAEDIC SURGERY

## 2024-09-24 PROCEDURE — 72100 X-RAY EXAM L-S SPINE 2/3 VWS: CPT | Mod: 26,,, | Performed by: RADIOLOGY

## 2024-09-24 PROCEDURE — 1101F PT FALLS ASSESS-DOCD LE1/YR: CPT | Mod: CPTII,S$GLB,, | Performed by: ORTHOPAEDIC SURGERY

## 2024-09-24 RX ORDER — PREGABALIN 50 MG/1
50 CAPSULE ORAL 3 TIMES DAILY
Qty: 90 CAPSULE | Refills: 2 | Status: SHIPPED | OUTPATIENT
Start: 2024-09-24 | End: 2025-03-25

## 2024-09-24 NOTE — LETTER
September 24, 2024      [id number]  [address]  [facility] [address]  Phone: [phone]       Patient: David Solomon   YOB: 1950  Date of Visit: 09/24/2024    To Whom It May Concern:    Brittanie Solomon  was at [facility] on 09/24/2024. The patient is clear for dental procedures. If you have any questions or concerns, or if I can be of further assistance, please do not hesitate to contact me.    Sincerely,    Gris Peter PA-C

## 2024-09-30 ENCOUNTER — EXTERNAL HOME HEALTH (OUTPATIENT)
Dept: HOME HEALTH SERVICES | Facility: HOSPITAL | Age: 74
End: 2024-09-30
Payer: MEDICARE

## 2024-10-01 ENCOUNTER — PATIENT MESSAGE (OUTPATIENT)
Dept: ORTHOPEDICS | Facility: CLINIC | Age: 74
End: 2024-10-01
Payer: MEDICARE

## 2024-10-21 ENCOUNTER — PATIENT MESSAGE (OUTPATIENT)
Dept: ORTHOPEDICS | Facility: CLINIC | Age: 74
End: 2024-10-21
Payer: MEDICARE

## 2024-10-22 ENCOUNTER — PATIENT MESSAGE (OUTPATIENT)
Dept: ORTHOPEDICS | Facility: CLINIC | Age: 74
End: 2024-10-22
Payer: MEDICARE

## 2024-10-23 DIAGNOSIS — M51.369 DEGENERATION OF INTERVERTEBRAL DISC OF LUMBAR REGION, UNSPECIFIED WHETHER PAIN PRESENT: Primary | ICD-10-CM

## 2024-10-24 ENCOUNTER — HOSPITAL ENCOUNTER (OUTPATIENT)
Dept: RADIOLOGY | Facility: HOSPITAL | Age: 74
Discharge: HOME | End: 2024-10-24
Attending: ORTHOPAEDIC SURGERY
Payer: MEDICARE

## 2024-10-24 ENCOUNTER — OFFICE VISIT (OUTPATIENT)
Dept: ORTHOPEDICS | Facility: CLINIC | Age: 74
End: 2024-10-24
Payer: MEDICARE

## 2024-10-24 DIAGNOSIS — M51.369 DEGENERATION OF INTERVERTEBRAL DISC OF LUMBAR REGION, UNSPECIFIED WHETHER PAIN PRESENT: ICD-10-CM

## 2024-10-24 DIAGNOSIS — Z98.1 S/P LUMBAR SPINAL FUSION: Primary | ICD-10-CM

## 2024-10-24 PROCEDURE — 4010F ACE/ARB THERAPY RXD/TAKEN: CPT | Mod: CPTII,95,, | Performed by: ORTHOPAEDIC SURGERY

## 2024-10-24 PROCEDURE — 3044F HG A1C LEVEL LT 7.0%: CPT | Mod: CPTII,95,, | Performed by: ORTHOPAEDIC SURGERY

## 2024-10-24 PROCEDURE — 99024 POSTOP FOLLOW-UP VISIT: CPT | Mod: 95,,, | Performed by: ORTHOPAEDIC SURGERY

## 2024-10-24 PROCEDURE — 72100 X-RAY EXAM L-S SPINE 2/3 VWS: CPT | Mod: 26,,, | Performed by: RADIOLOGY

## 2024-10-24 PROCEDURE — 72100 X-RAY EXAM L-S SPINE 2/3 VWS: CPT | Mod: TC

## 2024-10-24 NOTE — PROGRESS NOTES
Established Patient - Audio Only Telehealth Visit     The patient location is: home  The chief complaint leading to consultation is: post op  Visit type: Virtual visit with audio only (telephone)  Total time spent with patient: 10 min       The reason for the audio only service rather than synchronous audio and video virtual visit was related to technical difficulties or patient preference/necessity.     Each patient to whom I provide medical services by telemedicine is:  (1) informed of the relationship between the physician and patient and the respective role of any other health care provider with respect to management of the patient; and (2) notified that they may decline to receive medical services by telemedicine and may withdraw from such care at any time. Patient verbally consented to receive this service via voice-only telephone call.    Date: 10/24/2024    Supervising Physician: Suresh Yeh M.D.    Date of Surgery: 8/27/24     Procedure: L4-5 TLIF    History: David Solomon is seen today for follow-up following the above listed procedure. Overall the patient is doing well but today notes he continues to have left leg pain. He has been taking care of his wife who recently broke her hip.   Pain is well controlled with current pain medication.  he denies fever, chills, and sweats since the time of the surgery.       Exam: Post op dressing taken down.  Incision is healing well, clean, dry and intact.   There is no sign of infection. Neuro exam is stable. No signs of DVT.    Radiographs: hardware in place no failure    Assessment/Plan: 2 months post op.    Will continue lyrica and consider medrol dose pack.    I will plan to see the patient back for the next postop visit in 1 months.     Thank you for the opportunity to participate in this patient's care. Please give me a call if there are any concerns or questions.                   This service was not originating from a related E/M service provided within the  previous 7 days nor will  to an E/M service or procedure within the next 24 hours or my soonest available appointment.  Prevailing standard of care was able to be met in this audio-only visit.

## 2024-10-29 ENCOUNTER — PATIENT MESSAGE (OUTPATIENT)
Dept: ORTHOPEDICS | Facility: CLINIC | Age: 74
End: 2024-10-29
Payer: MEDICARE

## 2024-10-30 DIAGNOSIS — Z98.1 S/P LUMBAR FUSION: ICD-10-CM

## 2024-10-30 DIAGNOSIS — M54.9 DORSALGIA, UNSPECIFIED: Primary | ICD-10-CM

## 2024-11-03 ENCOUNTER — HOSPITAL ENCOUNTER (OUTPATIENT)
Dept: RADIOLOGY | Facility: HOSPITAL | Age: 74
Discharge: HOME | End: 2024-11-03
Attending: ORTHOPAEDIC SURGERY
Payer: MEDICARE

## 2024-11-03 DIAGNOSIS — M54.9 DORSALGIA, UNSPECIFIED: ICD-10-CM

## 2024-11-03 PROCEDURE — 25500020 PHARM REV CODE 255: Performed by: ORTHOPAEDIC SURGERY

## 2024-11-03 PROCEDURE — A9585 GADOBUTROL INJECTION: HCPCS | Performed by: ORTHOPAEDIC SURGERY

## 2024-11-03 PROCEDURE — 72158 MRI LUMBAR SPINE W/O & W/DYE: CPT | Mod: 26,,, | Performed by: RADIOLOGY

## 2024-11-03 PROCEDURE — 72158 MRI LUMBAR SPINE W/O & W/DYE: CPT | Mod: TC

## 2024-11-03 RX ORDER — GADOBUTROL 604.72 MG/ML
8 INJECTION INTRAVENOUS
Status: COMPLETED | OUTPATIENT
Start: 2024-11-03 | End: 2024-11-03

## 2024-11-03 RX ADMIN — GADOBUTROL 8 ML: 604.72 INJECTION INTRAVENOUS at 10:11

## 2024-11-04 ENCOUNTER — TELEPHONE (OUTPATIENT)
Dept: PAIN MEDICINE | Facility: CLINIC | Age: 74
End: 2024-11-04
Payer: MEDICARE

## 2024-11-04 ENCOUNTER — OFFICE VISIT (OUTPATIENT)
Dept: ORTHOPEDICS | Facility: CLINIC | Age: 74
End: 2024-11-04
Payer: MEDICARE

## 2024-11-04 DIAGNOSIS — M54.16 LUMBAR RADICULOPATHY: Primary | ICD-10-CM

## 2024-11-04 DIAGNOSIS — Z98.1 S/P LUMBAR FUSION: ICD-10-CM

## 2024-11-04 DIAGNOSIS — M79.605 LEFT LEG PAIN: ICD-10-CM

## 2024-11-04 DIAGNOSIS — M25.562 ACUTE PAIN OF LEFT KNEE: Primary | ICD-10-CM

## 2024-11-04 PROCEDURE — 4010F ACE/ARB THERAPY RXD/TAKEN: CPT | Mod: CPTII,95,, | Performed by: ORTHOPAEDIC SURGERY

## 2024-11-04 PROCEDURE — 3044F HG A1C LEVEL LT 7.0%: CPT | Mod: CPTII,95,, | Performed by: ORTHOPAEDIC SURGERY

## 2024-11-04 PROCEDURE — 99024 POSTOP FOLLOW-UP VISIT: CPT | Mod: 95,,, | Performed by: ORTHOPAEDIC SURGERY

## 2024-11-04 RX ORDER — OXYCODONE HYDROCHLORIDE 5 MG/1
5 TABLET ORAL EVERY 6 HOURS PRN
Qty: 21 TABLET | Refills: 0 | Status: SHIPPED | OUTPATIENT
Start: 2024-11-04

## 2024-11-04 NOTE — TELEPHONE ENCOUNTER
----- Message from Gris Hernandez PA-C sent at 2024  1:39 PM CST -----  Regarding: Order for SCOTTIE WILSON    Patient Name: SCOTTIE WILSON([id number])  Sex: Male  : 1950      PCP: DOMINIC, PRIMARY DOCTOR    Center: [facility] ([facility]     Types of orders made on 2024: Imaging, Medications, Procedure Request    Order Date:2024  Ordering User:GRIS HERNANDEZ [[id number]  Encounter Provider:Gris Hernandez PA-C [[id number]]  Authorizing Provider: Gris Hernandez PA-C [[id number]]  Supervising Provider:MARIFER BENÍTEZ [[id number]]  Type of Supervision:Supervision Required  Department:[facility][[id number]]    Common Order Information  Procedure -> Epidural Injection (specify level) Cmt: L5-S1 (AIM LEFT)    Order Specific Information  Order: Procedure Order to Pain Management [Custom: [id number]]  Order #:          [id number]: 1 FUTURE    Priority: Routine  Class: Clinic Performed    Future Order Information      Expires on:2025            Expected by:2024                   Associated Diagnoses      M79.605 Left leg pain      Facility Name: -> [facility]           Priority: Routine  Class: Clinic Performed    Future Order Information      Expires on:2025            Expected by:2024                   Associated Diagnoses      M79.605 Left leg pain      Procedure -> Epidural Injection (specify level) Cmt: L5-S1 (AIM LEFT)        Facility Name: -> [place]

## 2024-11-04 NOTE — H&P (VIEW-ONLY)
Established Patient - Audio Only Telehealth Visit     The patient location is: home  The chief complaint leading to consultation is: MRI results  Visit type: Virtual visit with audio only (telephone)  Total time spent with patient: 10 min     The reason for the audio only service rather than synchronous audio and video virtual visit was related to technical difficulties or patient preference/necessity.     Each patient to whom I provide medical services by telemedicine is:  (1) informed of the relationship between the physician and patient and the respective role of any other health care provider with respect to management of the patient; and (2) notified that they may decline to receive medical services by telemedicine and may withdraw from such care at any time. Patient verbally consented to receive this service via voice-only telephone call.    Date: 11/04/2024    Supervising Physician: Suresh Yeh M.D.    Date of Surgery:  8/27/24     Procedure: L4-5 TLIF    History: David Solomon is seen today for follow-up following the above listed procedure. Overall the patient is doing well but today notes he continues to have pain in his left knee that radiates to his left ankle. Pt says the pain feels similar to before surgery. He denies significant pain in his thigh.    he denies fever, chills, and sweats since the time of the surgery. Pt says there was a period of time after surgery that he was doing very well.    Exam: musculoskeletal and neuro exam stable    Radiographs: hardware in place no failure    MRI lumbar demonstrates good decompression at L4-5. Mild degenerative changes at L3-4 and L5-S1.    Assessment/Plan: 2 months post op.    Will try L5-S1 JUAN. Will order xrays of left knee and left tib/fib as well. Will refill oxycodone    Thank you for the opportunity to participate in this patient's care. Please give me a call if there are any concerns or questions.                       This service was not  originating from a related E/M service provided within the previous 7 days nor will  to an E/M service or procedure within the next 24 hours or my soonest available appointment.  Prevailing standard of care was able to be met in this audio-only visit.

## 2024-11-05 ENCOUNTER — HOSPITAL ENCOUNTER (OUTPATIENT)
Dept: RADIOLOGY | Facility: HOSPITAL | Age: 74
Discharge: HOME | End: 2024-11-05
Attending: ORTHOPAEDIC SURGERY
Payer: MEDICARE

## 2024-11-05 DIAGNOSIS — M25.562 ACUTE PAIN OF LEFT KNEE: ICD-10-CM

## 2024-11-05 DIAGNOSIS — M79.605 LEFT LEG PAIN: ICD-10-CM

## 2024-11-05 PROCEDURE — 73590 X-RAY EXAM OF LOWER LEG: CPT | Mod: TC,LT

## 2024-11-05 PROCEDURE — 73562 X-RAY EXAM OF KNEE 3: CPT | Mod: TC,RT

## 2024-11-06 ENCOUNTER — PATIENT MESSAGE (OUTPATIENT)
Dept: ORTHOPEDICS | Facility: CLINIC | Age: 74
End: 2024-11-06
Payer: MEDICARE

## 2024-11-08 ENCOUNTER — TELEPHONE (OUTPATIENT)
Dept: PAIN MEDICINE | Facility: CLINIC | Age: 74
End: 2024-11-08
Payer: MEDICARE

## 2024-11-13 NOTE — PRE-PROCEDURE INSTRUCTIONS
Patient reviewed on 11/13/2024.  Okay to proceed at [facility]. The following pre-procedure instructions and arrival time have been reviewed with patient via phone and sent to patient portal for review.  Patient verbalized an understanding.        Dear David,     Please read over the following pre-procedure instructions in it's entirety as there is helpful information here to get you well prepared for your upcoming procedure.     You are scheduled for a procedure with Dr. GIA Lowry on 11/15/2024.     [facility] at the corner of [facility] and [facility]. It is in the [place] next to [employer]. The address is: [address] [place] [address]. Take the elevator to the 2nd floor.       Registration check in time: 1:40 pm  Procedure scheduled for time: 2:40 pm     If you are receiving sedation, you CANNOT drive yourself and must have a responsible friend or family member (no rideshare) to drive you home.        You should take any medications that you routinely take for blood pressure, heart medications, thyroid, cholesterol, etc.      The fasting restrictions are dependent on whether or not you are receiving sedation. Sedation is not available for all procedures.      Your fasting instructions/Sedation type are as follow:  No sedation.  You do not need to fast before this procedure.  You can eat and drink like normal.  You can drive yourself (with stipulations).  There are some rare cases where you may need to call an uber if you are unable to drive after the procedure due to weakness/dizziness.         If you are on blood thinners, you need to follow the anticoagulation instructions that had been discussed previously. You should only stop the blood thinners if it was approved by your primary care physician or your cardiologist. In the event that you are not able to stop your blood thinners, a blood thinner was not listed on your medication list, or we were not able to get clearance from your  cardiologist, then the procedure may have to be postponed/canceled.      IF you were told to stop your blood thinners, this is how long you should generally hold some of the more common ones. Remember that stopping blood thinners is only necessary for certain procedures. If you are unsure of your instructions, please call us.   Aspirin - 5 days  Plavix/Clopidogrel - 7 days  Warfarin / Coumadin - 5 days  Eliquis - 3 days  Pradaxa/Dabigatran - 4 days  Xarelto/Rivaroxaban - 3 days     If you are a diabetic, do not take your medication if you will be fasting, but bring it with you. Please plan on being here for roughly 2-3 hours.      HOLD all non-insulin injections (shots) until after surgery (Ozempic, Mounjaro, Trulicity, Victoza, Byetta, Wegovy and Adlyxin) (Total of 7 days prior)       Please call us if you have been sick (running fever, having any flu-like symptoms) or have been taking ANTIBIOTICS in the past 2 weeks or had any outpatient procedures other than with us (colonoscopy, endoscopy, OBGYN, dental, etc.).      If you have been previously COVID positive, you will need to hold off on your procedure until you are symptom free for 10 days. If you did not have any symptoms, you can have your procedure 10 days from your positive test result.         On the morning of your procedure:  *HOLD ALL VITAMINS, MINERALS, HERBS (INCLUDING HERBAL TEAS) AND SUPPLEMENTS  *SHOWER WITH ANTIBACTERIAL SOAP (EX. DIAL) NIGHT BEFORE AND MORNING OF PROCEDURE  *DO NOT APPLY ANY LOTIONS, OILS, POWDERS, PERFUME/COLOGNE, OINTMENTS, GELS, CREAMS, MAKEUP OR DEODORANT TO YOUR SKIN MORNING OF PROCEDURE  *LEAVE JEWELRY AND ANY VALUABLES AT HOME  *WEAR LOOSE COMFORTABLE CLOTHING         Please reply to this portal message as receipt of delivery.     Thank you,  [facility]  [place], LPN [facility]  Pre-Admit

## 2024-11-15 ENCOUNTER — HOSPITAL ENCOUNTER (OUTPATIENT)
Facility: HOSPITAL | Age: 74
Discharge: HOME | End: 2024-11-15
Attending: STUDENT IN AN ORGANIZED HEALTH CARE EDUCATION/TRAINING PROGRAM | Admitting: STUDENT IN AN ORGANIZED HEALTH CARE EDUCATION/TRAINING PROGRAM
Payer: MEDICARE

## 2024-11-15 VITALS
SYSTOLIC BLOOD PRESSURE: 176 MMHG | TEMPERATURE: 98 F | OXYGEN SATURATION: 100 % | RESPIRATION RATE: 18 BRPM | DIASTOLIC BLOOD PRESSURE: 77 MMHG | HEART RATE: 76 BPM

## 2024-11-15 DIAGNOSIS — M54.16 LUMBAR RADICULOPATHY: Primary | ICD-10-CM

## 2024-11-15 DIAGNOSIS — G89.29 CHRONIC PAIN: ICD-10-CM

## 2024-11-15 PROCEDURE — 25500020 PHARM REV CODE 255: Performed by: STUDENT IN AN ORGANIZED HEALTH CARE EDUCATION/TRAINING PROGRAM

## 2024-11-15 PROCEDURE — 63600175 PHARM REV CODE 636 W HCPCS: Performed by: STUDENT IN AN ORGANIZED HEALTH CARE EDUCATION/TRAINING PROGRAM

## 2024-11-15 PROCEDURE — 62323 NJX INTERLAMINAR LMBR/SAC: CPT | Performed by: STUDENT IN AN ORGANIZED HEALTH CARE EDUCATION/TRAINING PROGRAM

## 2024-11-15 PROCEDURE — 62323 NJX INTERLAMINAR LMBR/SAC: CPT | Mod: ,,, | Performed by: STUDENT IN AN ORGANIZED HEALTH CARE EDUCATION/TRAINING PROGRAM

## 2024-11-15 PROCEDURE — 82962 GLUCOSE BLOOD TEST: CPT | Performed by: STUDENT IN AN ORGANIZED HEALTH CARE EDUCATION/TRAINING PROGRAM

## 2024-11-15 RX ORDER — LIDOCAINE HYDROCHLORIDE 20 MG/ML
INJECTION, SOLUTION EPIDURAL; INFILTRATION; INTRACAUDAL; PERINEURAL
Status: DISCONTINUED | OUTPATIENT
Start: 2024-11-15 | End: 2024-11-15 | Stop reason: HOSPADM

## 2024-11-15 RX ORDER — ALPRAZOLAM 0.5 MG/1
0.5 TABLET, ORALLY DISINTEGRATING ORAL ONCE AS NEEDED
Status: DISCONTINUED | OUTPATIENT
Start: 2024-11-15 | End: 2024-11-15 | Stop reason: HOSPADM

## 2024-11-15 RX ORDER — DEXAMETHASONE SODIUM PHOSPHATE 10 MG/ML
INJECTION INTRAMUSCULAR; INTRAVENOUS
Status: DISCONTINUED | OUTPATIENT
Start: 2024-11-15 | End: 2024-11-15 | Stop reason: HOSPADM

## 2024-11-15 RX ORDER — LIDOCAINE HYDROCHLORIDE 10 MG/ML
INJECTION, SOLUTION EPIDURAL; INFILTRATION; INTRACAUDAL; PERINEURAL
Status: DISCONTINUED | OUTPATIENT
Start: 2024-11-15 | End: 2024-11-15 | Stop reason: HOSPADM

## 2024-11-15 NOTE — OP NOTE
Lumbar Interlaminar Epidural Steroid Injection under Fluoroscopic Guidance    The procedure, risks, benefits, and options were discussed with the patient. There are no contraindications to the procedure. The patent expressed understanding and agreed to the procedure. Informed written consent was obtained prior to the start of the procedure and can be found in the patient's chart.    PATIENT NAME: David Solomon   MRN: [id number]     DATE OF PROCEDURE: 11/15/2024    PROCEDURE: Lumbar Interlaminar Epidural Steroid Injection L5/S1 under Fluoroscopic Guidance    PRE-OP DIAGNOSIS: Lumbar radiculopathy [M54.16] Lumbar radiculopathy [M54.16]    POST-OP DIAGNOSIS: Same    PHYSICIAN: Tomeka Lowry MD    ASSISTANTS: None     MEDICATIONS INJECTED: Preservative-free Decadron 10mg with 4cc of Lidocaine 1% MPF and preservative free normal saline    LOCAL ANESTHETIC INJECTED: Xylocaine 2%     SEDATION: None    ESTIMATED BLOOD LOSS: None    COMPLICATIONS: None    TECHNIQUE: Time-out was performed to identify the patient and procedure to be performed. With the patient laying in a prone position, the surgical area was prepped and draped in the usual sterile fashion using ChloraPrep and a fenestrated drape. The level was determined under fluoroscopy guidance. Skin anesthesia was achieved by injecting Lidocaine 2% over the injection site. The interlaminar space was then approached with a 20 gauge,  3.5 inch Tuohy needle that was introduced under fluoroscopic guidance in the AP, lateral and/or contralateral oblique imaging. Once the Ligamentum flavum was encountered loss of resistance to saline was used to enter the epidural space. With positive loss of resistance and negative aspiration for CSF or Blood, contrast dye Omnipaque (300mg/mL) was injected to confirm placement and there was no vascular runoff. 5 mL of the medication mixture listed above was injected slowly. Displacement of the radio opaque contrast after injection of the  medication confirmed that the medication went into the area of the epidural space. The needles were removed and bleeding was nil. A sterile dressing was applied. No specimens collected. The patient tolerated the procedure well.     The patient was monitored after the procedure in the recovery area. They were given post-procedure and discharge instructions to follow at home. The patient was discharged in a stable condition.    Tomeka Lowry MD

## 2024-11-15 NOTE — DISCHARGE INSTRUCTIONS
[facility]  Dr. Tomeka Lowry  Messaging service # [phone]    POST-PROCEDURE INSTRUCTIONS:  HELPFUL TIPS:    Except for block procedures (medial branch blocks, genicular blocks, hip/shoulder blocks), most procedures take about 2 weeks to start seeing the full effect toward your pain.  If you feel like the pain relief goes away after a day, please wait up to 2 weeks to decide if the procedure provided you any relief    If you had a block procedure (medial branch blocks, genicular blocks, hip/shoulder blocks), you MUST submit your pain diary and percentage relief scores to proceed with the next block and/or procedure.  Please submit the diary/percentages through the [facility] OR you can call [phone] ([facility]) OR [phone] ([facility]) during clinic hours (Monday - Friday, 8AM - 5PM)    Follow up in 2 weeks with either the provider that suggested this procedure OR with Dr. Lowry (including his team members at [facility]).  You may already have a follow up appointment scheduled.  If not, you may make an appointment through the [facility] or you can contact the office that suggested your procedure.  If you would like to make an appointment with Dr. Lowry, you may do so through the [facility] Portal OR you can call [phone] ([facility]) OR [phone] ([employer]) during clinic hours (Monday - Friday, 8AM - 5PM).      What you need to do:    Keep a record of your response to the injection you had today.    How much relief did you get?   When did the relief start and how long did it last?  Were you able to decrease the use of any of your pain medications?  Were you able to increase your level of activity?  How long did the relief last?    What to watch out for:    If you experience any of the following symptoms after your procedure, please notify the messaging service immediately (see above for contact information):   fever (increased oral temperature)   bleeding or  swelling at the injection site,    drainage, rash or redness at the injection site    possible signs of infection    increased pain at the injection site   worsening of your usual pain   severe headache   new or worsening numbness    new arm and/or leg weakness, or    changes in bowel and/or bladder function: urinating or defecating on yourself and not knowing that you did it.    PLEASE FOLLOW ALL INSTRUCTIONS CAREFULLY     Do not engage in strenuous activity (e.g., lifting or pushing heavy objects or repeated bending) for 24 hours.     Do not take a bath, swim or use Jacuzzi for 24 hours after procedure. (A shower is fine).   Remove any Band-Aids when you get home.    Use cold/ice, as needed for comfort.  We recommend the use of cold therapy alternating on for 20 minutes, off for 20 minutes.    Do not apply direct heat (heating pad or heat packs) to the injection site for 24 hours.     Resume your usual medications, unless instructed otherwise by your Pain Physician.     If you are on warfarin (Coumadin) or other blood thinner, resume this medication as instructed by your prescribing Physician.    IF AT ANY POINT YOU ARE VERY CONCERNED ABOUT YOUR SYMPTOMS, PLEASE GO TO THE EMERGENCY ROOM.    If you develop worsening pain, weakness, numbness, lose bowel or bladder control (i.e., having an accident where you did not even know you had to go to the bathroom and suddenly noticed you soiled yourself), saddle anesthesia (a loss of sensation restricted to the area of the buttocks, anus and between the legs -- i.e., those parts of your body that would touch a saddle if you were sitting on one) you need to go immediately to the emergency department for evaluation and treatment.    ----------------------------------------------------------------------------------------------------------------------------------------------------------------  If you received Sedation please read the following instructions:  POST SEDATION  INSTRUCTIONS    Today you received intravenous medication (also known as sedation) that was used to help you relax and/or decrease discomfort during your procedure. This medication will be acting in your body for the next 24 hours, so you might feel a little tired or sleepy. This feeling will slowly wear off.   Common side effects associated with these medications include: drowsiness, dizziness, sleepiness, confusion, feeling excited, difficulty remembering things, lack of steadiness with walking or balance, loss of fine muscle control, slowed reflexes, difficulty focusing, and blurred vision.  Some over-the-counter and prescription medications (e.g., muscle relaxants, opioids, mood-altering medications, sedatives/hypnotics, antihistamines) can interact with the intravenous medication you received and cause an increased risk of the side effects listed above in addition to other potentially life threatening side effects. Use extreme caution if you are taking such medications, and consult with your Pain Physician or prescribing physician if you have any questions.  For the next 12-24 hours:    DO NOT--Drive a car, operate machinery or power tools   DO NOT--Drink any alcoholic beverages (not even beer), they may dangerously increase the risk of side effects.    DO NOT--Make any important legal or business decisions or sign important documents.  We advise you to have someone to assist you at home. Move slowly and carefully. Do not make sudden changes in position. Be aware of dizziness or light-headedness and move accordingly.   If you seek medical treatment within 24 hours, let the nurse or doctor caring for you know that you have received the above medications. If you have any questions or concerns related to your sedation or treatment today please contact us.

## 2024-11-15 NOTE — DISCHARGE SUMMARY
Discharge Note  Short Stay      SUMMARY     Admit Date: 11/15/2024    Attending Physician: Tomeka Lowry MD PhD    Discharge Physician: Tomeka Lowry      Discharge Date: 11/15/2024 2:47 PM    Procedure(s) (LRB):  L5-S1 (AIM LEFT) (N/A)    Final Diagnosis: Lumbar radiculopathy [M54.16]    Disposition: Home or self care    Patient Instructions:   Current Discharge Medication List        CONTINUE these medications which have NOT CHANGED    Details   amitriptyline (ELAVIL) 10 MG tablet TAKE 1 TABLET BY MOUTH ONCE DAILY AT NIGHT AT BEDTIME      amLODIPine (NORVASC) 10 MG tablet Take 10 mg by mouth.      atorvastatin (LIPITOR) 40 MG tablet Take 40 mg by mouth every evening.      diltiaZEM (CARDIZEM LA) 180 mg 24 hr tablet Take 180 mg by mouth once daily.      GLUCAGON EMERGENCY KIT, HUMAN, 1 mg injection Inject 1 mL into the muscle.      HUMALOG KWIKPEN INSULIN 100 unit/mL pen Inject into the skin.      hydroCHLOROthiazide (HYDRODIURIL) 12.5 MG Tab Take 12.5 mg by mouth once daily.      insulin glargine (LANTUS) 100 unit/mL injection Inject 25 Units into the skin once daily.      irbesartan (AVAPRO) 75 MG tablet Take 75 mg by mouth every evening.      pregabalin (LYRICA) 50 MG capsule Take 1 capsule (50 mg total) by mouth 3 (three) times daily.  Qty: 90 capsule, Refills: 2      celecoxib (CELEBREX) 200 MG capsule Take 1 capsule (200 mg total) by mouth once daily.  Qty: 30 capsule, Refills: 0      docusate sodium (COLACE) 100 MG capsule Take 1 capsule (100 mg total) by mouth 2 (two) times daily as needed for Constipation.  Qty: 30 capsule, Refills: 0      ondansetron (ZOFRAN-ODT) 4 MG TbDL Dissolve 2 tablets (8 mg total) by mouth every 8 (eight) hours as needed.  Qty: 30 tablet, Refills: 0      oxyCODONE (ROXICODONE) 5 MG immediate release tablet Take 1 tablet (5 mg total) by mouth every 6 (six) hours as needed for Pain.  Qty: 21 tablet, Refills: 0    Comments: Quantity prescribed more than 7 day supply? No  Associated  Diagnoses: S/P lumbar fusion      sulfamethoxazole-trimethoprim 800-160mg (BACTRIM DS) 800-160 mg Tab Take 1 tablet by mouth 2 (two) times daily.  Qty: 14 tablet, Refills: 0                 Discharge Diagnosis: Lumbar radiculopathy [M54.16]  Condition on Discharge: Stable with no complications to procedure   Diet on Discharge: Same as before.  Activity: as per instruction sheet.  Discharge to: Home with a responsible adult.  Follow up: 2-4 weeks       Please call my office or pager at [phone] if experienced any weakness or loss of sensation, fever > 101.5, pain uncontrolled with oral medications, persistent nausea/vomiting/or diarrhea, redness or drainage from the incisions, or any other worrisome concerns. If physician on call was not reached or could not communicate with our office for any reason please go to the nearest emergency department      Tomeka Lowry MD PhD

## 2024-11-15 NOTE — PLAN OF CARE
Discharge instructions given to patient,  verbalized understanding of all.  VSS, denies n/v and tolerating PO, rates pain level tolerable. Patient discharge home.

## 2024-11-18 ENCOUNTER — TELEPHONE (OUTPATIENT)
Dept: PAIN MEDICINE | Facility: CLINIC | Age: 74
End: 2024-11-18
Payer: MEDICARE

## 2024-11-18 LAB — POCT GLUCOSE: 200 MG/DL (ref 70–110)

## 2024-11-20 NOTE — PROGRESS NOTES
Date: 11/20/2024    Supervising Physician: Suresh Yeh M.D.    Date of Surgery: 8/27/24     Procedure: L4-5 TLIF    History: David Solomon is seen today for follow-up following the above listed procedure. Overall the patient is doing well but today notes he had an L5-S1 JUAN on 11/15/24 with moderate relief of his left leg pain.   He continues to have some aching in his left lower back and left leg.  he denies fever, chills, and sweats since the time of the surgery.       Exam: Post op dressing taken down.  Incision is healing well, clean, dry and intact.   There is no sign of infection. Neuro exam is stable. No signs of DVT.    Radiographs: hardware in place no failure    MRI lumbar demonstrates good decompression at L4-5. Mild degenerative changes at L3-4 and L5-S1.     Assessment/Plan: 3 months post op.    Doing well postoperatively.  reviewed. Will send PT orders to [facility], pt has no restrictions    I will plan to see the patient back for the next postop visit in 9 months.     Thank you for the opportunity to participate in this patient's care. Please give me a call if there are any concerns or questions.

## 2024-11-26 ENCOUNTER — OFFICE VISIT (OUTPATIENT)
Dept: ORTHOPEDICS | Facility: CLINIC | Age: 74
End: 2024-11-26
Payer: MEDICARE

## 2024-11-26 ENCOUNTER — HOSPITAL ENCOUNTER (OUTPATIENT)
Dept: RADIOLOGY | Facility: HOSPITAL | Age: 74
Discharge: HOME | End: 2024-11-26
Attending: ORTHOPAEDIC SURGERY
Payer: MEDICARE

## 2024-11-26 DIAGNOSIS — Z98.1 S/P LUMBAR SPINAL FUSION: ICD-10-CM

## 2024-11-26 DIAGNOSIS — Z98.1 S/P LUMBAR FUSION: Primary | ICD-10-CM

## 2024-11-26 PROCEDURE — 72100 X-RAY EXAM L-S SPINE 2/3 VWS: CPT | Mod: TC

## 2024-11-26 PROCEDURE — 72100 X-RAY EXAM L-S SPINE 2/3 VWS: CPT | Mod: 26,,, | Performed by: RADIOLOGY

## 2024-11-26 PROCEDURE — 99024 POSTOP FOLLOW-UP VISIT: CPT | Mod: S$GLB,,, | Performed by: ORTHOPAEDIC SURGERY

## 2024-11-26 PROCEDURE — 1125F AMNT PAIN NOTED PAIN PRSNT: CPT | Mod: CPTII,S$GLB,, | Performed by: ORTHOPAEDIC SURGERY

## 2024-11-26 PROCEDURE — 3288F FALL RISK ASSESSMENT DOCD: CPT | Mod: CPTII,S$GLB,, | Performed by: ORTHOPAEDIC SURGERY

## 2024-11-26 PROCEDURE — 99999 PR PBB SHADOW E&M-EST. PATIENT-LVL III: CPT | Mod: PBBFAC,,, | Performed by: ORTHOPAEDIC SURGERY

## 2024-11-26 PROCEDURE — 1159F MED LIST DOCD IN RCRD: CPT | Mod: CPTII,S$GLB,, | Performed by: ORTHOPAEDIC SURGERY

## 2024-11-26 PROCEDURE — 1101F PT FALLS ASSESS-DOCD LE1/YR: CPT | Mod: CPTII,S$GLB,, | Performed by: ORTHOPAEDIC SURGERY

## 2024-11-26 PROCEDURE — 3044F HG A1C LEVEL LT 7.0%: CPT | Mod: CPTII,S$GLB,, | Performed by: ORTHOPAEDIC SURGERY

## 2024-11-26 PROCEDURE — 4010F ACE/ARB THERAPY RXD/TAKEN: CPT | Mod: CPTII,S$GLB,, | Performed by: ORTHOPAEDIC SURGERY

## 2024-11-28 ENCOUNTER — PATIENT MESSAGE (OUTPATIENT)
Dept: ORTHOPEDICS | Facility: CLINIC | Age: 74
End: 2024-11-28
Payer: MEDICARE

## 2024-11-29 DIAGNOSIS — Z98.1 S/P LUMBAR FUSION: Primary | ICD-10-CM

## 2024-11-29 RX ORDER — OXYCODONE HYDROCHLORIDE 5 MG/1
5 TABLET ORAL EVERY 6 HOURS PRN
Qty: 28 TABLET | Refills: 0 | Status: SHIPPED | OUTPATIENT
Start: 2024-11-29

## 2024-12-01 ENCOUNTER — PATIENT MESSAGE (OUTPATIENT)
Dept: PAIN MEDICINE | Facility: CLINIC | Age: 74
End: 2024-12-01
Payer: MEDICARE

## 2024-12-02 ENCOUNTER — TELEPHONE (OUTPATIENT)
Dept: ORTHOPEDIC SURGERY | Facility: CLINIC | Age: 74
End: 2024-12-02
Payer: MEDICARE

## 2024-12-02 NOTE — TELEPHONE ENCOUNTER
Returned patient's call and left msg on voicemail with appt date/time. Instructed to contact clinic with any questions or concerns.      ----- Message from Gris Peter PA-C sent at 12/2/2024 11:37 AM CST -----  Can we squeeze this post op with him in clinic 12/11 or 12/16 :)    I can help see some of his NP's those days  ----- Message -----  From: Suresh Yeh MD  Sent: 12/2/2024  11:28 AM CST  To: Gris Peter PA-C    Extension fusion  ----- Message -----  From: Gris Peter PA-C  Sent: 12/2/2024   9:32 AM CST  To: Suresh Yeh MD    TLIF three months ago. Just did caudal JUAN with no relief. Recent message:    Since I saw you on Tuesday the pain in my left side (lower back, butt muscle and transmit down to the outside of my left ankle) just like before the epidural. Basically the epidural did not help. I am scheduled for therapy on Monday. The pain is bad to the extent where I will need some pain meds to get through it. [facility] gave me 10mg Oxy that helped however that was before my back surgery and they are gone. Can you please send in something so I can get through therapy and to help at home. Pain meds are helping however not like before. Maybe I need to see a neurologist if you have any recommendations. I am really struggling. Thanks    thoughts

## 2024-12-10 ENCOUNTER — TELEPHONE (OUTPATIENT)
Dept: PAIN MEDICINE | Facility: CLINIC | Age: 74
End: 2024-12-10

## 2024-12-10 ENCOUNTER — OFFICE VISIT (OUTPATIENT)
Dept: PAIN MEDICINE | Facility: CLINIC | Age: 74
End: 2024-12-10
Payer: MEDICARE

## 2024-12-10 VITALS
SYSTOLIC BLOOD PRESSURE: 163 MMHG | WEIGHT: 160.06 LBS | DIASTOLIC BLOOD PRESSURE: 61 MMHG | HEART RATE: 77 BPM | BODY MASS INDEX: 25.12 KG/M2 | HEIGHT: 67 IN

## 2024-12-10 DIAGNOSIS — M79.18 MYOFASCIAL PAIN SYNDROME: Primary | ICD-10-CM

## 2024-12-10 DIAGNOSIS — G89.4 CHRONIC PAIN DISORDER: ICD-10-CM

## 2024-12-10 DIAGNOSIS — M96.1 POSTLAMINECTOMY SYNDROME OF LUMBAR REGION: ICD-10-CM

## 2024-12-10 PROCEDURE — 3008F BODY MASS INDEX DOCD: CPT | Mod: CPTII,S$GLB,, | Performed by: STUDENT IN AN ORGANIZED HEALTH CARE EDUCATION/TRAINING PROGRAM

## 2024-12-10 PROCEDURE — 3044F HG A1C LEVEL LT 7.0%: CPT | Mod: CPTII,S$GLB,, | Performed by: STUDENT IN AN ORGANIZED HEALTH CARE EDUCATION/TRAINING PROGRAM

## 2024-12-10 PROCEDURE — 3077F SYST BP >= 140 MM HG: CPT | Mod: CPTII,S$GLB,, | Performed by: STUDENT IN AN ORGANIZED HEALTH CARE EDUCATION/TRAINING PROGRAM

## 2024-12-10 PROCEDURE — 3078F DIAST BP <80 MM HG: CPT | Mod: CPTII,S$GLB,, | Performed by: STUDENT IN AN ORGANIZED HEALTH CARE EDUCATION/TRAINING PROGRAM

## 2024-12-10 PROCEDURE — 1125F AMNT PAIN NOTED PAIN PRSNT: CPT | Mod: CPTII,S$GLB,, | Performed by: STUDENT IN AN ORGANIZED HEALTH CARE EDUCATION/TRAINING PROGRAM

## 2024-12-10 PROCEDURE — 3288F FALL RISK ASSESSMENT DOCD: CPT | Mod: CPTII,S$GLB,, | Performed by: STUDENT IN AN ORGANIZED HEALTH CARE EDUCATION/TRAINING PROGRAM

## 2024-12-10 PROCEDURE — 1101F PT FALLS ASSESS-DOCD LE1/YR: CPT | Mod: CPTII,S$GLB,, | Performed by: STUDENT IN AN ORGANIZED HEALTH CARE EDUCATION/TRAINING PROGRAM

## 2024-12-10 PROCEDURE — 99999 PR PBB SHADOW E&M-EST. PATIENT-LVL III: CPT | Mod: PBBFAC,,, | Performed by: STUDENT IN AN ORGANIZED HEALTH CARE EDUCATION/TRAINING PROGRAM

## 2024-12-10 PROCEDURE — 1160F RVW MEDS BY RX/DR IN RCRD: CPT | Mod: CPTII,S$GLB,, | Performed by: STUDENT IN AN ORGANIZED HEALTH CARE EDUCATION/TRAINING PROGRAM

## 2024-12-10 PROCEDURE — 99215 OFFICE O/P EST HI 40 MIN: CPT | Mod: S$GLB,,, | Performed by: STUDENT IN AN ORGANIZED HEALTH CARE EDUCATION/TRAINING PROGRAM

## 2024-12-10 PROCEDURE — 1159F MED LIST DOCD IN RCRD: CPT | Mod: CPTII,S$GLB,, | Performed by: STUDENT IN AN ORGANIZED HEALTH CARE EDUCATION/TRAINING PROGRAM

## 2024-12-10 PROCEDURE — 4010F ACE/ARB THERAPY RXD/TAKEN: CPT | Mod: CPTII,S$GLB,, | Performed by: STUDENT IN AN ORGANIZED HEALTH CARE EDUCATION/TRAINING PROGRAM

## 2024-12-10 NOTE — PROGRESS NOTES
Chronic Pain - Established Clinic Visit    Referring Physician: No ref. provider found    Chief Complaint:   Chief Complaint   Patient presents with    Back Pain    Leg Pain        SUBJECTIVE:  INTERVAL HISTORY 12/10/2024:  Mr. Solomon returns for left leg pain. Current Pain level is 6-8/10.  He reports that he had about 3 weeks of significant relief after his back surgery.  However, after his wife had an accident, he had to walk over 7000 steps.  He reports that afterwards, he developed recurrence of his pain. He had follow up imaging which was negative for any abnormalities. He has tried an epidural injection and he has tried physical therapy as well.    INTERVAL HISTORY 3/5/2024:  Mr. Solomon returns for bilateral leg pain. Current Pain level is 8/10.  He is s/p L4 and L5 intracept procedure. He feels his lower back pain has improved considerably, however his bilateral leg pain has worsened. Since his last visit, he has seen Dr. Yeh (orthopedic surgery) and was suggested to consider L4/5 PLIF.  Mr. Solomon's primary complaint at this time is his radiating bilateral lower extremity pain.    INTERVAL HISTORY 1/23/2023:  Mr. Solomon returns for chronic lower back pain and bilateral leg pain.  He is s/p bilateral L5/S1 transforaminal epidural steroid injection. He reports that the pain that was radiating down his legs has improved for his bilateral leg pain.  He reports the bilateral leg pain and tingling is 4-5/10.  Prior to the injection, the pain was 10/10. Now, he feels the pain is primarily in the back.  He feels the pain is worst when he is getting out of bed.  He still feels some numbness to the right leg.  He reports his pain gets worse when he moves around his legs.  He reports today his pain level is 0/10 as he took ibuprofen and tylenol prior to coming in to clinic.    INTERVAL HISTORY 12/13/2023:  Mr. Solomon presents for lower back and bilateral leg pain. Current Pain level is 5/10.  He is s/p L5/S1  interlaminar epidural steroid injection with no significant improvement in his pain. He reports that he initially had minimal relief of his left leg, however the pain returned after the weekend. He reports that his pain is 10/10 in the morning and feels better with flexion and taking Ibuprofen.  Coughing/sneezing makes his pain worse.  He continues to try home exercises as tolerated with limited relief in his pain.  He brought in his MRI report and disc from his outside facility which again demonstrates multilevel disc degenerative changes with multilevel foraminal stenosis.     Interval History 11/16/2023:  73-year-old male presents today for a follow-up appointment to discuss his lumbar MRI and be considered for pain interventions.  His pain continues to complain of  low back with radiating symptoms into his legs posteriorly slightly pass his knees bilaterally.  He reports pulling and tightening symptoms in his legs bilaterally walking, standing and repositioning his body increases his pain.   He denies any profound weakness denies any recent incident or trauma denies any bowel bladder dysfunction, denies any saddle anesthesia at this time.    David Solomon presents to the clinic for the evaluation of lower back pain. The pain started 6 weeks ago following lawn work and symptoms have been worsening.The pain is located in the lower back area and radiates to the hamstrings and down to his calves.  The pain is described as stabbing and is rated as 7/10. The pain is rated with a score of  0/10 on the BEST day and a score of 9/10 on the WORST day.  Symptoms interfere with daily activity. The pain is exacerbated by certain positions, walking, worse in the morning (getting out of bed).  The pain is mitigated by medications (ibuprofen and steroid (one dose).  The patient reports 6 hours of uninterrupted sleep per night.    He has tried biofreeze without relief.  He was given a medrol dose pack, but his blood glucose went  extremely high, so he stopped it. He also had a steroid injection at an ED which also severely increased his blood sugar.  He sees his endocrinologist for over 20 years and is currently on a long acting and short acting regimen.  He checks his sugars 10 times a day.    He was seen by Dr. Gonsalves on 5/25/2020 for knee pain and was given some knee rehab exercises.  He continues to participate in [employer].    Patient denies urinary incontinence, bowel incontinence, and significant motor weakness.    Physical Therapy/Home Exercise: no      Pain Disability Index Review:      12/10/2024     1:16 PM 3/5/2024     8:30 AM 1/23/2024     3:18 PM   Last 3 PDI Scores   Pain Disability Index (PDI) 63 70 56       Pain Medications:  Norco 7.5-325mg  Ibuprofen 800mg twice a day     report:  Reviewed and consistent with medication use as prescribed.      Pain Procedures:   12/1/2023 - L5/S1 LESI  2/19/2024 - B/L L5/S1 TFESI  3/15/2024 - B/L L4/5 TFESI  11/15/2024 - L5/S1 LESI (left directed)    Imaging:   MRI LUMBAR SPINE W WO CONTRAST     CLINICAL HISTORY:  Low back pain, prior surgery, new symptoms; Dorsalgia, unspecified     TECHNIQUE:  Multiplanar, multisequence MR images were acquired from the thoracolumbar junction to the sacrum without the administration of contrast.Additional post contrast images after 8 cc Gadovist     COMPARISON:  4/5/2024and 10/26/2023     FINDINGS:  Posterior instrumented fusion L4-5 with intervertebral disc spacer.  L4 laminectomy.     Alignment: Normal.     Vertebrae: 5 lumbar-type vertebral bodies. No aggressive marrow replacement process or fracture.     Discs: Multilevel disc dessication.     Cord: Normal. Conus terminates at T12-L1.     Degenerative findings:     T12-L1: There is no focal disc herniation. No significant central canal narrowing . No significant neural foraminal narrowing.     L1-L2: There is no focal disc herniation. No significant central canal narrowing . No significant  neural foraminal narrowing.     L2-L3: Broad based mild diffuse bulging of disc material .  Mild central canal narrowing . No significant neural foraminal narrowing.     L3-L4: Broad-based disc bulge, facet degenerative change and ligamentum flavum thickening which contribute to moderate central canal narrowing . Mild bilateral neural foraminal narrowing.     L4-L5: There is no focal disc herniation. No significant central canal narrowing . No significant neural foraminal narrowing.     L5-S1: There is no focal disc herniation. No significant central canal narrowing . No significant neural foraminal narrowing.     Paraspinal muscles & soft tissues: There is a subcutaneous postoperative collection measuring 1.9 x 9.0 x 6.0 cm extending from L3 through L5 levels.  This demonstrates continuity with laminectomy defect as noted on sagittal image 8 axial image 36/37 with fluid extending into the laminectomy bed.  There is no evidence for compression upon the thecal sac or communication with the thecal sac.     Postcontrast images demonstrate postoperative collection with minimal peripheral enhancement, most consistent with postoperative change inclusive of seroma/hematoma with abscess felt to be unlikely.  No abnormal enhancing intrathecal lesions otherwise noted.     Impression:     S/P posterior instrumented fusion L4-L5 with intervertebral disc spacer.  L4 laminectomy.  Postoperative collection as detailed above without evidence for mass effect upon the thecal sac or definite communication with the thecal sac.     Lumbar spondylosis L2-L3 and L3-L4 as above.        Electronically signed by:Robby Hubbard MD  Date:                                            11/03/2024  Time:                                           12:22    XR KNEE ORTHO LEFT WITH FLEXION     CLINICAL HISTORY:  . Pain in left knee     TECHNIQUE:  AP standing view of both knees, PA flexion standing views of both knees, and Merchant views of both knees  were performed. A lateral view of the left knee was also performed.     COMPARISON:  Radiographs dating back 2019 under 2 cm size of subcortical remote cystic change medial left tibial plateau epiphyseal plate, minimal mild medial posterior patellar articular facet DJD.     FINDINGS:  Under 2 cm size subcortical cystic remote change medial left tibial plateau epiphyseal plate stable.  Minimal mild posteromedial patella articular facet DJD stable.     Impression:     No new fracture dislocation or joint effusions.        Electronically signed by:Naeem Ortiz MD  Date:                                            11/05/2024  Time:                                           11:45      XR TIBIA FIBULA 2 VIEW LEFT     CLINICAL HISTORY:  Pain in left leg     TECHNIQUE:  AP and lateral views of the left tibia and fibula were performed.     COMPARISON:  Knee radiographs and report same day.     FINDINGS:  Focal subcortical remote cystic change medial tibial plateau epiphyseal plate.  Bone, joint and soft tissues otherwise normal.  Calcified plaque portions of popliteal and popliteal trifurcation, tibial arteries including dorsalis pedis artery.     Impression:     No fracture dislocation.  Additional findings above.        Electronically signed by:Naeem Ortiz MD  Date:                                            11/05/2024  Time:                                           11:48    XR LUMBAR SPINE AP AND LATERAL     CLINICAL HISTORY:  Other intervertebral disc degeneration, lumbar region without mention of lumbar back pain or lower extremity pain     TECHNIQUE:  AP, lateral and spot images were performed of the lumbar spine.     COMPARISON:  09/24/2024     FINDINGS:  Postoperative changes L4-5 posterior fusion with interbody spacer.  Hardware and alignment appears stable.  There is mild scoliosis with straightening of the normal lumbar lordosis.  There is intervertebral disc space narrowing and marginal osteophyte  formation at the non operative levels.  No acute fracture or osseous destructive process.  Sacroiliac joints are symmetric.  Postoperative changes of prior lower abdominal hernia repair.     Impression:     Stable postoperative changes lumbar spine.        Electronically signed by:Sanjeev Thorne MD  Date:                                            10/24/2024  Time:                                           11:59    Past Medical History:   Diagnosis Date    Cataract     Diabetes mellitus     Diabetic retinopathy     Hypertension      Past Surgical History:   Procedure Laterality Date    CATARACT EXTRACTION Right 03/27/2022    Dr. Ricketts    DESTRUCTION, INTRAOSSEOUS BASIVERTEBRAL NERVE, 1ST TWO BODIES, LUM/SAC N/A 2/19/2024    Procedure: DESTRUCTION,INTRAOSSEOUS BASIVERTEBRAL NERVE,1ST TWO BODIES,LUM/SAC;  Surgeon: Tomeka Lowry MD;  Location: [facility] OR;  Service: Pain Management;  Laterality: N/A;    EPIDURAL STEROID INJECTION INTO LUMBAR SPINE N/A 12/1/2023    Procedure: L5-S1 LESI;  Surgeon: Tomeka Lowry MD;  Location: [facility];  Service: Pain Management;  Laterality: N/A;  oral 20 mins    EPIDURAL STEROID INJECTION INTO LUMBAR SPINE Bilateral 1/2/2024    Procedure: B/L L5-S1 TFESI;  Surgeon: Tyree Ochoa DO;  Location: [facility];  Service: Pain Management;  Laterality: Bilateral;  20 mins    EPIDURAL STEROID INJECTION INTO LUMBAR SPINE Bilateral 3/15/2024    Procedure: B/L L4-5 TFESI;  Surgeon: Tomeka Lowry MD;  Location: [facility];  Service: Pain Management;  Laterality: Bilateral;  20 mins    EPIDURAL STEROID INJECTION INTO LUMBAR SPINE N/A 11/15/2024    Procedure: L5-S1 (AIM LEFT);  Surgeon: Tomeka Lowry MD;  Location: [facility];  Service: Pain Management;  Laterality: N/A;  20 mins  no AC    FUSION, SPINE, LUMBAR, TLIF, POSTERIOR APPROACH, USING PEDICLE SCREW N/A 8/27/2024    Procedure: FUSION, SPINE, LUMBAR, TLIF, POSTERIOR APPROACH, USING PEDICLE SCREW  L4-5 (L);  Surgeon: Suresh Yeh MD;  Location: [facility] OR 2ND FLR;  Service: Neurosurgery;  Laterality: N/A;    INTRAOCULAR PROSTHESES INSERTION Right 3/27/2022    Procedure: INSERTION, IOL PROSTHESIS;  Surgeon: Divine Ricketts MD;  Location: [facility] OR 1ST FLR;  Service: Ophthalmology;  Laterality: Right;    INTRAOCULAR PROSTHESES INSERTION Left 5/15/2022    Procedure: INSERTION, IOL PROSTHESIS;  Surgeon: Divine Ricketts MD;  Location: [facility] OR [address] [place] [address];  Service: Ophthalmology;  Laterality: Left;    PHACOEMULSIFICATION OF CATARACT Right 3/27/2022    Procedure: PHACOEMULSIFICATION, CATARACT;  Surgeon: Divine Ricketts MD;  Location: [facility] OR [address] [place] [address];  Service: Ophthalmology;  Laterality: Right;    PHACOEMULSIFICATION OF CATARACT Left 5/15/2022    Procedure: PHACOEMULSIFICATION, CATARACT;  Surgeon: Divine Ricketts MD;  Location: [facility] OR [address] [place] [address];  Service: Ophthalmology;  Laterality: Left;     Social History     Socioeconomic History    Marital status:    Tobacco Use    Smoking status: Never    Smokeless tobacco: Never   Substance and Sexual Activity    Alcohol use: No    Drug use: No    Sexual activity: Not Currently     Social Drivers of Health     Financial Resource Strain: Low Risk  (9/5/2024)    Overall Financial Resource Strain (CARDIA)     Difficulty of Paying Living Expenses: Not very hard   Food Insecurity: No Food Insecurity (9/5/2024)    Hunger Vital Sign     Worried About Running Out of Food in the Last Year: Never true     Ran Out of Food in the Last Year: Never true   Transportation Needs: No Transportation Needs (8/28/2024)    TRANSPORTATION NEEDS     Transportation : No   Physical Activity: Inactive (9/5/2024)    Exercise Vital Sign     Days of Exercise per Week: 0 days     Minutes of Exercise per Session: 0 min   Stress: No Stress Concern Present (9/5/2024)    [employer] [place] [employer] - Occupational Stress Questionnaire     Feeling of Stress : Not at all   Housing Stability:  Low Risk  (9/5/2024)    Housing Stability Vital Sign     Unable to Pay for Housing in the Last Year: No     Homeless in the Last Year: No     Family History   Problem Relation Name Age of Onset    No Known Problems Mother      No Known Problems Father      Melanoma Neg Hx         Review of patient's allergies indicates:  No Known Allergies    Current Outpatient Medications   Medication Sig    amitriptyline (ELAVIL) 10 MG tablet TAKE 1 TABLET BY MOUTH ONCE DAILY AT NIGHT AT BEDTIME    amLODIPine (NORVASC) 10 MG tablet Take 10 mg by mouth.    atorvastatin (LIPITOR) 40 MG tablet Take 40 mg by mouth every evening.    celecoxib (CELEBREX) 200 MG capsule Take 1 capsule (200 mg total) by mouth once daily.    diltiaZEM (CARDIZEM LA) 180 mg 24 hr tablet Take 180 mg by mouth once daily.    docusate sodium (COLACE) 100 MG capsule Take 1 capsule (100 mg total) by mouth 2 (two) times daily as needed for Constipation.    GLUCAGON EMERGENCY KIT, HUMAN, 1 mg injection Inject 1 mL into the muscle.    HUMALOG KWIKPEN INSULIN 100 unit/mL pen Inject into the skin.    hydroCHLOROthiazide (HYDRODIURIL) 12.5 MG Tab Take 12.5 mg by mouth once daily.    insulin glargine (LANTUS) 100 unit/mL injection Inject 25 Units into the skin once daily.    irbesartan (AVAPRO) 75 MG tablet Take 75 mg by mouth every evening.    ondansetron (ZOFRAN-ODT) 4 MG TbDL Dissolve 2 tablets (8 mg total) by mouth every 8 (eight) hours as needed.    oxyCODONE (ROXICODONE) 5 MG immediate release tablet Take 1 tablet (5 mg total) by mouth every 6 (six) hours as needed for Pain.    pregabalin (LYRICA) 50 MG capsule Take 1 capsule (50 mg total) by mouth 3 (three) times daily.    sulfamethoxazole-trimethoprim 800-160mg (BACTRIM DS) 800-160 mg Tab Take 1 tablet by mouth 2 (two) times daily.     No current facility-administered medications for this visit.     Facility-Administered Medications Ordered in Other Visits   Medication    phenylephrine HCL 2.5% ophthalmic  "solution 1 drop    phenylephrine HCL 2.5% ophthalmic solution 1 drop    proparacaine 0.5 % ophthalmic solution 1 drop    proparacaine 0.5 % ophthalmic solution 1 drop    tropicamide 1% ophthalmic solution 1 drop    tropicamide 1% ophthalmic solution 1 drop       REVIEW OF SYSTEMS:    GENERAL:  No weight loss, malaise or fevers.  HEENT:  Negative for frequent or significant headaches.  NECK:  Negative for lumps, goiter, pain and significant neck swelling.  RESPIRATORY:  Negative for cough, wheezing or shortness of breath.  CARDIOVASCULAR:  Negative for chest pain, leg swelling or palpitations.  GI:  Negative for abdominal discomfort, blood in stools or black stools or change in bowel habits.  MUSCULOSKELETAL:  See HPI.  SKIN:  Negative for lesions, rash, and itching.  PSYCH:  Negative for sleep disturbance, mood disorder and recent psychosocial stressors.  HEMATOLOGY/LYMPHOLOGY:  Negative for prolonged bleeding, bruising easily or swollen nodes. +DM type 1  NEURO:   No history of headaches, syncope, paralysis, seizures or tremors.  All other reviewed and negative other than HPI.    OBJECTIVE:    BP (!) 163/61 (BP Location: Left arm, Patient Position: Sitting)   Pulse 77   Ht 5' 7" (1.702 m)   Wt 72.6 kg (160 lb 0.9 oz)   BMI 25.07 kg/m²     PHYSICAL EXAMINATION:  General appearance: Well appearing, in no acute distress, alert and appropriately communicative.  Psych:  Mood and affect appropriate.  Skin: Skin color, texture, turgor normal, no rashes or lesions, in both upper and lower body.  Head/face:  Atraumatic, normocephalic.  Cor: regular rate  Pulm: non-labored breathing  GI: Abdomen non-distended and non-tender.  Back: Straight leg raising in the sitting and supine positions is positive to radicular pain left > right. No pain to palpation over the spine or paraspinal muscles.   Extremities: Peripheral joint ROM is full and pain free without obvious instability or laxity in all four extremities. +left " piriformis pain; No deformities, edema, or skin discoloration. Good capillary refill.  Musculoskeletal:  hip, sacroiliac and knee provocative maneuvers are negative. Bilateral upper and lower extremity strength is normal and symmetric.  No atrophy or tone abnormalities are noted.  Neuro: Bilateral upper and lower extremity coordination and muscle stretch reflexes are physiologic and symmetric.  Negative Clonus. No loss of sensation is noted.  Gait: Normal.    Lab Results   Component Value Date    CREATININE 1.2 08/28/2024     CMP  Sodium   Date Value Ref Range Status   08/28/2024 133 (L) 136 - 145 mmol/L Final     Potassium   Date Value Ref Range Status   08/28/2024 4.6 3.5 - 5.1 mmol/L Final     Chloride   Date Value Ref Range Status   08/28/2024 106 95 - 110 mmol/L Final     CO2   Date Value Ref Range Status   08/28/2024 20 (L) 23 - 29 mmol/L Final     Glucose   Date Value Ref Range Status   08/28/2024 192 (H) 70 - 110 mg/dL Final     BUN   Date Value Ref Range Status   08/28/2024 22 8 - 23 mg/dL Final     Creatinine   Date Value Ref Range Status   08/28/2024 1.2 0.5 - 1.4 mg/dL Final     Calcium   Date Value Ref Range Status   08/28/2024 8.7 8.7 - 10.5 mg/dL Final     Total Protein   Date Value Ref Range Status   08/28/2024 5.9 (L) 6.0 - 8.4 g/dL Final     Albumin   Date Value Ref Range Status   08/28/2024 3.2 (L) 3.5 - 5.2 g/dL Final     Total Bilirubin   Date Value Ref Range Status   08/28/2024 0.8 0.1 - 1.0 mg/dL Final     Comment:     For infants and newborns, interpretation of results should be based  on gestational age, weight and in agreement with clinical  observations.    Premature Infant recommended reference ranges:  Up to 24 hours.............<8.0 mg/dL  Up to 48 hours............<12.0 mg/dL  3-5 days..................<15.0 mg/dL  6-29 days.................<15.0 mg/dL       Alkaline Phosphatase   Date Value Ref Range Status   08/28/2024 73 55 - 135 U/L Final     AST   Date Value Ref Range Status    08/28/2024 27 10 - 40 U/L Final     ALT   Date Value Ref Range Status   08/28/2024 20 10 - 44 U/L Final     Anion Gap   Date Value Ref Range Status   08/28/2024 7 (L) 8 - 16 mmol/L Final     eGFR   Date Value Ref Range Status   08/28/2024 >60.0 >60 mL/min/1.73 m^2 Final         ASSESSMENT: 74 y.o. year old male with lower back pain, consistent with:     1. Myofascial pain syndrome  Procedure Order to Pain Management      2. Postlaminectomy syndrome of lumbar region        3. Chronic pain disorder          IMPRESSION: Mr. Solomon returns with lower back and bilateral lower extremity pain.  He is s/p L5/S1 interlaminar epidural injection with no improvement in his pain. History and physical exam are consistent with lumbar radiculopathy, specifically following the L5 distribution on the left. Imaging is consistent with lumbar degenerative disc disease with central and foraminal stenosis at L4/5 and L5/S1.  He has failed multiple interventions in the past, but his pain may be related to piriformis pain at this point.  We will offer another intervention, however if he fails to get relief, we may need to start discussions regarding spinal cord stimulation.    PLAN:   - I have stressed the importance of physical activity and a home exercise plan to help with pain and improve health.  - continue pregabalin 50mg BID  - schedule for left piriformis injection  - he has an appointment with Dr. Yeh tomorrow; if Dr. Yeh has an alternative option, we can consider cancelling the piriformis injection  - we discussed the option of spinal cord stimulation if his pain persists despite all other options  - continue home exercise program as he has been doing so far  - follow up after piriformis injection    The above plan and management options were discussed at length with patient. Patient is in agreement with the above and verbalized understanding. It will be communicated with the referring physician via electronic record, fax, or  mail.  > 40 minutes were spent discussing all options including prior therapies.    Tomeka Lowry MD  Interventional Pain Management    12/10/2024

## 2024-12-10 NOTE — H&P (VIEW-ONLY)
Chronic Pain - Established Clinic Visit    Referring Physician: No ref. provider found    Chief Complaint:   Chief Complaint   Patient presents with    Back Pain    Leg Pain        SUBJECTIVE:  INTERVAL HISTORY 12/10/2024:  Mr. Solomon returns for left leg pain. Current Pain level is 6-8/10.  He reports that he had about 3 weeks of significant relief after his back surgery.  However, after his wife had an accident, he had to walk over 7000 steps.  He reports that afterwards, he developed recurrence of his pain. He had follow up imaging which was negative for any abnormalities. He has tried an epidural injection and he has tried physical therapy as well.    INTERVAL HISTORY 3/5/2024:  Mr. Solomon returns for bilateral leg pain. Current Pain level is 8/10.  He is s/p L4 and L5 intracept procedure. He feels his lower back pain has improved considerably, however his bilateral leg pain has worsened. Since his last visit, he has seen Dr. Yeh (orthopedic surgery) and was suggested to consider L4/5 PLIF.  Mr. Solomon's primary complaint at this time is his radiating bilateral lower extremity pain.    INTERVAL HISTORY 1/23/2023:  Mr. Solomon returns for chronic lower back pain and bilateral leg pain.  He is s/p bilateral L5/S1 transforaminal epidural steroid injection. He reports that the pain that was radiating down his legs has improved for his bilateral leg pain.  He reports the bilateral leg pain and tingling is 4-5/10.  Prior to the injection, the pain was 10/10. Now, he feels the pain is primarily in the back.  He feels the pain is worst when he is getting out of bed.  He still feels some numbness to the right leg.  He reports his pain gets worse when he moves around his legs.  He reports today his pain level is 0/10 as he took ibuprofen and tylenol prior to coming in to clinic.    INTERVAL HISTORY 12/13/2023:  Mr. Solomon presents for lower back and bilateral leg pain. Current Pain level is 5/10.  He is s/p L5/S1  interlaminar epidural steroid injection with no significant improvement in his pain. He reports that he initially had minimal relief of his left leg, however the pain returned after the weekend. He reports that his pain is 10/10 in the morning and feels better with flexion and taking Ibuprofen.  Coughing/sneezing makes his pain worse.  He continues to try home exercises as tolerated with limited relief in his pain.  He brought in his MRI report and disc from his outside facility which again demonstrates multilevel disc degenerative changes with multilevel foraminal stenosis.     Interval History 11/16/2023:  73-year-old male presents today for a follow-up appointment to discuss his lumbar MRI and be considered for pain interventions.  His pain continues to complain of  low back with radiating symptoms into his legs posteriorly slightly pass his knees bilaterally.  He reports pulling and tightening symptoms in his legs bilaterally walking, standing and repositioning his body increases his pain.   He denies any profound weakness denies any recent incident or trauma denies any bowel bladder dysfunction, denies any saddle anesthesia at this time.    David Solomon presents to the clinic for the evaluation of lower back pain. The pain started 6 weeks ago following lawn work and symptoms have been worsening.The pain is located in the lower back area and radiates to the hamstrings and down to his calves.  The pain is described as stabbing and is rated as 7/10. The pain is rated with a score of  0/10 on the BEST day and a score of 9/10 on the WORST day.  Symptoms interfere with daily activity. The pain is exacerbated by certain positions, walking, worse in the morning (getting out of bed).  The pain is mitigated by medications (ibuprofen and steroid (one dose).  The patient reports 6 hours of uninterrupted sleep per night.    He has tried biofreeze without relief.  He was given a medrol dose pack, but his blood glucose went  extremely high, so he stopped it. He also had a steroid injection at an ED which also severely increased his blood sugar.  He sees his endocrinologist for over 20 years and is currently on a long acting and short acting regimen.  He checks his sugars 10 times a day.    He was seen by Dr. Gonsalves on 5/25/2020 for knee pain and was given some knee rehab exercises.  He continues to participate in [employer].    Patient denies urinary incontinence, bowel incontinence, and significant motor weakness.    Physical Therapy/Home Exercise: no      Pain Disability Index Review:      12/10/2024     1:16 PM 3/5/2024     8:30 AM 1/23/2024     3:18 PM   Last 3 PDI Scores   Pain Disability Index (PDI) 63 70 56       Pain Medications:  Norco 7.5-325mg  Ibuprofen 800mg twice a day     report:  Reviewed and consistent with medication use as prescribed.      Pain Procedures:   12/1/2023 - L5/S1 LESI  2/19/2024 - B/L L5/S1 TFESI  3/15/2024 - B/L L4/5 TFESI  11/15/2024 - L5/S1 LESI (left directed)    Imaging:   MRI LUMBAR SPINE W WO CONTRAST     CLINICAL HISTORY:  Low back pain, prior surgery, new symptoms; Dorsalgia, unspecified     TECHNIQUE:  Multiplanar, multisequence MR images were acquired from the thoracolumbar junction to the sacrum without the administration of contrast.Additional post contrast images after 8 cc Gadovist     COMPARISON:  4/5/2024and 10/26/2023     FINDINGS:  Posterior instrumented fusion L4-5 with intervertebral disc spacer.  L4 laminectomy.     Alignment: Normal.     Vertebrae: 5 lumbar-type vertebral bodies. No aggressive marrow replacement process or fracture.     Discs: Multilevel disc dessication.     Cord: Normal. Conus terminates at T12-L1.     Degenerative findings:     T12-L1: There is no focal disc herniation. No significant central canal narrowing . No significant neural foraminal narrowing.     L1-L2: There is no focal disc herniation. No significant central canal narrowing . No significant  neural foraminal narrowing.     L2-L3: Broad based mild diffuse bulging of disc material .  Mild central canal narrowing . No significant neural foraminal narrowing.     L3-L4: Broad-based disc bulge, facet degenerative change and ligamentum flavum thickening which contribute to moderate central canal narrowing . Mild bilateral neural foraminal narrowing.     L4-L5: There is no focal disc herniation. No significant central canal narrowing . No significant neural foraminal narrowing.     L5-S1: There is no focal disc herniation. No significant central canal narrowing . No significant neural foraminal narrowing.     Paraspinal muscles & soft tissues: There is a subcutaneous postoperative collection measuring 1.9 x 9.0 x 6.0 cm extending from L3 through L5 levels.  This demonstrates continuity with laminectomy defect as noted on sagittal image 8 axial image 36/37 with fluid extending into the laminectomy bed.  There is no evidence for compression upon the thecal sac or communication with the thecal sac.     Postcontrast images demonstrate postoperative collection with minimal peripheral enhancement, most consistent with postoperative change inclusive of seroma/hematoma with abscess felt to be unlikely.  No abnormal enhancing intrathecal lesions otherwise noted.     Impression:     S/P posterior instrumented fusion L4-L5 with intervertebral disc spacer.  L4 laminectomy.  Postoperative collection as detailed above without evidence for mass effect upon the thecal sac or definite communication with the thecal sac.     Lumbar spondylosis L2-L3 and L3-L4 as above.        Electronically signed by:Robby Hubbard MD  Date:                                            11/03/2024  Time:                                           12:22    XR KNEE ORTHO LEFT WITH FLEXION     CLINICAL HISTORY:  . Pain in left knee     TECHNIQUE:  AP standing view of both knees, PA flexion standing views of both knees, and Merchant views of both knees  were performed. A lateral view of the left knee was also performed.     COMPARISON:  Radiographs dating back 2019 under 2 cm size of subcortical remote cystic change medial left tibial plateau epiphyseal plate, minimal mild medial posterior patellar articular facet DJD.     FINDINGS:  Under 2 cm size subcortical cystic remote change medial left tibial plateau epiphyseal plate stable.  Minimal mild posteromedial patella articular facet DJD stable.     Impression:     No new fracture dislocation or joint effusions.        Electronically signed by:Naeem Ortiz MD  Date:                                            11/05/2024  Time:                                           11:45      XR TIBIA FIBULA 2 VIEW LEFT     CLINICAL HISTORY:  Pain in left leg     TECHNIQUE:  AP and lateral views of the left tibia and fibula were performed.     COMPARISON:  Knee radiographs and report same day.     FINDINGS:  Focal subcortical remote cystic change medial tibial plateau epiphyseal plate.  Bone, joint and soft tissues otherwise normal.  Calcified plaque portions of popliteal and popliteal trifurcation, tibial arteries including dorsalis pedis artery.     Impression:     No fracture dislocation.  Additional findings above.        Electronically signed by:Naeem Ortiz MD  Date:                                            11/05/2024  Time:                                           11:48    XR LUMBAR SPINE AP AND LATERAL     CLINICAL HISTORY:  Other intervertebral disc degeneration, lumbar region without mention of lumbar back pain or lower extremity pain     TECHNIQUE:  AP, lateral and spot images were performed of the lumbar spine.     COMPARISON:  09/24/2024     FINDINGS:  Postoperative changes L4-5 posterior fusion with interbody spacer.  Hardware and alignment appears stable.  There is mild scoliosis with straightening of the normal lumbar lordosis.  There is intervertebral disc space narrowing and marginal osteophyte  formation at the non operative levels.  No acute fracture or osseous destructive process.  Sacroiliac joints are symmetric.  Postoperative changes of prior lower abdominal hernia repair.     Impression:     Stable postoperative changes lumbar spine.        Electronically signed by:Sanjeev Thorne MD  Date:                                            10/24/2024  Time:                                           11:59    Past Medical History:   Diagnosis Date    Cataract     Diabetes mellitus     Diabetic retinopathy     Hypertension      Past Surgical History:   Procedure Laterality Date    CATARACT EXTRACTION Right 03/27/2022    Dr. Ricketts    DESTRUCTION, INTRAOSSEOUS BASIVERTEBRAL NERVE, 1ST TWO BODIES, LUM/SAC N/A 2/19/2024    Procedure: DESTRUCTION,INTRAOSSEOUS BASIVERTEBRAL NERVE,1ST TWO BODIES,LUM/SAC;  Surgeon: Tomeka Lowry MD;  Location: [facility] OR;  Service: Pain Management;  Laterality: N/A;    EPIDURAL STEROID INJECTION INTO LUMBAR SPINE N/A 12/1/2023    Procedure: L5-S1 LESI;  Surgeon: Tomeka Lowry MD;  Location: [facility] PAIN MANAGEMENT;  Service: Pain Management;  Laterality: N/A;  oral 20 mins    EPIDURAL STEROID INJECTION INTO LUMBAR SPINE Bilateral 1/2/2024    Procedure: B/L L5-S1 TFESI;  Surgeon: Tyree Ochoa DO;  Location: [facility] PAIN MANAGEMENT;  Service: Pain Management;  Laterality: Bilateral;  20 mins    EPIDURAL STEROID INJECTION INTO LUMBAR SPINE Bilateral 3/15/2024    Procedure: B/L L4-5 TFESI;  Surgeon: Tomeka Lowry MD;  Location: [facility] PAIN MANAGEMENT;  Service: Pain Management;  Laterality: Bilateral;  20 mins    EPIDURAL STEROID INJECTION INTO LUMBAR SPINE N/A 11/15/2024    Procedure: L5-S1 (AIM LEFT);  Surgeon: Tomeka Lowry MD;  Location: [facility] PAIN MANAGEMENT;  Service: Pain Management;  Laterality: N/A;  20 mins  no AC    FUSION, SPINE, LUMBAR, TLIF, POSTERIOR APPROACH, USING PEDICLE SCREW N/A 8/27/2024    Procedure: FUSION, SPINE, LUMBAR, TLIF, POSTERIOR APPROACH, USING PEDICLE SCREW  L4-5 (L);  Surgeon: Suresh Yeh MD;  Location: [facility] OR 2ND FLR;  Service: Neurosurgery;  Laterality: N/A;    INTRAOCULAR PROSTHESES INSERTION Right 3/27/2022    Procedure: INSERTION, IOL PROSTHESIS;  Surgeon: Divine Ricketts MD;  Location: [facility] OR 1ST FLR;  Service: Ophthalmology;  Laterality: Right;    INTRAOCULAR PROSTHESES INSERTION Left 5/15/2022    Procedure: INSERTION, IOL PROSTHESIS;  Surgeon: Divine Ricketts MD;  Location: [facility] OR [address] [place] [address];  Service: Ophthalmology;  Laterality: Left;    PHACOEMULSIFICATION OF CATARACT Right 3/27/2022    Procedure: PHACOEMULSIFICATION, CATARACT;  Surgeon: Divine Ricketts MD;  Location: [facility] OR [address] [place] [address];  Service: Ophthalmology;  Laterality: Right;    PHACOEMULSIFICATION OF CATARACT Left 5/15/2022    Procedure: PHACOEMULSIFICATION, CATARACT;  Surgeon: Divine Ricketts MD;  Location: [facility] OR [address] [place] [address];  Service: Ophthalmology;  Laterality: Left;     Social History     Socioeconomic History    Marital status:    Tobacco Use    Smoking status: Never    Smokeless tobacco: Never   Substance and Sexual Activity    Alcohol use: No    Drug use: No    Sexual activity: Not Currently     Social Drivers of Health     Financial Resource Strain: Low Risk  (9/5/2024)    Overall Financial Resource Strain (CARDIA)     Difficulty of Paying Living Expenses: Not very hard   Food Insecurity: No Food Insecurity (9/5/2024)    Hunger Vital Sign     Worried About Running Out of Food in the Last Year: Never true     Ran Out of Food in the Last Year: Never true   Transportation Needs: No Transportation Needs (8/28/2024)    TRANSPORTATION NEEDS     Transportation : No   Physical Activity: Inactive (9/5/2024)    Exercise Vital Sign     Days of Exercise per Week: 0 days     Minutes of Exercise per Session: 0 min   Stress: No Stress Concern Present (9/5/2024)    [employer] - Occupational Stress Questionnaire     Feeling of Stress : Not at all   Housing Stability:  Low Risk  (9/5/2024)    Housing Stability Vital Sign     Unable to Pay for Housing in the Last Year: No     Homeless in the Last Year: No     Family History   Problem Relation Name Age of Onset    No Known Problems Mother      No Known Problems Father      Melanoma Neg Hx         Review of patient's allergies indicates:  No Known Allergies    Current Outpatient Medications   Medication Sig    amitriptyline (ELAVIL) 10 MG tablet TAKE 1 TABLET BY MOUTH ONCE DAILY AT NIGHT AT BEDTIME    amLODIPine (NORVASC) 10 MG tablet Take 10 mg by mouth.    atorvastatin (LIPITOR) 40 MG tablet Take 40 mg by mouth every evening.    celecoxib (CELEBREX) 200 MG capsule Take 1 capsule (200 mg total) by mouth once daily.    diltiaZEM (CARDIZEM LA) 180 mg 24 hr tablet Take 180 mg by mouth once daily.    docusate sodium (COLACE) 100 MG capsule Take 1 capsule (100 mg total) by mouth 2 (two) times daily as needed for Constipation.    GLUCAGON EMERGENCY KIT, HUMAN, 1 mg injection Inject 1 mL into the muscle.    HUMALOG KWIKPEN INSULIN 100 unit/mL pen Inject into the skin.    hydroCHLOROthiazide (HYDRODIURIL) 12.5 MG Tab Take 12.5 mg by mouth once daily.    insulin glargine (LANTUS) 100 unit/mL injection Inject 25 Units into the skin once daily.    irbesartan (AVAPRO) 75 MG tablet Take 75 mg by mouth every evening.    ondansetron (ZOFRAN-ODT) 4 MG TbDL Dissolve 2 tablets (8 mg total) by mouth every 8 (eight) hours as needed.    oxyCODONE (ROXICODONE) 5 MG immediate release tablet Take 1 tablet (5 mg total) by mouth every 6 (six) hours as needed for Pain.    pregabalin (LYRICA) 50 MG capsule Take 1 capsule (50 mg total) by mouth 3 (three) times daily.    sulfamethoxazole-trimethoprim 800-160mg (BACTRIM DS) 800-160 mg Tab Take 1 tablet by mouth 2 (two) times daily.     No current facility-administered medications for this visit.     Facility-Administered Medications Ordered in Other Visits   Medication    phenylephrine HCL 2.5% ophthalmic  "solution 1 drop    phenylephrine HCL 2.5% ophthalmic solution 1 drop    proparacaine 0.5 % ophthalmic solution 1 drop    proparacaine 0.5 % ophthalmic solution 1 drop    tropicamide 1% ophthalmic solution 1 drop    tropicamide 1% ophthalmic solution 1 drop       REVIEW OF SYSTEMS:    GENERAL:  No weight loss, malaise or fevers.  HEENT:  Negative for frequent or significant headaches.  NECK:  Negative for lumps, goiter, pain and significant neck swelling.  RESPIRATORY:  Negative for cough, wheezing or shortness of breath.  CARDIOVASCULAR:  Negative for chest pain, leg swelling or palpitations.  GI:  Negative for abdominal discomfort, blood in stools or black stools or change in bowel habits.  MUSCULOSKELETAL:  See HPI.  SKIN:  Negative for lesions, rash, and itching.  PSYCH:  Negative for sleep disturbance, mood disorder and recent psychosocial stressors.  HEMATOLOGY/LYMPHOLOGY:  Negative for prolonged bleeding, bruising easily or swollen nodes. +DM type 1  NEURO:   No history of headaches, syncope, paralysis, seizures or tremors.  All other reviewed and negative other than HPI.    OBJECTIVE:    BP (!) 163/61 (BP Location: Left arm, Patient Position: Sitting)   Pulse 77   Ht 5' 7" (1.702 m)   Wt 72.6 kg (160 lb 0.9 oz)   BMI 25.07 kg/m²     PHYSICAL EXAMINATION:  General appearance: Well appearing, in no acute distress, alert and appropriately communicative.  Psych:  Mood and affect appropriate.  Skin: Skin color, texture, turgor normal, no rashes or lesions, in both upper and lower body.  Head/face:  Atraumatic, normocephalic.  Cor: regular rate  Pulm: non-labored breathing  GI: Abdomen non-distended and non-tender.  Back: Straight leg raising in the sitting and supine positions is positive to radicular pain left > right. No pain to palpation over the spine or paraspinal muscles.   Extremities: Peripheral joint ROM is full and pain free without obvious instability or laxity in all four extremities. +left " piriformis pain; No deformities, edema, or skin discoloration. Good capillary refill.  Musculoskeletal:  hip, sacroiliac and knee provocative maneuvers are negative. Bilateral upper and lower extremity strength is normal and symmetric.  No atrophy or tone abnormalities are noted.  Neuro: Bilateral upper and lower extremity coordination and muscle stretch reflexes are physiologic and symmetric.  Negative Clonus. No loss of sensation is noted.  Gait: Normal.    Lab Results   Component Value Date    CREATININE 1.2 08/28/2024     CMP  Sodium   Date Value Ref Range Status   08/28/2024 133 (L) 136 - 145 mmol/L Final     Potassium   Date Value Ref Range Status   08/28/2024 4.6 3.5 - 5.1 mmol/L Final     Chloride   Date Value Ref Range Status   08/28/2024 106 95 - 110 mmol/L Final     CO2   Date Value Ref Range Status   08/28/2024 20 (L) 23 - 29 mmol/L Final     Glucose   Date Value Ref Range Status   08/28/2024 192 (H) 70 - 110 mg/dL Final     BUN   Date Value Ref Range Status   08/28/2024 22 8 - 23 mg/dL Final     Creatinine   Date Value Ref Range Status   08/28/2024 1.2 0.5 - 1.4 mg/dL Final     Calcium   Date Value Ref Range Status   08/28/2024 8.7 8.7 - 10.5 mg/dL Final     Total Protein   Date Value Ref Range Status   08/28/2024 5.9 (L) 6.0 - 8.4 g/dL Final     Albumin   Date Value Ref Range Status   08/28/2024 3.2 (L) 3.5 - 5.2 g/dL Final     Total Bilirubin   Date Value Ref Range Status   08/28/2024 0.8 0.1 - 1.0 mg/dL Final     Comment:     For infants and newborns, interpretation of results should be based  on gestational age, weight and in agreement with clinical  observations.    Premature Infant recommended reference ranges:  Up to 24 hours.............<8.0 mg/dL  Up to 48 hours............<12.0 mg/dL  3-5 days..................<15.0 mg/dL  6-29 days.................<15.0 mg/dL       Alkaline Phosphatase   Date Value Ref Range Status   08/28/2024 73 55 - 135 U/L Final     AST   Date Value Ref Range Status    08/28/2024 27 10 - 40 U/L Final     ALT   Date Value Ref Range Status   08/28/2024 20 10 - 44 U/L Final     Anion Gap   Date Value Ref Range Status   08/28/2024 7 (L) 8 - 16 mmol/L Final     eGFR   Date Value Ref Range Status   08/28/2024 >60.0 >60 mL/min/1.73 m^2 Final         ASSESSMENT: 74 y.o. year old male with lower back pain, consistent with:     1. Myofascial pain syndrome  Procedure Order to Pain Management      2. Postlaminectomy syndrome of lumbar region        3. Chronic pain disorder          IMPRESSION: Mr. Solomon returns with lower back and bilateral lower extremity pain.  He is s/p L5/S1 interlaminar epidural injection with no improvement in his pain. History and physical exam are consistent with lumbar radiculopathy, specifically following the L5 distribution on the left. Imaging is consistent with lumbar degenerative disc disease with central and foraminal stenosis at L4/5 and L5/S1.  He has failed multiple interventions in the past, but his pain may be related to piriformis pain at this point.  We will offer another intervention, however if he fails to get relief, we may need to start discussions regarding spinal cord stimulation.    PLAN:   - I have stressed the importance of physical activity and a home exercise plan to help with pain and improve health.  - continue pregabalin 50mg BID  - schedule for left piriformis injection  - he has an appointment with Dr. Yeh tomorrow; if Dr. Yeh has an alternative option, we can consider cancelling the piriformis injection  - we discussed the option of spinal cord stimulation if his pain persists despite all other options  - continue home exercise program as he has been doing so far  - follow up after piriformis injection    The above plan and management options were discussed at length with patient. Patient is in agreement with the above and verbalized understanding. It will be communicated with the referring physician via electronic record, fax, or  mail.  > 40 minutes were spent discussing all options including prior therapies.    Tomeka Lowry MD  Interventional Pain Management    12/10/2024

## 2024-12-10 NOTE — TELEPHONE ENCOUNTER
----- Message from Kanika Mirza sent at 12/10/2024  1:49 PM CST -----  Regarding: Order for SCOTTIE WILSON    Patient Name: SCOTTIE WILSON([id number])  Sex: Male  : 1950      PCP: DOMINIC, PRIMARY DOCTOR    Center: [facility] ([facility]     Types of orders made on 12/10/2024: Procedure Request    Order Date:12/10/2024  Ordering User:KANIKA MIRZA [[id number]  Encounter Provider:Kanika Mirza MD [[id number]]  Authorizing Provider: Kanika Mirza MD [[id number]]  Department:[facility][[id number]]    Common Order Information  Procedure -> Other (Specify location and laterality) Cmt: left piriformis             injection    Order Specific Information  Order: Procedure Order to Pain Management [Custom: [id number]]  Order #:          [id number]: 1 FUTURE    Priority: Routine  Class: Clinic Performed    Future Order Information      Expires on:12/10/2025            Expected by:12/10/2024                   Associated Diagnoses      M79.18 Myofascial pain syndrome      Physician -> YUKI         Is patient on anti-coagulants? -> No         Facility Name: -> [facility]         Follow-up: -> 2 weeks           Priority: Routine  Class: Clinic Performed    Future Order Information      Expires on:12/10/2025            Expected by:12/10/2024                   Associated Diagnoses      M79.18 Myofascial pain syndrome      Procedure -> Other (Specify location and laterality) Cmt: left piriformis                 injection        Physician -> YUKI         Is patient on anti-coagulants? -> No         Facility Name: -> [facility]         Follow-up: -> 2 weeks

## 2024-12-11 ENCOUNTER — PATIENT MESSAGE (OUTPATIENT)
Dept: ORTHOPEDICS | Facility: CLINIC | Age: 74
End: 2024-12-11

## 2024-12-11 ENCOUNTER — OFFICE VISIT (OUTPATIENT)
Dept: ORTHOPEDICS | Facility: CLINIC | Age: 74
End: 2024-12-11
Payer: MEDICARE

## 2024-12-11 VITALS — WEIGHT: 166.44 LBS | HEIGHT: 67 IN | BODY MASS INDEX: 26.12 KG/M2

## 2024-12-11 DIAGNOSIS — M51.362 DEGENERATION OF INTERVERTEBRAL DISC OF LUMBAR REGION WITH DISCOGENIC BACK PAIN AND LOWER EXTREMITY PAIN: ICD-10-CM

## 2024-12-11 DIAGNOSIS — M48.07 SPINAL STENOSIS, LUMBOSACRAL REGION: ICD-10-CM

## 2024-12-11 DIAGNOSIS — M54.16 LUMBAR RADICULOPATHY: Primary | ICD-10-CM

## 2024-12-11 DIAGNOSIS — Z98.1 S/P LUMBAR FUSION: ICD-10-CM

## 2024-12-11 DIAGNOSIS — M47.816 LUMBAR SPONDYLOSIS: ICD-10-CM

## 2024-12-11 PROCEDURE — 4010F ACE/ARB THERAPY RXD/TAKEN: CPT | Mod: CPTII,S$GLB,, | Performed by: ORTHOPAEDIC SURGERY

## 2024-12-11 PROCEDURE — 1101F PT FALLS ASSESS-DOCD LE1/YR: CPT | Mod: CPTII,S$GLB,, | Performed by: ORTHOPAEDIC SURGERY

## 2024-12-11 PROCEDURE — 3008F BODY MASS INDEX DOCD: CPT | Mod: CPTII,S$GLB,, | Performed by: ORTHOPAEDIC SURGERY

## 2024-12-11 PROCEDURE — 99214 OFFICE O/P EST MOD 30 MIN: CPT | Mod: S$GLB,,, | Performed by: ORTHOPAEDIC SURGERY

## 2024-12-11 PROCEDURE — 1160F RVW MEDS BY RX/DR IN RCRD: CPT | Mod: CPTII,S$GLB,, | Performed by: ORTHOPAEDIC SURGERY

## 2024-12-11 PROCEDURE — 3044F HG A1C LEVEL LT 7.0%: CPT | Mod: CPTII,S$GLB,, | Performed by: ORTHOPAEDIC SURGERY

## 2024-12-11 PROCEDURE — 1125F AMNT PAIN NOTED PAIN PRSNT: CPT | Mod: CPTII,S$GLB,, | Performed by: ORTHOPAEDIC SURGERY

## 2024-12-11 PROCEDURE — 99999 PR PBB SHADOW E&M-EST. PATIENT-LVL III: CPT | Mod: PBBFAC,,, | Performed by: ORTHOPAEDIC SURGERY

## 2024-12-11 PROCEDURE — 3288F FALL RISK ASSESSMENT DOCD: CPT | Mod: CPTII,S$GLB,, | Performed by: ORTHOPAEDIC SURGERY

## 2024-12-11 PROCEDURE — 1159F MED LIST DOCD IN RCRD: CPT | Mod: CPTII,S$GLB,, | Performed by: ORTHOPAEDIC SURGERY

## 2024-12-11 NOTE — PROGRESS NOTES
"DATE: 12/11/2024  PATIENT: David Solomon    Attending Physician: Suresh Yeh M.D.    8/27/24: L4-5 PLDF/TLIF    HISTORY:  David Solomon is a 74 y.o. male who returns to me today for follow up.  She says that he was initially doing great but developed pain going down the left lateral leg about 2 weeks after surgery. He had an MRI for this and then underwent L5-S1 interlaminar JUAN which help some for a few days. He did have some wound healing problems post op and had to use a prevena.      PMH/PSH/FamHx/SocHx:  Unchanged from prior visit    ROS:  Positive for left lateral leg pain   Denies perineal paresthesias, bowel or bladder incontinence    EXAM:  Ht 5' 7" (1.702 m)   Wt 75.5 kg (166 lb 7.2 oz)   BMI 26.07 kg/m²     My physical examination was notable for the following findings: 5/5 strength through the bilateral lower extremities, SILT throughout. 2+ DP pulses.     IMAGING:  Today I independently reviewed the following images and my interpretations are as follows:    Lumbar Xrs showed L4-5 TLIF without hardware complications.    MRI lumbar showed L3-4 mod adjacent level stenosis. L L5-S1 mod foraminal stenosis.    ASSESSMENT/PLAN:  Patient has lumbar spondylosis and stenosis with LLE radiculopathy, in the setting of recent L4-5 TLIF. I educated the patient on the importance of core/back strengthening, correct posture, bending/lifting ergonomics, and low-impact aerobic exercises (walking, elliptical, and aquatherapy). Continue medications. I ordered CT lumbar to assess fusion/hardware. I ordered EMG BLE to further elucidate his radiculopathy. RTC in 6 weeks for results review.    Suresh Yeh MD  Orthopaedic Spine Surgeon  Department of Orthopaedic Surgery  [phone]      "

## 2024-12-12 DIAGNOSIS — Z98.1 S/P LUMBAR FUSION: ICD-10-CM

## 2024-12-12 RX ORDER — OXYCODONE HYDROCHLORIDE 10 MG/1
10 TABLET ORAL EVERY 6 HOURS PRN
Qty: 28 TABLET | Refills: 0 | Status: SHIPPED | OUTPATIENT
Start: 2024-12-12

## 2024-12-13 ENCOUNTER — TELEPHONE (OUTPATIENT)
Dept: PAIN MEDICINE | Facility: CLINIC | Age: 74
End: 2024-12-13
Payer: MEDICARE

## 2024-12-13 ENCOUNTER — PATIENT MESSAGE (OUTPATIENT)
Dept: ORTHOPEDICS | Facility: CLINIC | Age: 74
End: 2024-12-13
Payer: MEDICARE

## 2024-12-16 ENCOUNTER — PROCEDURE VISIT (OUTPATIENT)
Dept: NEUROLOGY | Facility: CLINIC | Age: 74
End: 2024-12-16
Payer: MEDICARE

## 2024-12-16 DIAGNOSIS — M54.16 LUMBAR RADICULOPATHY: ICD-10-CM

## 2024-12-16 PROCEDURE — 95912 NRV CNDJ TEST 11-12 STUDIES: CPT | Mod: S$GLB,,, | Performed by: PHYSICAL MEDICINE & REHABILITATION

## 2024-12-16 PROCEDURE — 95886 MUSC TEST DONE W/N TEST COMP: CPT | Mod: S$GLB,,, | Performed by: PHYSICAL MEDICINE & REHABILITATION

## 2024-12-16 PROCEDURE — 95885 MUSC TST DONE W/NERV TST LIM: CPT | Mod: 59,S$GLB,, | Performed by: PHYSICAL MEDICINE & REHABILITATION

## 2024-12-16 NOTE — PROCEDURES
Test Date:  2024    Patient: juan ramon nelson  : 1950 Physician: Faheem Ruiz D.O.   ID#: [id number] SEX: Male Ref. Phys: Suresh Yeh MD     HPI: Juan Ramon Nelson is a 74 y.o.male who presents for NCS/EMG to evaluate for lumbosacral radiculopathy of LLE.  Note: RUE evaluated in limited fashion on NCS given sensory findings in the bilateral lower extremities.      PROCEDURE:  Prior to the procedure, the procedure was discussed in detail with the patient.  All questions were answered, and verbal consent was obtained.  For nerve conduction studies, a combination of surface electrodes, bar electrodes, and/or ring electrodes were used as needed.  For needle EMG, each site was cleaned and prepped in usual fashion with an alcohol pad.  A monopolar needle (28G) was used.  There was no significant bleeding, and bandages were applied as needed.  The procedure was tolerated without adverse effect.  The patient was instructed on post-procedure care including ice if needed for 10-15 minutes up to 4 times/day for any sore muscles.  I discussed with the patient that the data would be reviewed and a report sent to the referring provider, where any follow up questions regarding next steps should be directed.        NCV & EMG Findings:  Evaluation of the right median sensory nerve showed prolonged distal onset latency, prolonged distal peak latency, and decreased conduction velocity.  The right radial sensory and the right sural sensory nerves showed reduced amplitude.  The left Superficial Fibular sensory, the right Superficial Fibular sensory, and the left sural sensory nerves showed no response.  All remaining nerves (as indicated in the following tables) were within normal limits.  Needle evaluation of the left Tibialis Anterior and the left Tibialis Posterior muscles showed moderately increased polyphasic potentials.  All remaining muscles (as indicated in the following table) showed no evidence of electrical  instability.      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  There is evidence to suggest a chronic left L5 radiculopathy with no active/ongoing denervation.  Note: paraspinals were not sampled as he had prior lumbar surgery, and these can often show false positives after lumbar surgery.     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 Carpal tunnel syndrome).  There is no motor axonal loss.  This is graded as Mild in severity on the right.          ___________________________  Faheem Ruiz D.O.        NCS+  Motor Nerve Results      Latency Amplitude F-Lat Segment Distance CV Comment   Site (ms) Norm (mV) Norm (ms)  (cm) (m/s) Norm    Right Median (APB)   Wrist 4.0  < 4.7 6.2  > 3.8         Elbow 8.9 - 5.4 -  Elbow-Wrist 26 53  > 47    Left Fibular (EDB)   Ankle 3.9  < 6.5 1.26  > 1.10         Bel Fib Head 12.2 - 0.90 -  Bel Fib Head-Ankle 39 47 -    Pop Fossa 14.2 - 0.89 -  Pop Fossa-Bel Fib Head 12 60  > 42    Right Fibular (EDB)   Ankle 3.2  < 6.5 2.1  > 1.10         Bel Fib Head 12.5 - 1.64 -  Bel Fib Head-Ankle 40 43 -    Pop Fossa 14.6 - 1.60 -  Pop Fossa-Bel Fib Head 12 57  > 42    Left Tibial (AHB)   Ankle 4.1  < 6.1 6.5  > 1.10         Right Tibial (AHB)   Ankle 3.5  < 6.1 3.6  > 1.10           Sensory Nerve Results      Start Lat Latency (Peak) Amplitude (P-P) Segment Distance Start CV Comment   Site (ms) Norm (ms) (µV) Norm  (cm) (m/s) Norm    Right Median (Rec:Dig II)   Wrist *3.4  < 3.3 *4.5 12  > 8 Wrist-Dig II 14 *41  > 42    Right Ulnar (Rec:Dig V)   Wrist 2.2  < 3.1 3.1 9  > 4 Wrist-Dig V 14 64  > 45    Right Radial (Rec:Wrist)   Forearm 1.35  < 2.2 1.88 *9  > 11 Forearm-Wrist 10 74 -    Left Sural (Rec:Lat Mall)   Calf *NR  < 3.6 *NR *NR  > 4 Calf-Lat Mall 14 *NR  > 39    Right Sural (Rec:Lat Mall)   Calf 3.1  < 3.6 3.8 *3  > 4 Calf-Lat Mall 14 45  > 39    Left  Superficial Fibular (Rec:Ankle)   14 cm *NR - *NR *NR  > 5 14 cm-Ankle 14 *NR  > 32    Right Superficial Fibular (Rec:Ankle)   14 cm *NR - *NR *NR  > 5 14 cm-Ankle 14 *NR  > 32      EMG+     Side Muscle Nerve Root Ins Act Fibs Psw Amp Dur Poly Recrt Int Pat Comment   Left Vastus Med Femoral L2-L4 Nml Nml Nml Nml Nml 0 Nml Nml    Left Tib Anterior Fibular,  Deep Fibula... L4-L5 Nml Nml Nml Nml Nml *2+ Nml Nml    Left Fib Longus Fibular,  Superficial... L5-S1 Nml Nml Nml Nml Nml 0 Nml Nml    Left Tib Posterior Tibial L5-S1 Nml Nml Nml Nml Nml *2+ Nml Nml    Left Gastroc Tibial S1-S2 Nml Nml Nml Nml Nml 0 Nml Nml    Left Dorsal Interossei ped I Lateral Plantar S2-S3 Nml Nml Nml Nml Nml 0 Nml Nml    Right Dorsal Interossei ped I Lateral Plantar S2-S3 Nml Nml Nml Nml Nml 0 Nml Nml            Waveforms:    Motor                Sensory

## 2024-12-18 NOTE — PRE-PROCEDURE INSTRUCTIONS
Patient reviewed on 12/18/2024.  Okay to proceed at [facility]. The following pre-procedure instructions and arrival time have been reviewed with patient via phone and sent to patient portal for review.  Patient verbalized an understanding.        Dear David,     Please read over the following pre-procedure instructions in it's entirety as there is helpful information here to get you well prepared for your upcoming procedure.     You are scheduled for a procedure with Dr. Lowry on 12/20/2024.     [facility] at the corner of [facility] and [facility]. It is in the [facility] next to [employer]. The address is: [address] [place] [address]. Take the elevator to the 2nd floor.       Registration check in time: 1:40 pm  Procedure scheduled for time: 2:40 pm     If you are receiving sedation, you CANNOT drive yourself and must have a responsible friend or family member (no rideshare) to drive you home.        You should take any medications that you routinely take for blood pressure, heart medications, thyroid, cholesterol, etc.      The fasting restrictions are dependent on whether or not you are receiving sedation. Sedation is not available for all procedures.      Your fasting instructions/Sedation type are as follow:  No sedation.  You do not need to fast before this procedure.  You can eat and drink like normal.  You can drive yourself (with stipulations).  There are some rare cases where you may need to call an uber if you are unable to drive after the procedure due to weakness/dizziness.         If you are on blood thinners, you need to follow the anticoagulation instructions that had been discussed previously. You should only stop the blood thinners if it was approved by your primary care physician or your cardiologist. In the event that you are not able to stop your blood thinners, a blood thinner was not listed on your medication list, or we were not able to get clearance from your  cardiologist, then the procedure may have to be postponed/canceled.      IF you were told to stop your blood thinners, this is how long you should generally hold some of the more common ones. Remember that stopping blood thinners is only necessary for certain procedures. If you are unsure of your instructions, please call us.   Aspirin - 5 days  Plavix/Clopidogrel - 7 days  Warfarin / Coumadin - 5 days  Eliquis - 3 days  Pradaxa/Dabigatran - 4 days  Xarelto/Rivaroxaban - 3 days     If you are a diabetic, do not take your medication if you will be fasting, but bring it with you. Please plan on being here for roughly 2-3 hours.      HOLD all non-insulin injections (shots) until after surgery (Ozempic, Mounjaro, Trulicity, Victoza, Byetta, Wegovy and Adlyxin) (Total of 7 days prior)       Please call us if you have been sick (running fever, having any flu-like symptoms) or have been taking ANTIBIOTICS in the past 2 weeks or had any outpatient procedures other than with us (colonoscopy, endoscopy, OBGYN, dental, etc.).      If you have been previously COVID positive, you will need to hold off on your procedure until you are symptom free for 10 days. If you did not have any symptoms, you can have your procedure 10 days from your positive test result.         On the morning of your procedure:  *HOLD ALL VITAMINS, MINERALS, HERBS (INCLUDING HERBAL TEAS) AND SUPPLEMENTS  *SHOWER WITH ANTIBACTERIAL SOAP (EX. DIAL) NIGHT BEFORE AND MORNING OF PROCEDURE  *DO NOT APPLY ANY LOTIONS, OILS, POWDERS, PERFUME/COLOGNE, OINTMENTS, GELS, CREAMS, MAKEUP OR DEODORANT TO YOUR SKIN MORNING OF PROCEDURE  *LEAVE JEWELRY AND ANY VALUABLES AT HOME  *WEAR LOOSE COMFORTABLE CLOTHING         Please reply to this portal message as receipt of delivery.     Thank you,  [facility]  [place], LPN [facility]  Pre-Admit

## 2024-12-19 NOTE — PRE-PROCEDURE INSTRUCTIONS
Spoke to patient about new procedure time of 1:140p. Patient states he can arrive for 12:30-12:40p

## 2024-12-20 ENCOUNTER — HOSPITAL ENCOUNTER (OUTPATIENT)
Facility: HOSPITAL | Age: 74
Discharge: HOME | End: 2024-12-20
Attending: STUDENT IN AN ORGANIZED HEALTH CARE EDUCATION/TRAINING PROGRAM | Admitting: STUDENT IN AN ORGANIZED HEALTH CARE EDUCATION/TRAINING PROGRAM
Payer: MEDICARE

## 2024-12-20 VITALS
DIASTOLIC BLOOD PRESSURE: 81 MMHG | RESPIRATION RATE: 15 BRPM | HEIGHT: 67 IN | WEIGHT: 161 LBS | SYSTOLIC BLOOD PRESSURE: 173 MMHG | TEMPERATURE: 98 F | HEART RATE: 76 BPM | BODY MASS INDEX: 25.27 KG/M2 | OXYGEN SATURATION: 99 %

## 2024-12-20 DIAGNOSIS — M79.18 MYOFASCIAL PAIN SYNDROME: Primary | ICD-10-CM

## 2024-12-20 DIAGNOSIS — G89.29 CHRONIC PAIN: ICD-10-CM

## 2024-12-20 LAB
POCT GLUCOSE: 117 MG/DL (ref 70–110)
POCT GLUCOSE: 73 MG/DL (ref 70–110)

## 2024-12-20 PROCEDURE — 20552 NJX 1/MLT TRIGGER POINT 1/2: CPT | Mod: LT | Performed by: STUDENT IN AN ORGANIZED HEALTH CARE EDUCATION/TRAINING PROGRAM

## 2024-12-20 PROCEDURE — 63600175 PHARM REV CODE 636 W HCPCS: Performed by: STUDENT IN AN ORGANIZED HEALTH CARE EDUCATION/TRAINING PROGRAM

## 2024-12-20 PROCEDURE — 82962 GLUCOSE BLOOD TEST: CPT | Performed by: STUDENT IN AN ORGANIZED HEALTH CARE EDUCATION/TRAINING PROGRAM

## 2024-12-20 PROCEDURE — 25500020 PHARM REV CODE 255: Performed by: STUDENT IN AN ORGANIZED HEALTH CARE EDUCATION/TRAINING PROGRAM

## 2024-12-20 PROCEDURE — 20552 NJX 1/MLT TRIGGER POINT 1/2: CPT | Mod: LT,,, | Performed by: STUDENT IN AN ORGANIZED HEALTH CARE EDUCATION/TRAINING PROGRAM

## 2024-12-20 RX ORDER — TRIAMCINOLONE ACETONIDE 40 MG/ML
INJECTION, SUSPENSION INTRA-ARTICULAR; INTRAMUSCULAR
Status: DISCONTINUED | OUTPATIENT
Start: 2024-12-20 | End: 2024-12-20 | Stop reason: HOSPADM

## 2024-12-20 RX ORDER — ALPRAZOLAM 0.5 MG/1
0.5 TABLET, ORALLY DISINTEGRATING ORAL ONCE AS NEEDED
Status: DISCONTINUED | OUTPATIENT
Start: 2024-12-20 | End: 2024-12-20 | Stop reason: HOSPADM

## 2024-12-20 RX ORDER — LIDOCAINE HYDROCHLORIDE 20 MG/ML
INJECTION, SOLUTION EPIDURAL; INFILTRATION; INTRACAUDAL; PERINEURAL
Status: DISCONTINUED | OUTPATIENT
Start: 2024-12-20 | End: 2024-12-20 | Stop reason: HOSPADM

## 2024-12-20 RX ORDER — BUPIVACAINE HYDROCHLORIDE 2.5 MG/ML
INJECTION, SOLUTION EPIDURAL; INFILTRATION; INTRACAUDAL
Status: DISCONTINUED | OUTPATIENT
Start: 2024-12-20 | End: 2024-12-20 | Stop reason: HOSPADM

## 2024-12-20 NOTE — DISCHARGE INSTRUCTIONS
[facility]  Dr. Tomeka Lowry  Messaging service # [phone]    POST-PROCEDURE INSTRUCTIONS:  HELPFUL TIPS:    Except for block procedures (medial branch blocks, genicular blocks, hip/shoulder blocks), most procedures take about 2 weeks to start seeing the full effect toward your pain.  If you feel like the pain relief goes away after a day, please wait up to 2 weeks to decide if the procedure provided you any relief    If you had a block procedure (medial branch blocks, genicular blocks, hip/shoulder blocks), you MUST submit your pain diary and percentage relief scores to proceed with the next block and/or procedure.  Please submit the diary/percentages through the [facility] OR you can call [phone] ([facility]) OR [phone] ([facility]) during clinic hours (Monday - Friday, 8AM - 5PM)    Follow up in 2 weeks with either the provider that suggested this procedure OR with Dr. Lowry (including his team members at [facility]).  You may already have a follow up appointment scheduled.  If not, you may make an appointment through the [facility] or you can contact the office that suggested your procedure.  If you would like to make an appointment with Dr. Lowry, you may do so through the [facility] Portal OR you can call [phone] ([facility]) OR [phone] ([facility]) during clinic hours (Monday - Friday, 8AM - 5PM).      What you need to do:    Keep a record of your response to the injection you had today.    How much relief did you get?   When did the relief start and how long did it last?  Were you able to decrease the use of any of your pain medications?  Were you able to increase your level of activity?  How long did the relief last?    What to watch out for:    If you experience any of the following symptoms after your procedure, please notify the messaging service immediately (see above for contact information):   fever (increased oral temperature)   bleeding or  swelling at the injection site,    drainage, rash or redness at the injection site    possible signs of infection    increased pain at the injection site   worsening of your usual pain   severe headache   new or worsening numbness    new arm and/or leg weakness, or    changes in bowel and/or bladder function: urinating or defecating on yourself and not knowing that you did it.    PLEASE FOLLOW ALL INSTRUCTIONS CAREFULLY     Do not engage in strenuous activity (e.g., lifting or pushing heavy objects or repeated bending) for 24 hours.     Do not take a bath, swim or use Jacuzzi for 24 hours after procedure. (A shower is fine).   Remove any Band-Aids when you get home.    Use cold/ice, as needed for comfort.  We recommend the use of cold therapy alternating on for 20 minutes, off for 20 minutes.    Do not apply direct heat (heating pad or heat packs) to the injection site for 24 hours.     Resume your usual medications, unless instructed otherwise by your Pain Physician.     If you are on warfarin (Coumadin) or other blood thinner, resume this medication as instructed by your prescribing Physician.    IF AT ANY POINT YOU ARE VERY CONCERNED ABOUT YOUR SYMPTOMS, PLEASE GO TO THE EMERGENCY ROOM.    If you develop worsening pain, weakness, numbness, lose bowel or bladder control (i.e., having an accident where you did not even know you had to go to the bathroom and suddenly noticed you soiled yourself), saddle anesthesia (a loss of sensation restricted to the area of the buttocks, anus and between the legs -- i.e., those parts of your body that would touch a saddle if you were sitting on one) you need to go immediately to the emergency department for evaluation and treatment.    ----------------------------------------------------------------------------------------------------------------------------------------------------------------  If you received Sedation please read the following instructions:  POST SEDATION  INSTRUCTIONS    Today you received intravenous medication (also known as sedation) that was used to help you relax and/or decrease discomfort during your procedure. This medication will be acting in your body for the next 24 hours, so you might feel a little tired or sleepy. This feeling will slowly wear off.   Common side effects associated with these medications include: drowsiness, dizziness, sleepiness, confusion, feeling excited, difficulty remembering things, lack of steadiness with walking or balance, loss of fine muscle control, slowed reflexes, difficulty focusing, and blurred vision.  Some over-the-counter and prescription medications (e.g., muscle relaxants, opioids, mood-altering medications, sedatives/hypnotics, antihistamines) can interact with the intravenous medication you received and cause an increased risk of the side effects listed above in addition to other potentially life threatening side effects. Use extreme caution if you are taking such medications, and consult with your Pain Physician or prescribing physician if you have any questions.  For the next 12-24 hours:    DO NOT--Drive a car, operate machinery or power tools   DO NOT--Drink any alcoholic beverages (not even beer), they may dangerously increase the risk of side effects.    DO NOT--Make any important legal or business decisions or sign important documents.  We advise you to have someone to assist you at home. Move slowly and carefully. Do not make sudden changes in position. Be aware of dizziness or light-headedness and move accordingly.   If you seek medical treatment within 24 hours, let the nurse or doctor caring for you know that you have received the above medications. If you have any questions or concerns related to your sedation or treatment today please contact us.

## 2024-12-20 NOTE — PLAN OF CARE
Patient vital signs stable and pt has no complaints at this time. Discharge instructions reviewed with pt and understood. No questions or concerns from pt at this time. Pt belongings returned. D/c to lobby.

## 2024-12-20 NOTE — DISCHARGE SUMMARY
Discharge Note  Short Stay      SUMMARY     Admit Date: 12/20/2024    Attending Physician: Tomeka Lowry MD PhD    Discharge Physician: Tomeka Lowry      Discharge Date: 12/20/2024 1:02 PM    Procedure(s) (LRB):  LT Piriformis Inj (Left)    Final Diagnosis: Myofascial pain syndrome [M79.18]    Disposition: Home or self care    Patient Instructions:   Current Discharge Medication List        CONTINUE these medications which have NOT CHANGED    Details   amitriptyline (ELAVIL) 10 MG tablet TAKE 1 TABLET BY MOUTH ONCE DAILY AT NIGHT AT BEDTIME      amLODIPine (NORVASC) 10 MG tablet Take 10 mg by mouth.      atorvastatin (LIPITOR) 40 MG tablet Take 40 mg by mouth every evening.      diltiaZEM (CARDIZEM LA) 180 mg 24 hr tablet Take 180 mg by mouth once daily.      HUMALOG KWIKPEN INSULIN 100 unit/mL pen Inject into the skin.      hydroCHLOROthiazide (HYDRODIURIL) 12.5 MG Tab Take 12.5 mg by mouth once daily.      insulin glargine (LANTUS) 100 unit/mL injection Inject 25 Units into the skin once daily.      irbesartan (AVAPRO) 75 MG tablet Take 75 mg by mouth every evening.      oxyCODONE (ROXICODONE) 10 mg Tab immediate release tablet Take 1 tablet (10 mg total) by mouth every 6 (six) hours as needed for Pain.  Qty: 28 tablet, Refills: 0    Comments: Quantity prescribed more than 7 day supply? No  Associated Diagnoses: S/P lumbar fusion      pregabalin (LYRICA) 50 MG capsule Take 1 capsule (50 mg total) by mouth 3 (three) times daily.  Qty: 90 capsule, Refills: 2      celecoxib (CELEBREX) 200 MG capsule Take 1 capsule (200 mg total) by mouth once daily.  Qty: 30 capsule, Refills: 0      docusate sodium (COLACE) 100 MG capsule Take 1 capsule (100 mg total) by mouth 2 (two) times daily as needed for Constipation.  Qty: 30 capsule, Refills: 0      GLUCAGON EMERGENCY KIT, HUMAN, 1 mg injection Inject 1 mL into the muscle.      ondansetron (ZOFRAN-ODT) 4 MG TbDL Dissolve 2 tablets (8 mg total) by mouth every 8 (eight) hours as  needed.  Qty: 30 tablet, Refills: 0      sulfamethoxazole-trimethoprim 800-160mg (BACTRIM DS) 800-160 mg Tab Take 1 tablet by mouth 2 (two) times daily.  Qty: 14 tablet, Refills: 0                 Discharge Diagnosis: Myofascial pain syndrome [M79.18]  Condition on Discharge: Stable with no complications to procedure   Diet on Discharge: Same as before.  Activity: as per instruction sheet.  Discharge to: Home with a responsible adult.  Follow up: 2-4 weeks       Please call my office or pager at [phone] if experienced any weakness or loss of sensation, fever > 101.5, pain uncontrolled with oral medications, persistent nausea/vomiting/or diarrhea, redness or drainage from the incisions, or any other worrisome concerns. If physician on call was not reached or could not communicate with our office for any reason please go to the nearest emergency department      Tomeka Lowry MD PhD

## 2024-12-20 NOTE — OP NOTE
Piriformis Muscle Injection under Fluoroscopic Guidance    The procedure, risks, benefits, and options were discussed with the patient. There are no contraindications to the procedure. The patent expressed understanding and agreed to the procedure. Informed written consent was obtained prior to the start of the procedure and can be found in the patient's chart.    PATIENT NAME: David Solomon   MRN: [id number]     DATE OF PROCEDURE: 12/20/2024    PROCEDURE: Left Piriformis Muscle Injection under Fluoroscopic Guidance    PRE-OP DIAGNOSIS: Myofascial pain syndrome [M79.18]    POST-OP DIAGNOSIS: Same    PHYSICIAN: Tomeka Lowry MD    ASSISTANTS: None     MEDICATIONS INJECTED: Preservative-free Kenalog 40mg with 3cc of Bupivacaine 0.25%     LOCAL ANESTHETIC INJECTED: Xylocaine 2%     SEDATION: None    ESTIMATED BLOOD LOSS: None    COMPLICATIONS: None    TECHNIQUE: Time-out was performed to identify the patient and procedure to be performed. With the patient laying in a prone position, the surgical area was prepped and draped in the usual sterile fashion using ChloraPrep and a fenestrated drape.The area overlying the right piriformis muscle was identified under fluoroscopy in the AP view. Skin anesthesia was achieved by injecting Lidocaine 2% over the injection sites. A 25 gauge,  3.5 inch spinal quinke needle was then advanced 3 cm inferior and 3 cm lateral to the inferior aspect of the SI joint. Once the piriformis muscle was thought to be encountered, Contrast dye  Omnipaque (300mg/mL) was injected to confirm placement and there was no vascular runoff. Confirmation of spread was identified within the piriformis muscle using AP fluoroscopic views. Then  4 mL of the medication mixture listed above was injected slowly. The needles were removed and bleeding was nil. A sterile dressing was applied. No specimens collected. The patient tolerated the procedure well.     The patient was monitored after the procedure in the  recovery area. They were given post-procedure and discharge instructions to follow at home. The patient was discharged in a stable condition.    Tomeka Lowry MD

## 2024-12-27 ENCOUNTER — TELEPHONE (OUTPATIENT)
Dept: PAIN MEDICINE | Facility: CLINIC | Age: 74
End: 2024-12-27
Payer: MEDICARE

## 2025-01-07 ENCOUNTER — OFFICE VISIT (OUTPATIENT)
Dept: OPTOMETRY | Facility: CLINIC | Age: 75
End: 2025-01-07
Payer: MEDICARE

## 2025-01-07 DIAGNOSIS — H16.223 KERATOCONJUNCTIVITIS SICCA, NOT SPECIFIED AS SJOGREN'S, BILATERAL: ICD-10-CM

## 2025-01-07 DIAGNOSIS — H00.011 HORDEOLUM EXTERNUM OF RIGHT UPPER EYELID: Primary | ICD-10-CM

## 2025-01-07 DIAGNOSIS — H00.012 HORDEOLUM EXTERNUM OF RIGHT LOWER EYELID: ICD-10-CM

## 2025-01-07 DIAGNOSIS — H02.889 MGD (MEIBOMIAN GLAND DYSFUNCTION): ICD-10-CM

## 2025-01-07 PROCEDURE — 3288F FALL RISK ASSESSMENT DOCD: CPT | Mod: CPTII,S$GLB,, | Performed by: OPTOMETRIST

## 2025-01-07 PROCEDURE — 1159F MED LIST DOCD IN RCRD: CPT | Mod: CPTII,S$GLB,, | Performed by: OPTOMETRIST

## 2025-01-07 PROCEDURE — 99999 PR PBB SHADOW E&M-EST. PATIENT-LVL III: CPT | Mod: PBBFAC,,, | Performed by: OPTOMETRIST

## 2025-01-07 PROCEDURE — 99213 OFFICE O/P EST LOW 20 MIN: CPT | Mod: S$GLB,,, | Performed by: OPTOMETRIST

## 2025-01-07 PROCEDURE — 1125F AMNT PAIN NOTED PAIN PRSNT: CPT | Mod: CPTII,S$GLB,, | Performed by: OPTOMETRIST

## 2025-01-07 PROCEDURE — 1101F PT FALLS ASSESS-DOCD LE1/YR: CPT | Mod: CPTII,S$GLB,, | Performed by: OPTOMETRIST

## 2025-01-07 RX ORDER — NEOMYCIN SULFATE, POLYMYXIN B SULFATE AND DEXAMETHASONE 3.5; 10000; 1 MG/ML; [USP'U]/ML; MG/ML
1 SUSPENSION/ DROPS OPHTHALMIC 4 TIMES DAILY
Qty: 5 ML | Refills: 0 | Status: SHIPPED | OUTPATIENT
Start: 2025-01-07 | End: 2025-01-14

## 2025-01-08 ENCOUNTER — OFFICE VISIT (OUTPATIENT)
Dept: ORTHOPEDICS | Facility: CLINIC | Age: 75
End: 2025-01-08
Attending: ORTHOPAEDIC SURGERY
Payer: MEDICARE

## 2025-01-08 DIAGNOSIS — M54.16 LUMBAR RADICULOPATHY: Primary | ICD-10-CM

## 2025-01-08 DIAGNOSIS — M47.816 LUMBAR SPONDYLOSIS: ICD-10-CM

## 2025-01-08 DIAGNOSIS — Z98.1 S/P LUMBAR FUSION: ICD-10-CM

## 2025-01-08 PROCEDURE — 99214 OFFICE O/P EST MOD 30 MIN: CPT | Mod: S$GLB,,, | Performed by: ORTHOPAEDIC SURGERY

## 2025-01-08 PROCEDURE — 1159F MED LIST DOCD IN RCRD: CPT | Mod: CPTII,S$GLB,, | Performed by: ORTHOPAEDIC SURGERY

## 2025-01-08 PROCEDURE — 1101F PT FALLS ASSESS-DOCD LE1/YR: CPT | Mod: CPTII,S$GLB,, | Performed by: ORTHOPAEDIC SURGERY

## 2025-01-08 PROCEDURE — 99999 PR PBB SHADOW E&M-EST. PATIENT-LVL III: CPT | Mod: PBBFAC,,, | Performed by: ORTHOPAEDIC SURGERY

## 2025-01-08 PROCEDURE — 1160F RVW MEDS BY RX/DR IN RCRD: CPT | Mod: CPTII,S$GLB,, | Performed by: ORTHOPAEDIC SURGERY

## 2025-01-08 PROCEDURE — 1125F AMNT PAIN NOTED PAIN PRSNT: CPT | Mod: CPTII,S$GLB,, | Performed by: ORTHOPAEDIC SURGERY

## 2025-01-08 PROCEDURE — 3288F FALL RISK ASSESSMENT DOCD: CPT | Mod: CPTII,S$GLB,, | Performed by: ORTHOPAEDIC SURGERY

## 2025-01-08 NOTE — PROGRESS NOTES
HPI    CC: 75 yo M presents today for urgent visit. Pt reports eye pain and   swelling around RUL. Using warm compresses and OTC gtts for relief. No   vision complaints.    FABY: 2 months ago with outside provider    Which eye: right eye  How lon week  Improvement: no  (+)redness  (+)itching   (+)pain  (-)light sensitivity   (-)changes in vision   (-)discharge  (-)tearing   (-)CL wear   (+)gtt use, Systane gtts       Last edited by Tiffani Ga MA on 2025  3:32 PM.            Assessment /Plan     For exam results, see Encounter Report.    Hordeolum externum of right upper eyelid  -     neomycin-polymyxin-dexamethasone (MAXITROL) 3.5mg/mL-10,000 unit/mL-0.1 % DrpS; Place 1 drop into the right eye 4 (four) times daily. for 7 days  Dispense: 5 mL; Refill: 0    Hordeolum externum of right lower eyelid  -     neomycin-polymyxin-dexamethasone (MAXITROL) 3.5mg/mL-10,000 unit/mL-0.1 % DrpS; Place 1 drop into the right eye 4 (four) times daily. for 7 days  Dispense: 5 mL; Refill: 0    MGD (meibomian gland dysfunction)    Keratoconjunctivitis sicca, not specified as Sjogren's, bilateral      1-2. Educated pt on findings. Stye RUL and RLL. Rx maxitrol 1 gtt OD QID for 7 days then d/c gtt. Recommend warm compresses with light massage over lesion for 10-15min TID. ATs prn.   Discussed importance of compresses, will aid in resolving lesion. Recommend warm compresses OU Qday to prevent styes in the future.    If symptoms worsen or dont improve, RTC. Monitor.      3-4. Educated pt on findings. Recommended ATs TID-QID + stevie/gel QHS for added lubrication and comfort. Discussed pt may benefit from dry eye eval. If symptoms worsen or dont improve, RTC. Monitor.        RTC with any worsening

## 2025-02-11 ENCOUNTER — OFFICE VISIT (OUTPATIENT)
Dept: PAIN MEDICINE | Facility: CLINIC | Age: 75
End: 2025-02-11
Payer: MEDICARE

## 2025-02-11 VITALS
HEART RATE: 83 BPM | BODY MASS INDEX: 25.88 KG/M2 | WEIGHT: 164.88 LBS | SYSTOLIC BLOOD PRESSURE: 109 MMHG | DIASTOLIC BLOOD PRESSURE: 64 MMHG | HEIGHT: 67 IN

## 2025-02-11 DIAGNOSIS — M54.16 LUMBAR RADICULOPATHY: ICD-10-CM

## 2025-02-11 DIAGNOSIS — M96.1 POSTLAMINECTOMY SYNDROME OF LUMBAR REGION: Primary | ICD-10-CM

## 2025-02-11 DIAGNOSIS — M51.362 DEGENERATION OF INTERVERTEBRAL DISC OF LUMBAR REGION WITH DISCOGENIC BACK PAIN AND LOWER EXTREMITY PAIN: ICD-10-CM

## 2025-02-11 PROCEDURE — 3288F FALL RISK ASSESSMENT DOCD: CPT | Mod: CPTII,S$GLB,, | Performed by: STUDENT IN AN ORGANIZED HEALTH CARE EDUCATION/TRAINING PROGRAM

## 2025-02-11 PROCEDURE — 1159F MED LIST DOCD IN RCRD: CPT | Mod: CPTII,S$GLB,, | Performed by: STUDENT IN AN ORGANIZED HEALTH CARE EDUCATION/TRAINING PROGRAM

## 2025-02-11 PROCEDURE — 99214 OFFICE O/P EST MOD 30 MIN: CPT | Mod: S$GLB,,, | Performed by: STUDENT IN AN ORGANIZED HEALTH CARE EDUCATION/TRAINING PROGRAM

## 2025-02-11 PROCEDURE — 3078F DIAST BP <80 MM HG: CPT | Mod: CPTII,S$GLB,, | Performed by: STUDENT IN AN ORGANIZED HEALTH CARE EDUCATION/TRAINING PROGRAM

## 2025-02-11 PROCEDURE — 99999 PR PBB SHADOW E&M-EST. PATIENT-LVL IV: CPT | Mod: PBBFAC,,, | Performed by: STUDENT IN AN ORGANIZED HEALTH CARE EDUCATION/TRAINING PROGRAM

## 2025-02-11 PROCEDURE — G2211 COMPLEX E/M VISIT ADD ON: HCPCS | Mod: S$GLB,,, | Performed by: STUDENT IN AN ORGANIZED HEALTH CARE EDUCATION/TRAINING PROGRAM

## 2025-02-11 PROCEDURE — 1125F AMNT PAIN NOTED PAIN PRSNT: CPT | Mod: CPTII,S$GLB,, | Performed by: STUDENT IN AN ORGANIZED HEALTH CARE EDUCATION/TRAINING PROGRAM

## 2025-02-11 PROCEDURE — 1101F PT FALLS ASSESS-DOCD LE1/YR: CPT | Mod: CPTII,S$GLB,, | Performed by: STUDENT IN AN ORGANIZED HEALTH CARE EDUCATION/TRAINING PROGRAM

## 2025-02-11 PROCEDURE — 3008F BODY MASS INDEX DOCD: CPT | Mod: CPTII,S$GLB,, | Performed by: STUDENT IN AN ORGANIZED HEALTH CARE EDUCATION/TRAINING PROGRAM

## 2025-02-11 PROCEDURE — 1160F RVW MEDS BY RX/DR IN RCRD: CPT | Mod: CPTII,S$GLB,, | Performed by: STUDENT IN AN ORGANIZED HEALTH CARE EDUCATION/TRAINING PROGRAM

## 2025-02-11 PROCEDURE — 3074F SYST BP LT 130 MM HG: CPT | Mod: CPTII,S$GLB,, | Performed by: STUDENT IN AN ORGANIZED HEALTH CARE EDUCATION/TRAINING PROGRAM

## 2025-02-11 RX ORDER — OXYCODONE AND ACETAMINOPHEN 10; 325 MG/1; MG/1
1 TABLET ORAL EVERY 6 HOURS PRN
Qty: 28 TABLET | Refills: 0 | Status: SHIPPED | OUTPATIENT
Start: 2025-02-11 | End: 2025-02-11

## 2025-02-11 RX ORDER — OXYCODONE AND ACETAMINOPHEN 10; 325 MG/1; MG/1
1 TABLET ORAL EVERY 8 HOURS PRN
Qty: 21 TABLET | Refills: 0 | Status: SHIPPED | OUTPATIENT
Start: 2025-02-11

## 2025-02-11 NOTE — PROGRESS NOTES
Chronic Pain - Established Clinic Visit    Referring Physician: No ref. provider found    Chief Complaint:   Chief Complaint   Patient presents with    Leg Pain        SUBJECTIVE:  INTERVAL HISTORY 2/11/2025:  Mr. Solomon returns for back and left leg pain. Current Pain level is 7/10.  He did report some relief from his left piriformis injection (helped with lateral leg pain), but he reports a lingering left foot pain. He does report that his blood sugar tends to spike with the steroid injections. He does report he continues to work with physical therapy ([facility]), but he requires a pain medication to participate in physical therapy.  He continues to have pain with extending his back or twisting.  He does report he uses a lumbar corset which helps with the pain.    INTERVAL HISTORY 12/10/2024:  Mr. Solomon returns for left leg pain. Current Pain level is 6-8/10.  He reports that he had about 3 weeks of significant relief after his back surgery.  However, after his wife had an accident, he had to walk over 7000 steps.  He reports that afterwards, he developed recurrence of his pain. He had follow up imaging which was negative for any abnormalities. He has tried an epidural injection and he has tried physical therapy as well.    INTERVAL HISTORY 3/5/2024:  Mr. Solomon returns for bilateral leg pain. Current Pain level is 8/10.  He is s/p L4 and L5 intracept procedure. He feels his lower back pain has improved considerably, however his bilateral leg pain has worsened. Since his last visit, he has seen Dr. Yeh (orthopedic surgery) and was suggested to consider L4/5 PLIF.  Mr. Solomon's primary complaint at this time is his radiating bilateral lower extremity pain.    INTERVAL HISTORY 1/23/2023:  Mr. Solomon returns for chronic lower back pain and bilateral leg pain.  He is s/p bilateral L5/S1 transforaminal epidural steroid injection. He reports that the pain that was radiating down his legs has improved for his  bilateral leg pain.  He reports the bilateral leg pain and tingling is 4-5/10.  Prior to the injection, the pain was 10/10. Now, he feels the pain is primarily in the back.  He feels the pain is worst when he is getting out of bed.  He still feels some numbness to the right leg.  He reports his pain gets worse when he moves around his legs.  He reports today his pain level is 0/10 as he took ibuprofen and tylenol prior to coming in to clinic.    INTERVAL HISTORY 12/13/2023:  Mr. Solomon presents for lower back and bilateral leg pain. Current Pain level is 5/10.  He is s/p L5/S1 interlaminar epidural steroid injection with no significant improvement in his pain. He reports that he initially had minimal relief of his left leg, however the pain returned after the weekend. He reports that his pain is 10/10 in the morning and feels better with flexion and taking Ibuprofen.  Coughing/sneezing makes his pain worse.  He continues to try home exercises as tolerated with limited relief in his pain.  He brought in his MRI report and disc from his outside facility which again demonstrates multilevel disc degenerative changes with multilevel foraminal stenosis.     Interval History 11/16/2023:  73-year-old male presents today for a follow-up appointment to discuss his lumbar MRI and be considered for pain interventions.  His pain continues to complain of  low back with radiating symptoms into his legs posteriorly slightly pass his knees bilaterally.  He reports pulling and tightening symptoms in his legs bilaterally walking, standing and repositioning his body increases his pain.   He denies any profound weakness denies any recent incident or trauma denies any bowel bladder dysfunction, denies any saddle anesthesia at this time.    David Solomon presents to the clinic for the evaluation of lower back pain. The pain started 6 weeks ago following lawn work and symptoms have been worsening.The pain is located in the lower back area  and radiates to the hamstrings and down to his calves.  The pain is described as stabbing and is rated as 7/10. The pain is rated with a score of  0/10 on the BEST day and a score of 9/10 on the WORST day.  Symptoms interfere with daily activity. The pain is exacerbated by certain positions, walking, worse in the morning (getting out of bed).  The pain is mitigated by medications (ibuprofen and steroid (one dose).  The patient reports 6 hours of uninterrupted sleep per night.    He has tried biofreeze without relief.  He was given a medrol dose pack, but his blood glucose went extremely high, so he stopped it. He also had a steroid injection at an ED which also severely increased his blood sugar.  He sees his endocrinologist for over 20 years and is currently on a long acting and short acting regimen.  He checks his sugars 10 times a day.    He was seen by Dr. Gonsalves on 5/25/2020 for knee pain and was given some knee rehab exercises.  He continues to participate in [employer].    Patient denies urinary incontinence, bowel incontinence, and significant motor weakness.    Physical Therapy/Home Exercise: no      Pain Disability Index Review:      2/11/2025     1:25 PM 12/10/2024     1:16 PM 3/5/2024     8:30 AM   Last 3 PDI Scores   Pain Disability Index (PDI) 49 63 70       Pain Medications:  Norco 7.5-325mg  Ibuprofen 800mg twice a day     report:  Reviewed and consistent with medication use as prescribed.      Pain Procedures:   12/1/2023 - L5/S1 LESI  2/19/2024 - B/L L5/S1 TFESI  3/15/2024 - B/L L4/5 TFESI  11/15/2024 - L5/S1 LESI (left directed)    Imaging:   MRI LUMBAR SPINE W WO CONTRAST     CLINICAL HISTORY:  Low back pain, prior surgery, new symptoms; Dorsalgia, unspecified     TECHNIQUE:  Multiplanar, multisequence MR images were acquired from the thoracolumbar junction to the sacrum without the administration of contrast.Additional post contrast images after 8 cc Gadovist     COMPARISON:  4/5/2024and  10/26/2023     FINDINGS:  Posterior instrumented fusion L4-5 with intervertebral disc spacer.  L4 laminectomy.     Alignment: Normal.     Vertebrae: 5 lumbar-type vertebral bodies. No aggressive marrow replacement process or fracture.     Discs: Multilevel disc dessication.     Cord: Normal. Conus terminates at T12-L1.     Degenerative findings:     T12-L1: There is no focal disc herniation. No significant central canal narrowing . No significant neural foraminal narrowing.     L1-L2: There is no focal disc herniation. No significant central canal narrowing . No significant neural foraminal narrowing.     L2-L3: Broad based mild diffuse bulging of disc material .  Mild central canal narrowing . No significant neural foraminal narrowing.     L3-L4: Broad-based disc bulge, facet degenerative change and ligamentum flavum thickening which contribute to moderate central canal narrowing . Mild bilateral neural foraminal narrowing.     L4-L5: There is no focal disc herniation. No significant central canal narrowing . No significant neural foraminal narrowing.     L5-S1: There is no focal disc herniation. No significant central canal narrowing . No significant neural foraminal narrowing.     Paraspinal muscles & soft tissues: There is a subcutaneous postoperative collection measuring 1.9 x 9.0 x 6.0 cm extending from L3 through L5 levels.  This demonstrates continuity with laminectomy defect as noted on sagittal image 8 axial image 36/37 with fluid extending into the laminectomy bed.  There is no evidence for compression upon the thecal sac or communication with the thecal sac.     Postcontrast images demonstrate postoperative collection with minimal peripheral enhancement, most consistent with postoperative change inclusive of seroma/hematoma with abscess felt to be unlikely.  No abnormal enhancing intrathecal lesions otherwise noted.     Impression:     S/P posterior instrumented fusion L4-L5 with intervertebral disc  spacer.  L4 laminectomy.  Postoperative collection as detailed above without evidence for mass effect upon the thecal sac or definite communication with the thecal sac.     Lumbar spondylosis L2-L3 and L3-L4 as above.        Electronically signed by:Robby Hubbard MD  Date:                                            11/03/2024  Time:                                           12:22    XR KNEE ORTHO LEFT WITH FLEXION     CLINICAL HISTORY:  . Pain in left knee     TECHNIQUE:  AP standing view of both knees, PA flexion standing views of both knees, and Merchant views of both knees were performed. A lateral view of the left knee was also performed.     COMPARISON:  Radiographs dating back 2019 under 2 cm size of subcortical remote cystic change medial left tibial plateau epiphyseal plate, minimal mild medial posterior patellar articular facet DJD.     FINDINGS:  Under 2 cm size subcortical cystic remote change medial left tibial plateau epiphyseal plate stable.  Minimal mild posteromedial patella articular facet DJD stable.     Impression:     No new fracture dislocation or joint effusions.        Electronically signed by:Naeem Ortiz MD  Date:                                            11/05/2024  Time:                                           11:45      XR TIBIA FIBULA 2 VIEW LEFT     CLINICAL HISTORY:  Pain in left leg     TECHNIQUE:  AP and lateral views of the left tibia and fibula were performed.     COMPARISON:  Knee radiographs and report same day.     FINDINGS:  Focal subcortical remote cystic change medial tibial plateau epiphyseal plate.  Bone, joint and soft tissues otherwise normal.  Calcified plaque portions of popliteal and popliteal trifurcation, tibial arteries including dorsalis pedis artery.     Impression:     No fracture dislocation.  Additional findings above.        Electronically signed by:Naeem Ortiz MD  Date:                                            11/05/2024  Time:                                            11:48    XR LUMBAR SPINE AP AND LATERAL     CLINICAL HISTORY:  Other intervertebral disc degeneration, lumbar region without mention of lumbar back pain or lower extremity pain     TECHNIQUE:  AP, lateral and spot images were performed of the lumbar spine.     COMPARISON:  09/24/2024     FINDINGS:  Postoperative changes L4-5 posterior fusion with interbody spacer.  Hardware and alignment appears stable.  There is mild scoliosis with straightening of the normal lumbar lordosis.  There is intervertebral disc space narrowing and marginal osteophyte formation at the non operative levels.  No acute fracture or osseous destructive process.  Sacroiliac joints are symmetric.  Postoperative changes of prior lower abdominal hernia repair.     Impression:     Stable postoperative changes lumbar spine.        Electronically signed by:Sanjeev Thorne MD  Date:                                            10/24/2024  Time:                                           11:59    Past Medical History:   Diagnosis Date    Cataract     Diabetes mellitus     Diabetic retinopathy     Hypertension      Past Surgical History:   Procedure Laterality Date    CATARACT EXTRACTION Right 03/27/2022    Dr. Ricketts    DESTRUCTION, INTRAOSSEOUS BASIVERTEBRAL NERVE, 1ST TWO BODIES, LUM/SAC N/A 2/19/2024    Procedure: DESTRUCTION,INTRAOSSEOUS BASIVERTEBRAL NERVE,1ST TWO BODIES,LUM/SAC;  Surgeon: Tomeka Lowry MD;  Location: [facility] OR;  Service: Pain Management;  Laterality: N/A;    EPIDURAL STEROID INJECTION INTO LUMBAR SPINE N/A 12/1/2023    Procedure: L5-S1 LESI;  Surgeon: Tomeka Lowry MD;  Location: [facility] PAIN MANAGEMENT;  Service: Pain Management;  Laterality: N/A;  oral 20 mins    EPIDURAL STEROID INJECTION INTO LUMBAR SPINE Bilateral 1/2/2024    Procedure: B/L L5-S1 TFESI;  Surgeon: Tyree Ochoa DO;  Location: [facility] PAIN MANAGEMENT;  Service: Pain Management;  Laterality: Bilateral;  20 mins    EPIDURAL STEROID  INJECTION INTO LUMBAR SPINE Bilateral 3/15/2024    Procedure: B/L L4-5 TFESI;  Surgeon: Tomeka Lowry MD;  Location: [facility] PAIN MANAGEMENT;  Service: Pain Management;  Laterality: Bilateral;  20 mins    EPIDURAL STEROID INJECTION INTO LUMBAR SPINE N/A 11/15/2024    Procedure: L5-S1 (AIM LEFT);  Surgeon: Tomeka Lowry MD;  Location: [facility] PAIN MANAGEMENT;  Service: Pain Management;  Laterality: N/A;  20 mins  no AC    FUSION, SPINE, LUMBAR, TLIF, POSTERIOR APPROACH, USING PEDICLE SCREW N/A 8/27/2024    Procedure: FUSION, SPINE, LUMBAR, TLIF, POSTERIOR APPROACH, USING PEDICLE SCREW L4-5 (L);  Surgeon: Suresh Yeh MD;  Location: [facility] OR 2ND FLR;  Service: Neurosurgery;  Laterality: N/A;    INTRAOCULAR PROSTHESES INSERTION Right 3/27/2022    Procedure: INSERTION, IOL PROSTHESIS;  Surgeon: Divine Ricketts MD;  Location: [facility] OR 1ST FLR;  Service: Ophthalmology;  Laterality: Right;    INTRAOCULAR PROSTHESES INSERTION Left 5/15/2022    Procedure: INSERTION, IOL PROSTHESIS;  Surgeon: Divine Ricketts MD;  Location: [facility] OR 1ST FLR;  Service: Ophthalmology;  Laterality: Left;    PHACOEMULSIFICATION OF CATARACT Right 3/27/2022    Procedure: PHACOEMULSIFICATION, CATARACT;  Surgeon: Divine Ricketts MD;  Location: [facility] OR 1ST FLR;  Service: Ophthalmology;  Laterality: Right;    PHACOEMULSIFICATION OF CATARACT Left 5/15/2022    Procedure: PHACOEMULSIFICATION, CATARACT;  Surgeon: Divine Ricketts MD;  Location: [facility] OR 1ST FLR;  Service: Ophthalmology;  Laterality: Left;    PIRIFORMIS BLOCK Left 12/20/2024    Procedure: LT Piriformis Inj;  Surgeon: Tomeka Lowry MD;  Location: [facility] PAIN MANAGEMENT;  Service: Pain Management;  Laterality: Left;  no sed-no ac     Social History     Socioeconomic History    Marital status:    Tobacco Use    Smoking status: Never    Smokeless tobacco: Never   Substance and Sexual Activity    Alcohol use: No    Drug use: No    Sexual activity: Not Currently     Social Drivers of Health      Financial Resource Strain: Low Risk  (9/5/2024)    Overall Financial Resource Strain (CARDIA)     Difficulty of Paying Living Expenses: Not very hard   Food Insecurity: No Food Insecurity (9/5/2024)    Hunger Vital Sign     Worried About Running Out of Food in the Last Year: Never true     Ran Out of Food in the Last Year: Never true   Transportation Needs: No Transportation Needs (8/28/2024)    TRANSPORTATION NEEDS     Transportation : No   Physical Activity: Inactive (9/5/2024)    Exercise Vital Sign     Days of Exercise per Week: 0 days     Minutes of Exercise per Session: 0 min   Stress: No Stress Concern Present (9/5/2024)    [employer] [employer] - Occupational Stress Questionnaire     Feeling of Stress : Not at all   Housing Stability: Unknown (9/5/2024)    Housing Stability Vital Sign     Unable to Pay for Housing in the Last Year: No     Homeless in the Last Year: No     Family History   Problem Relation Name Age of Onset    No Known Problems Mother      No Known Problems Father      Melanoma Neg Hx         Review of patient's allergies indicates:  No Known Allergies    Current Outpatient Medications   Medication Sig    amitriptyline (ELAVIL) 10 MG tablet TAKE 1 TABLET BY MOUTH ONCE DAILY AT NIGHT AT BEDTIME    amLODIPine (NORVASC) 10 MG tablet Take 10 mg by mouth.    atorvastatin (LIPITOR) 40 MG tablet Take 40 mg by mouth every evening.    celecoxib (CELEBREX) 200 MG capsule Take 1 capsule (200 mg total) by mouth once daily.    diltiaZEM (CARDIZEM LA) 180 mg 24 hr tablet Take 180 mg by mouth once daily.    docusate sodium (COLACE) 100 MG capsule Take 1 capsule (100 mg total) by mouth 2 (two) times daily as needed for Constipation.    GLUCAGON EMERGENCY KIT, HUMAN, 1 mg injection Inject 1 mL into the muscle.    HUMALOG KWIKPEN INSULIN 100 unit/mL pen Inject into the skin.    hydroCHLOROthiazide (HYDRODIURIL) 12.5 MG Tab Take 12.5 mg by mouth once daily.    insulin glargine (LANTUS)  100 unit/mL injection Inject 25 Units into the skin once daily.    irbesartan (AVAPRO) 75 MG tablet Take 75 mg by mouth every evening.    ondansetron (ZOFRAN-ODT) 4 MG TbDL Dissolve 2 tablets (8 mg total) by mouth every 8 (eight) hours as needed.    oxyCODONE (ROXICODONE) 10 mg Tab immediate release tablet Take 1 tablet (10 mg total) by mouth every 6 (six) hours as needed for Pain.    pregabalin (LYRICA) 50 MG capsule Take 1 capsule (50 mg total) by mouth 3 (three) times daily.    sulfamethoxazole-trimethoprim 800-160mg (BACTRIM DS) 800-160 mg Tab Take 1 tablet by mouth 2 (two) times daily.    oxyCODONE-acetaminophen (PERCOCET)  mg per tablet Take 1 tablet by mouth every 8 (eight) hours as needed for Pain.     No current facility-administered medications for this visit.     Facility-Administered Medications Ordered in Other Visits   Medication    phenylephrine HCL 2.5% ophthalmic solution 1 drop    phenylephrine HCL 2.5% ophthalmic solution 1 drop    proparacaine 0.5 % ophthalmic solution 1 drop    proparacaine 0.5 % ophthalmic solution 1 drop    tropicamide 1% ophthalmic solution 1 drop    tropicamide 1% ophthalmic solution 1 drop       REVIEW OF SYSTEMS:    GENERAL:  No weight loss, malaise or fevers.  HEENT:  Negative for frequent or significant headaches.  NECK:  Negative for lumps, goiter, pain and significant neck swelling.  RESPIRATORY:  Negative for cough, wheezing or shortness of breath.  CARDIOVASCULAR:  Negative for chest pain, leg swelling or palpitations.  GI:  Negative for abdominal discomfort, blood in stools or black stools or change in bowel habits.  MUSCULOSKELETAL:  See HPI.  SKIN:  Negative for lesions, rash, and itching.  PSYCH:  Negative for sleep disturbance, mood disorder and recent psychosocial stressors.  HEMATOLOGY/LYMPHOLOGY:  Negative for prolonged bleeding, bruising easily or swollen nodes. +DM type 1  NEURO:   No history of headaches, syncope, paralysis, seizures or tremors.  All  "other reviewed and negative other than HPI.    OBJECTIVE:    /64 (BP Location: Left arm, Patient Position: Sitting)   Pulse 83   Ht 5' 7" (1.702 m)   Wt 74.8 kg (164 lb 14.5 oz)   BMI 25.83 kg/m²     PHYSICAL EXAMINATION:  General appearance: Well appearing, in no acute distress, alert and appropriately communicative.  Psych:  Mood and affect appropriate.  Skin: Skin color, texture, turgor normal, no rashes or lesions, in both upper and lower body.  Head/face:  Atraumatic, normocephalic.  Cor: regular rate  Pulm: non-labored breathing  GI: Abdomen non-distended and non-tender.  Back: Straight leg raising in the sitting and supine positions is positive to radicular pain left > right. No pain to palpation over the spine or paraspinal muscles.   Extremities: Peripheral joint ROM is full and pain free without obvious instability or laxity in all four extremities. +left piriformis pain; No deformities, edema, or skin discoloration. Good capillary refill.  Musculoskeletal:  hip, sacroiliac and knee provocative maneuvers are negative. Bilateral upper and lower extremity strength is normal and symmetric.  No atrophy or tone abnormalities are noted.  Neuro: Bilateral upper and lower extremity coordination and muscle stretch reflexes are physiologic and symmetric.  Negative Clonus. No loss of sensation is noted.  Gait: Normal.    Lab Results   Component Value Date    CREATININE 1.2 08/28/2024     CMP  Sodium   Date Value Ref Range Status   08/28/2024 133 (L) 136 - 145 mmol/L Final     Potassium   Date Value Ref Range Status   08/28/2024 4.6 3.5 - 5.1 mmol/L Final     Chloride   Date Value Ref Range Status   08/28/2024 106 95 - 110 mmol/L Final     CO2   Date Value Ref Range Status   08/28/2024 20 (L) 23 - 29 mmol/L Final     Glucose   Date Value Ref Range Status   08/28/2024 192 (H) 70 - 110 mg/dL Final     BUN   Date Value Ref Range Status   08/28/2024 22 8 - 23 mg/dL Final     Creatinine   Date Value Ref Range " Status   08/28/2024 1.2 0.5 - 1.4 mg/dL Final     Calcium   Date Value Ref Range Status   08/28/2024 8.7 8.7 - 10.5 mg/dL Final     Total Protein   Date Value Ref Range Status   08/28/2024 5.9 (L) 6.0 - 8.4 g/dL Final     Albumin   Date Value Ref Range Status   08/28/2024 3.2 (L) 3.5 - 5.2 g/dL Final     Total Bilirubin   Date Value Ref Range Status   08/28/2024 0.8 0.1 - 1.0 mg/dL Final     Comment:     For infants and newborns, interpretation of results should be based  on gestational age, weight and in agreement with clinical  observations.    Premature Infant recommended reference ranges:  Up to 24 hours.............<8.0 mg/dL  Up to 48 hours............<12.0 mg/dL  3-5 days..................<15.0 mg/dL  6-29 days.................<15.0 mg/dL       Alkaline Phosphatase   Date Value Ref Range Status   08/28/2024 73 55 - 135 U/L Final     AST   Date Value Ref Range Status   08/28/2024 27 10 - 40 U/L Final     ALT   Date Value Ref Range Status   08/28/2024 20 10 - 44 U/L Final     Anion Gap   Date Value Ref Range Status   08/28/2024 7 (L) 8 - 16 mmol/L Final     eGFR   Date Value Ref Range Status   08/28/2024 >60.0 >60 mL/min/1.73 m^2 Final         ASSESSMENT: 74 y.o. year old male with lower back pain, consistent with:     1. Postlaminectomy syndrome of lumbar region  Procedure Order to Pain Management    Ambulatory referral/consult to Psychology    oxyCODONE-acetaminophen (PERCOCET)  mg per tablet    DISCONTINUED: oxyCODONE-acetaminophen (PERCOCET)  mg per tablet      2. Lumbar radiculopathy  Procedure Order to Pain Management    Ambulatory referral/consult to Psychology      3. Degeneration of intervertebral disc of lumbar region with discogenic back pain and lower extremity pain  Procedure Order to Pain Management    Ambulatory referral/consult to Psychology          IMPRESSION: Mr. Solomon returns with lower back and bilateral lower extremity pain.  He is s/p L5/S1 interlaminar epidural injection  with no improvement in his pain. History and physical exam are consistent with lumbar radiculopathy, specifically following the L5 distribution on the left. Imaging is consistent with lumbar degenerative disc disease with central and foraminal stenosis at L4/5 and L5/S1.  He has failed multiple interventions in the past, but his pain may be related to piriformis pain at this point.  We will offer another intervention, however if he fails to get relief, we may need to start discussions regarding spinal cord stimulation.    PLAN:   - I have stressed the importance of physical activity and a home exercise plan to help with pain and improve health.  - continue pregabalin 50mg BID  - given short course of Percocet 10-325mg #21 pills every 8 hours as needed for severe pain; I advised to use this only for emergencies.  This is a ONE TIME prescription, and no further prescriptions will be given   - referral to psychology in consideration for SCS trial  - schedule for SCS trial with [facility]  - will discuss with Dr. Yeh in the mean time.  - follow up after SCS Trial    The above plan and management options were discussed at length with patient. Patient is in agreement with the above and verbalized understanding. It will be communicated with the referring physician via electronic record, fax, or mail.  > 40 minutes were spent discussing all options including prior therapies.    Tomeka Lowry MD  Interventional Pain Management    02/11/2025

## 2025-02-26 ENCOUNTER — TELEPHONE (OUTPATIENT)
Dept: PSYCHIATRY | Facility: CLINIC | Age: 75
End: 2025-02-26
Payer: MEDICARE

## 2025-03-28 ENCOUNTER — OFFICE VISIT (OUTPATIENT)
Dept: OTOLARYNGOLOGY | Facility: CLINIC | Age: 75
End: 2025-03-28
Payer: MEDICARE

## 2025-03-28 VITALS
BODY MASS INDEX: 25.53 KG/M2 | DIASTOLIC BLOOD PRESSURE: 76 MMHG | SYSTOLIC BLOOD PRESSURE: 163 MMHG | HEART RATE: 67 BPM | WEIGHT: 163 LBS

## 2025-03-28 DIAGNOSIS — H92.22 BLOOD IN EAR CANAL, LEFT: ICD-10-CM

## 2025-03-28 DIAGNOSIS — H60.8X3 CHRONIC ECZEMATOUS OTITIS EXTERNA OF BOTH EARS: Primary | ICD-10-CM

## 2025-03-28 PROCEDURE — 99999 PR PBB SHADOW E&M-EST. PATIENT-LVL IV: CPT | Mod: PBBFAC,,, | Performed by: NURSE PRACTITIONER

## 2025-03-28 RX ORDER — FLUOCINOLONE ACETONIDE 0.11 MG/ML
3 OIL AURICULAR (OTIC) 2 TIMES DAILY
Qty: 20 ML | Refills: 1 | Status: SHIPPED | OUTPATIENT
Start: 2025-03-28 | End: 2025-04-04

## 2025-03-28 RX ORDER — CETIRIZINE HYDROCHLORIDE 10 MG/1
10 TABLET ORAL DAILY
Qty: 30 TABLET | Refills: 11 | Status: SHIPPED | OUTPATIENT
Start: 2025-03-28 | End: 2026-03-28

## 2025-03-28 RX ORDER — OFLOXACIN 3 MG/ML
3 SOLUTION AURICULAR (OTIC) 2 TIMES DAILY
Qty: 5 ML | Refills: 0 | Status: SHIPPED | OUTPATIENT
Start: 2025-03-28 | End: 2025-04-07

## 2025-03-28 NOTE — PROGRESS NOTES
Patient ID: David Solomon is a 74 y.o. y.o. male    Chief Complaint:   Chief Complaint   Patient presents with    Cerumen Impaction     Ear cleaning        Patient is self-referred for evaluation of a possible wax impaction in bilateral ears. He reports of extreme itchy ears that he has been using q-tips to scratch his ears. He denies ear drainage and hearing loss. He has been wearing ear buds while he is at the gym. He denies allergies with no nasal congestion, runny nose, sneezing, itchy nose.     Review of Systems   Constitutional: Negative for fever, chills, fatigue and unexpected weight change.   HENT: Positive for ear blockage. Negative for hearing loss, nosebleeds, congestion, sore throat, facial swelling, rhinorrhea, sneezing, trouble swallowing, dental problem, voice change, postnasal drip, sinus pressure, tinnitus and ear discharge.    Eyes: Negative for redness, itching and visual disturbance.   Respiratory: Negative for cough, choking and wheezing.    Cardiovascular: Negative for chest pain and palpitations.   Gastrointestinal: Negative for abdominal pain.        No reflux.   Musculoskeletal: Negative for gait problem.   Skin: Negative for rash.   Neurological: Negative for dizziness, light-headedness and headaches.     Past Medical History:   Diagnosis Date    Cataract     Diabetes mellitus     Diabetic retinopathy     Hypertension      Past Surgical History:   Procedure Laterality Date    CATARACT EXTRACTION Right 03/27/2022    Dr. Ricketts    DESTRUCTION, INTRAOSSEOUS BASIVERTEBRAL NERVE, 1ST TWO BODIES, LUM/SAC N/A 2/19/2024    Procedure: DESTRUCTION,INTRAOSSEOUS BASIVERTEBRAL NERVE,1ST TWO BODIES,LUM/SAC;  Surgeon: Tomeka Lowry MD;  Location: [facility] OR;  Service: Pain Management;  Laterality: N/A;    EPIDURAL STEROID INJECTION INTO LUMBAR SPINE N/A 12/1/2023    Procedure: L5-S1 LESI;  Surgeon: Tomeka Lowry MD;  Location: [facility] PAIN MANAGEMENT;  Service: Pain Management;  Laterality: N/A;  oral 20 mins     EPIDURAL STEROID INJECTION INTO LUMBAR SPINE Bilateral 1/2/2024    Procedure: B/L L5-S1 TFESI;  Surgeon: Tyree Ochoa DO;  Location: [facility] PAIN MANAGEMENT;  Service: Pain Management;  Laterality: Bilateral;  20 mins    EPIDURAL STEROID INJECTION INTO LUMBAR SPINE Bilateral 3/15/2024    Procedure: B/L L4-5 TFESI;  Surgeon: Tomeka Lowry MD;  Location: [facility] PAIN MANAGEMENT;  Service: Pain Management;  Laterality: Bilateral;  20 mins    EPIDURAL STEROID INJECTION INTO LUMBAR SPINE N/A 11/15/2024    Procedure: L5-S1 (AIM LEFT);  Surgeon: Tomeka Lowry MD;  Location: [facility] PAIN MANAGEMENT;  Service: Pain Management;  Laterality: N/A;  20 mins  no AC    FUSION, SPINE, LUMBAR, TLIF, POSTERIOR APPROACH, USING PEDICLE SCREW N/A 8/27/2024    Procedure: FUSION, SPINE, LUMBAR, TLIF, POSTERIOR APPROACH, USING PEDICLE SCREW L4-5 (L);  Surgeon: Suresh Yeh MD;  Location: [facility] OR 2ND FLR;  Service: Neurosurgery;  Laterality: N/A;    INTRAOCULAR PROSTHESES INSERTION Right 3/27/2022    Procedure: INSERTION, IOL PROSTHESIS;  Surgeon: Divine Ricketts MD;  Location: [facility] OR 1ST FLR;  Service: Ophthalmology;  Laterality: Right;    INTRAOCULAR PROSTHESES INSERTION Left 5/15/2022    Procedure: INSERTION, IOL PROSTHESIS;  Surgeon: Divine Ricketts MD;  Location: [facility] OR 1ST FLR;  Service: Ophthalmology;  Laterality: Left;    PHACOEMULSIFICATION OF CATARACT Right 3/27/2022    Procedure: PHACOEMULSIFICATION, CATARACT;  Surgeon: Divine Ricketts MD;  Location: [facility] OR 1ST FLR;  Service: Ophthalmology;  Laterality: Right;    PHACOEMULSIFICATION OF CATARACT Left 5/15/2022    Procedure: PHACOEMULSIFICATION, CATARACT;  Surgeon: Divine Ricketts MD;  Location: [facility] OR 1ST FLR;  Service: Ophthalmology;  Laterality: Left;    PIRIFORMIS BLOCK Left 12/20/2024    Procedure: LT Piriformis Inj;  Surgeon: Tomeka Lowry MD;  Location: [facility] PAIN MANAGEMENT;  Service: Pain Management;  Laterality: Left;  no sed-no ac     Social  History[1]  Family History   Problem Relation Name Age of Onset    No Known Problems Mother      No Known Problems Father      Melanoma Neg Hx         Objective:      Vitals:    03/28/25 1001   BP: (!) 163/76   Pulse: 67       Physical Exam   Constitutional: Well appearing / communicating without difficutly.  NAD.  Eyes: EOM I Bilaterally  Head/Face: Normocephalic. Negative paranasal sinus pressure/tenderness.  Salivary glands WNL.  House Brackmann I Bilaterally.     Right Ear: Auricle with dry, scaly skin. External Auditory Canal with dry skin, no lesions or masses,TM w/o masses/lesions/perforations. TM mobility noted.   Left Ear: Auricle with dry, scaly skin. . External Auditory Canal with dry skin and dry blood. TM w/o masses/lesions/perforations. TM mobility noted.  Nose: No gross nasal septal deviation. Inferior Turbinates 3+ bilaterally. No septal perforation. No masses/lesions. External nasal skin appears normal without masses/lesions.   Oral Cavity: Gingiva/lips within normal limits.  Dentition/gingiva healthy appearing. Mucus membranes moist. Floor of mouth soft, no masses palpated. Oral Tongue mobile. Hard Palate appears normal.    Oropharynx: Base of tongue appears normal. No masses/lesions noted. Tonsillar fossa/pharyngeal wall without lesions. Posterior oropharynx WNL.  Soft palate without masses. Midline uvula.   Neck/Lymphatic: No LAD I-VI bilaterally.  No thyromegaly.  No masses noted on exam.     Mirror laryngoscopy/nasopharyngoscopy: Active gag reflex.  Unable to perform.     Neuro/Psychiatric: AOx3.  Normal mood and affect.   Cardiovascular: Normal carotid pulses bilaterally, no increasing jugular venous distention noted at cervical region bilaterally.    Respiratory: Normal respiratory effort, no stridor, no retractions noted.      Assessment:         ICD-10-CM ICD-9-CM    1. Chronic eczematous otitis externa of both ears  H60.8X3 380.23       2. Blood in ear canal, left  H92.22 388.69             Plan:      -otoscopic exam revealed no cerumen impaction    -I instructed the patient to avoid Qtips.   Fluocinolone Oil (DermOtic) drops may have been prescribed and can be used daily or weekly as needed for itching.    Only use prednisone drops for flare ups and no more than 7-10 days.  -start on ofloxacin in the left ear bid x 10 days.   -start on cetirizine 10 mg daily  -instructed patient to not wear ear buds and try headphone instead  -follow up if symptoms do not improve      Viktoria Sharp NP    Answers submitted by the patient for this visit:  Review of Symptoms Questionnaire  (Submitted on 3/27/2025)  None of these: Yes  ear discharge: Yes  None of these : Yes  None of these: Yes  None of these : Yes  None of these: Yes  None of these: Yes  back pain: Yes  None of these : Yes  None of these: Yes  None of these : Yes  None of these: Yes  None of these: Yes  None of these: Yes         [1]   Social History  Socioeconomic History    Marital status:    Tobacco Use    Smoking status: Never    Smokeless tobacco: Never   Substance and Sexual Activity    Alcohol use: No    Drug use: No    Sexual activity: Not Currently     Social Drivers of Health     Financial Resource Strain: Low Risk  (9/5/2024)    Overall Financial Resource Strain (CARDIA)     Difficulty of Paying Living Expenses: Not very hard   Food Insecurity: No Food Insecurity (9/5/2024)    Hunger Vital Sign     Worried About Running Out of Food in the Last Year: Never true     Ran Out of Food in the Last Year: Never true   Transportation Needs: No Transportation Needs (8/28/2024)    TRANSPORTATION NEEDS     Transportation : No   Physical Activity: Inactive (9/5/2024)    Exercise Vital Sign     Days of Exercise per Week: 0 days     Minutes of Exercise per Session: 0 min   Stress: No Stress Concern Present (9/5/2024)    [employer] [place] [employer] - Occupational Stress Questionnaire     Feeling of Stress : Not at all   Housing  Stability: Unknown (9/5/2024)    Housing Stability Vital Sign     Unable to Pay for Housing in the Last Year: No     Homeless in the Last Year: No

## 2025-04-01 ENCOUNTER — OFFICE VISIT (OUTPATIENT)
Dept: SURGERY | Facility: CLINIC | Age: 75
End: 2025-04-01
Payer: MEDICARE

## 2025-04-01 VITALS
OXYGEN SATURATION: 99 % | HEIGHT: 67 IN | WEIGHT: 161.94 LBS | BODY MASS INDEX: 25.42 KG/M2 | SYSTOLIC BLOOD PRESSURE: 158 MMHG | DIASTOLIC BLOOD PRESSURE: 76 MMHG | HEART RATE: 74 BPM

## 2025-04-01 DIAGNOSIS — R10.31 RIGHT GROIN PAIN: Primary | ICD-10-CM

## 2025-04-01 PROCEDURE — 4010F ACE/ARB THERAPY RXD/TAKEN: CPT | Mod: CPTII,S$GLB,, | Performed by: SURGERY

## 2025-04-01 PROCEDURE — 3078F DIAST BP <80 MM HG: CPT | Mod: CPTII,S$GLB,, | Performed by: SURGERY

## 2025-04-01 PROCEDURE — 3077F SYST BP >= 140 MM HG: CPT | Mod: CPTII,S$GLB,, | Performed by: SURGERY

## 2025-04-01 PROCEDURE — 1101F PT FALLS ASSESS-DOCD LE1/YR: CPT | Mod: CPTII,S$GLB,, | Performed by: SURGERY

## 2025-04-01 PROCEDURE — 99203 OFFICE O/P NEW LOW 30 MIN: CPT | Mod: S$GLB,,, | Performed by: SURGERY

## 2025-04-01 PROCEDURE — 1126F AMNT PAIN NOTED NONE PRSNT: CPT | Mod: CPTII,S$GLB,, | Performed by: SURGERY

## 2025-04-01 PROCEDURE — 3008F BODY MASS INDEX DOCD: CPT | Mod: CPTII,S$GLB,, | Performed by: SURGERY

## 2025-04-01 PROCEDURE — 99999 PR PBB SHADOW E&M-EST. PATIENT-LVL IV: CPT | Mod: PBBFAC,,, | Performed by: SURGERY

## 2025-04-01 PROCEDURE — 3288F FALL RISK ASSESSMENT DOCD: CPT | Mod: CPTII,S$GLB,, | Performed by: SURGERY

## 2025-04-01 RX ORDER — CYANOCOBALAMIN 1000 UG/ML
INJECTION, SOLUTION INTRAMUSCULAR; SUBCUTANEOUS
COMMUNITY
Start: 2025-03-06

## 2025-04-01 RX ORDER — EPLERENONE 50 MG/1
TABLET ORAL
COMMUNITY
Start: 2025-03-21

## 2025-04-01 RX ORDER — TAMSULOSIN HYDROCHLORIDE 0.4 MG/1
CAPSULE ORAL
COMMUNITY
Start: 2025-02-06

## 2025-04-01 RX ORDER — HYDROCODONE BITARTRATE AND ACETAMINOPHEN 7.5; 325 MG/1; MG/1
1 TABLET ORAL
COMMUNITY
Start: 2025-03-24

## 2025-04-01 NOTE — PROGRESS NOTES
History & Physical  General Surgery    Subjective     History of Present Illness:  Patient is a 74 y.o. male presents for evaluation of right groin pain.  Has a history of open right inguinal hernia repair twice and a laparoscopic left inguinal hernia repair.  Recently became with right groin pain.  Associated with bending over when he is wearing his back brace.  Has not noticed any bulge or lump in the area.  No nausea, vomiting, constipation, or other obstructive symptoms.  No blood thinners.    Chief Complaint   Patient presents with    Consult       Review of patient's allergies indicates:  No Known Allergies    Current Medications[1]    Past Medical History:   Diagnosis Date    Cataract     Diabetes mellitus     Diabetic retinopathy     Hypertension      Past Surgical History:   Procedure Laterality Date    CATARACT EXTRACTION Right 03/27/2022    Dr. Ricketts    DESTRUCTION, INTRAOSSEOUS BASIVERTEBRAL NERVE, 1ST TWO BODIES, LUM/SAC N/A 2/19/2024    Procedure: DESTRUCTION,INTRAOSSEOUS BASIVERTEBRAL NERVE,1ST TWO BODIES,LUM/SAC;  Surgeon: Tomeka Lowry MD;  Location: [facility] OR;  Service: Pain Management;  Laterality: N/A;    EPIDURAL STEROID INJECTION INTO LUMBAR SPINE N/A 12/1/2023    Procedure: L5-S1 LESI;  Surgeon: Tomeka Lowry MD;  Location: [facility] PAIN MANAGEMENT;  Service: Pain Management;  Laterality: N/A;  oral 20 mins    EPIDURAL STEROID INJECTION INTO LUMBAR SPINE Bilateral 1/2/2024    Procedure: B/L L5-S1 TFESI;  Surgeon: Tyree Ochoa DO;  Location: [facility] PAIN MANAGEMENT;  Service: Pain Management;  Laterality: Bilateral;  20 mins    EPIDURAL STEROID INJECTION INTO LUMBAR SPINE Bilateral 3/15/2024    Procedure: B/L L4-5 TFESI;  Surgeon: Tomeka Lowry MD;  Location: [facility] PAIN MANAGEMENT;  Service: Pain Management;  Laterality: Bilateral;  20 mins    EPIDURAL STEROID INJECTION INTO LUMBAR SPINE N/A 11/15/2024    Procedure: L5-S1 (AIM LEFT);  Surgeon: Tomeka Lowry MD;  Location: [facility] PAIN  MANAGEMENT;  Service: Pain Management;  Laterality: N/A;  20 mins  no AC    FUSION, SPINE, LUMBAR, TLIF, POSTERIOR APPROACH, USING PEDICLE SCREW N/A 8/27/2024    Procedure: FUSION, SPINE, LUMBAR, TLIF, POSTERIOR APPROACH, USING PEDICLE SCREW L4-5 (L);  Surgeon: Suresh Yeh MD;  Location: [facility] OR 2ND FLR;  Service: Neurosurgery;  Laterality: N/A;    INTRAOCULAR PROSTHESES INSERTION Right 3/27/2022    Procedure: INSERTION, IOL PROSTHESIS;  Surgeon: Divine Ricketts MD;  Location: [facility] OR 1ST FLR;  Service: Ophthalmology;  Laterality: Right;    INTRAOCULAR PROSTHESES INSERTION Left 5/15/2022    Procedure: INSERTION, IOL PROSTHESIS;  Surgeon: Divine Ricketts MD;  [facility];  Service: Ophthalmology;  Laterality: Left;    PHACOEMULSIFICATION OF CATARACT Right 3/27/2022    Procedure: PHACOEMULSIFICATION, CATARACT;  Surgeon: Divine Ricketts MD;  [facility];  Service: Ophthalmology;  Laterality: Right;    PHACOEMULSIFICATION OF CATARACT Left 5/15/2022    Procedure: PHACOEMULSIFICATION, CATARACT;  Surgeon: Divine Ricketts MD;  [facility];  Service: Ophthalmology;  Laterality: Left;    PIRIFORMIS BLOCK Left 12/20/2024    Procedure: LT Piriformis Inj;  Surgeon: Tomeka Lowry MD;  Location: [facility] PAIN MANAGEMENT;  Service: Pain Management;  Laterality: Left;  no sed-no ac     Family History   Problem Relation Name Age of Onset    No Known Problems Mother      No Known Problems Father      Melanoma Neg Hx       Social History[2]     Review of Systems:  Review of Systems   Constitutional:  Negative for chills and fever.   Respiratory:  Negative for cough and shortness of breath.    Cardiovascular:  Negative for chest pain and palpitations.   Gastrointestinal:  Positive for abdominal pain (Right groin pain). Negative for nausea and vomiting.   Genitourinary:  Negative for dysuria and hematuria.   Musculoskeletal:  Negative for back pain and gait problem.   Skin:  Negative for  "color change, rash and wound.   Neurological:  Negative for weakness and headaches.   Hematological:  Negative for adenopathy. Does not bruise/bleed easily.   Psychiatric/Behavioral:  Negative for agitation and confusion.           Objective     Vital Signs (Most Recent)  Pulse: 74 (04/01/25 1359)  BP: (!) 158/76 (04/01/25 1359)  SpO2: 99 % (04/01/25 1359)  5' 7" (1.702 m)  73.5 kg (161 lb 14.9 oz)     Physical Exam:  Physical Exam  Constitutional:       General: He is not in acute distress.     Appearance: Normal appearance. He is not ill-appearing.   HENT:      Head: Normocephalic and atraumatic.   Eyes:      General: No scleral icterus.     Pupils: Pupils are equal, round, and reactive to light.   Neck:      Trachea: No tracheal deviation.   Cardiovascular:      Rate and Rhythm: Normal rate and regular rhythm.   Pulmonary:      Effort: Pulmonary effort is normal. No respiratory distress.      Breath sounds: No stridor.   Abdominal:      General: There is no distension.      Palpations: Abdomen is soft.      Tenderness: There is no abdominal tenderness.      Hernia: No hernia is present.   Musculoskeletal:         General: No deformity or signs of injury.      Cervical back: No edema or erythema.   Skin:     General: Skin is warm and dry.      Coloration: Skin is not jaundiced.   Neurological:      General: No focal deficit present.      Mental Status: He is alert and oriented to person, place, and time.   Psychiatric:         Mood and Affect: Mood normal.         Behavior: Behavior normal.            Assessment and Plan   75-year-old man with history of multiple right inguinal hernia repairs presents with new right groin pain.  No obvious hernia on exam or symptomatology.    PLAN:    We will obtain provocative hernia ultrasound to evaluate for hernia recurrence.  Plan for robotic repair if recurrence is present.  If no hernia present, we will refer for pain management injections.  Patient voiced understanding of " this plan.    Faheem Kaur MD  Acute Care Surgery  [facility] Department of Surgery                [1]   Current Outpatient Medications   Medication Sig Dispense Refill    amitriptyline (ELAVIL) 10 MG tablet TAKE 1 TABLET BY MOUTH ONCE DAILY AT NIGHT AT BEDTIME      atorvastatin (LIPITOR) 40 MG tablet Take 40 mg by mouth every evening.      cetirizine (ZYRTEC) 10 MG tablet Take 1 tablet (10 mg total) by mouth once daily. 30 tablet 11    cyanocobalamin 1,000 mcg/mL injection SMARTSI Milliliter(s) IM Twice Monthly      diltiaZEM (CARDIZEM LA) 180 mg 24 hr tablet Take 180 mg by mouth once daily.      eplerenone (INSPRA) 50 MG Tab       fluocinolone acetonide oiL (DERMOTIC OIL) 0.01 % Drop Place 3 drops in ear(s) 2 (two) times daily. Place 3 drops in both ears twice a day for 7 days 20 mL 1    GLUCAGON EMERGENCY KIT, HUMAN, 1 mg injection Inject 1 mL into the muscle.      HUMALOG KWIKPEN INSULIN 100 unit/mL pen Inject into the skin.      hydroCHLOROthiazide (HYDRODIURIL) 12.5 MG Tab Take 12.5 mg by mouth once daily.      HYDROcodone-acetaminophen (NORCO) 7.5-325 mg per tablet Take 1 tablet by mouth.      insulin glargine (LANTUS) 100 unit/mL injection Inject 25 Units into the skin once daily.      irbesartan (AVAPRO) 75 MG tablet Take 75 mg by mouth every evening.      ofloxacin (FLOXIN) 0.3 % otic solution Place 3 drops into the left ear 2 (two) times daily. for 10 days 5 mL 0    oxyCODONE (ROXICODONE) 10 mg Tab immediate release tablet Take 1 tablet (10 mg total) by mouth every 6 (six) hours as needed for Pain. 28 tablet 0    oxyCODONE-acetaminophen (PERCOCET)  mg per tablet Take 1 tablet by mouth every 8 (eight) hours as needed for Pain. 21 tablet 0    tamsulosin (FLOMAX) 0.4 mg Cap TAKE 1 CAPSULE BY MOUTH EVERY DAY 30 MIN FOLLOWING THE SAME MEAL EACH DAY      amLODIPine (NORVASC) 10 MG tablet Take 10 mg by mouth.      celecoxib (CELEBREX) 200 MG capsule Take 1 capsule (200 mg total) by mouth once daily. 30  capsule 0    docusate sodium (COLACE) 100 MG capsule Take 1 capsule (100 mg total) by mouth 2 (two) times daily as needed for Constipation. 30 capsule 0    ondansetron (ZOFRAN-ODT) 4 MG TbDL Dissolve 2 tablets (8 mg total) by mouth every 8 (eight) hours as needed. 30 tablet 0    pregabalin (LYRICA) 50 MG capsule Take 1 capsule (50 mg total) by mouth 3 (three) times daily. 90 capsule 2    sulfamethoxazole-trimethoprim 800-160mg (BACTRIM DS) 800-160 mg Tab Take 1 tablet by mouth 2 (two) times daily. 14 tablet 0     No current facility-administered medications for this visit.     Facility-Administered Medications Ordered in Other Visits   Medication Dose Route Frequency Provider Last Rate Last Admin    phenylephrine HCL 2.5% ophthalmic solution 1 drop  1 drop Right Eye On Call Procedure Divine Ricketts MD   1 drop at 03/27/22 0813    phenylephrine HCL 2.5% ophthalmic solution 1 drop  1 drop Left Eye On Call Procedure Divine Ricketts MD   1 drop at 05/15/22 0948    proparacaine 0.5 % ophthalmic solution 1 drop  1 drop Right Eye On Call Procedure Divine Ricketts MD   1 drop at 03/27/22 0802    proparacaine 0.5 % ophthalmic solution 1 drop  1 drop Left Eye On Call Procedure Divine Ricketts MD   1 drop at 05/15/22 0936    tropicamide 1% ophthalmic solution 1 drop  1 drop Right Eye On Call Procedure Divine Ricketts MD   1 drop at 03/27/22 0813    tropicamide 1% ophthalmic solution 1 drop  1 drop Left Eye On Call Procedure Divine Ricketts MD   1 drop at 05/15/22 0947   [2]   Social History  Tobacco Use    Smoking status: Never    Smokeless tobacco: Never   Substance Use Topics    Alcohol use: No    Drug use: No

## 2025-04-02 ENCOUNTER — RESULTS FOLLOW-UP (OUTPATIENT)
Dept: SURGERY | Facility: HOSPITAL | Age: 75
End: 2025-04-02

## 2025-04-02 ENCOUNTER — TELEPHONE (OUTPATIENT)
Dept: SURGERY | Facility: CLINIC | Age: 75
End: 2025-04-02
Payer: MEDICARE

## 2025-04-02 DIAGNOSIS — G57.80 NEUROPATHY, ILIOINGUINAL NERVE, UNSPECIFIED LATERALITY: Primary | ICD-10-CM

## 2025-04-02 NOTE — TELEPHONE ENCOUNTER
Pt notified. Pain Management Referral placed. Pt voiced understanding. Requesting to cont Pain Mangement where already established at [facility] .

## 2025-04-02 NOTE — TELEPHONE ENCOUNTER
----- Message from Faheem Kaur MD sent at 4/2/2025  2:50 PM CDT -----  No hernia seen. Please refer to pain management for ilioinguinal nerve ablation.  ----- Message -----  From: Interface, Rad Results In  Sent: 4/2/2025   8:40 AM CDT  To: Faheem Kaur MD

## 2025-05-06 ENCOUNTER — OFFICE VISIT (OUTPATIENT)
Dept: PAIN MEDICINE | Facility: CLINIC | Age: 75
End: 2025-05-06
Payer: MEDICARE

## 2025-05-06 VITALS
SYSTOLIC BLOOD PRESSURE: 168 MMHG | HEIGHT: 67 IN | WEIGHT: 161.63 LBS | BODY MASS INDEX: 25.37 KG/M2 | HEART RATE: 61 BPM | DIASTOLIC BLOOD PRESSURE: 76 MMHG

## 2025-05-06 DIAGNOSIS — M51.362 DEGENERATION OF INTERVERTEBRAL DISC OF LUMBAR REGION WITH DISCOGENIC BACK PAIN AND LOWER EXTREMITY PAIN: ICD-10-CM

## 2025-05-06 DIAGNOSIS — M79.18 MYOFASCIAL PAIN SYNDROME: ICD-10-CM

## 2025-05-06 DIAGNOSIS — R10.31 RIGHT LOWER QUADRANT ABDOMINAL PAIN: Primary | ICD-10-CM

## 2025-05-06 DIAGNOSIS — G57.81: ICD-10-CM

## 2025-05-06 DIAGNOSIS — M96.1 POSTLAMINECTOMY SYNDROME OF LUMBAR REGION: ICD-10-CM

## 2025-05-06 PROCEDURE — 1101F PT FALLS ASSESS-DOCD LE1/YR: CPT | Mod: CPTII,S$GLB,, | Performed by: STUDENT IN AN ORGANIZED HEALTH CARE EDUCATION/TRAINING PROGRAM

## 2025-05-06 PROCEDURE — 3288F FALL RISK ASSESSMENT DOCD: CPT | Mod: CPTII,S$GLB,, | Performed by: STUDENT IN AN ORGANIZED HEALTH CARE EDUCATION/TRAINING PROGRAM

## 2025-05-06 PROCEDURE — 3008F BODY MASS INDEX DOCD: CPT | Mod: CPTII,S$GLB,, | Performed by: STUDENT IN AN ORGANIZED HEALTH CARE EDUCATION/TRAINING PROGRAM

## 2025-05-06 PROCEDURE — 99214 OFFICE O/P EST MOD 30 MIN: CPT | Mod: S$GLB,,, | Performed by: STUDENT IN AN ORGANIZED HEALTH CARE EDUCATION/TRAINING PROGRAM

## 2025-05-06 PROCEDURE — 3078F DIAST BP <80 MM HG: CPT | Mod: CPTII,S$GLB,, | Performed by: STUDENT IN AN ORGANIZED HEALTH CARE EDUCATION/TRAINING PROGRAM

## 2025-05-06 PROCEDURE — G2211 COMPLEX E/M VISIT ADD ON: HCPCS | Mod: S$GLB,,, | Performed by: STUDENT IN AN ORGANIZED HEALTH CARE EDUCATION/TRAINING PROGRAM

## 2025-05-06 PROCEDURE — 1125F AMNT PAIN NOTED PAIN PRSNT: CPT | Mod: CPTII,S$GLB,, | Performed by: STUDENT IN AN ORGANIZED HEALTH CARE EDUCATION/TRAINING PROGRAM

## 2025-05-06 PROCEDURE — 99999 PR PBB SHADOW E&M-EST. PATIENT-LVL IV: CPT | Mod: PBBFAC,,, | Performed by: STUDENT IN AN ORGANIZED HEALTH CARE EDUCATION/TRAINING PROGRAM

## 2025-05-06 PROCEDURE — 4010F ACE/ARB THERAPY RXD/TAKEN: CPT | Mod: CPTII,S$GLB,, | Performed by: STUDENT IN AN ORGANIZED HEALTH CARE EDUCATION/TRAINING PROGRAM

## 2025-05-06 PROCEDURE — 1159F MED LIST DOCD IN RCRD: CPT | Mod: CPTII,S$GLB,, | Performed by: STUDENT IN AN ORGANIZED HEALTH CARE EDUCATION/TRAINING PROGRAM

## 2025-05-06 PROCEDURE — 3077F SYST BP >= 140 MM HG: CPT | Mod: CPTII,S$GLB,, | Performed by: STUDENT IN AN ORGANIZED HEALTH CARE EDUCATION/TRAINING PROGRAM

## 2025-05-06 PROCEDURE — 1160F RVW MEDS BY RX/DR IN RCRD: CPT | Mod: CPTII,S$GLB,, | Performed by: STUDENT IN AN ORGANIZED HEALTH CARE EDUCATION/TRAINING PROGRAM

## 2025-05-06 NOTE — PROGRESS NOTES
Chronic Pain - Established Clinic Visit    Referring Physician: No ref. provider found    Chief Complaint:   Chief Complaint   Patient presents with    Abdominal Pain        SUBJECTIVE:  INTERVAL HISTORY 5/6/2025:  Mr. Solomon returns for abdominal pain. Current Pain level is 2/10.  He reports he had multiple hernia surgeries, but with new exercises he started in physical therapy, he had recurrence of abdominal pain.  He saw his general surgeon, and was referred to for an ultrasound of his abdomen.  He was not found to have recurrent hernia, but he was referred to us for management of his abdominal pain.  He reports regarding his back, he feels his pain has been much better.  He feels that his pain bothers him in the morning, but as he does activity, his pain improves.    INTERVAL HISTORY 2/11/2025:  Mr. Solomon returns for back and left leg pain. Current Pain level is 7/10.  He did report some relief from his left piriformis injection (helped with lateral leg pain), but he reports a lingering left foot pain. He does report that his blood sugar tends to spike with the steroid injections. He does report he continues to work with physical therapy ([facility]), but he requires a pain medication to participate in physical therapy.  He continues to have pain with extending his back or twisting.  He does report he uses a lumbar corset which helps with the pain.    INTERVAL HISTORY 12/10/2024:  Mr. Solomon returns for left leg pain. Current Pain level is 6-8/10.  He reports that he had about 3 weeks of significant relief after his back surgery.  However, after his wife had an accident, he had to walk over 7000 steps.  He reports that afterwards, he developed recurrence of his pain. He had follow up imaging which was negative for any abnormalities. He has tried an epidural injection and he has tried physical therapy as well.    INTERVAL HISTORY 3/5/2024:  Mr. Solomon returns for bilateral leg pain. Current Pain level is  8/10.  He is s/p L4 and L5 intracept procedure. He feels his lower back pain has improved considerably, however his bilateral leg pain has worsened. Since his last visit, he has seen Dr. Yeh (orthopedic surgery) and was suggested to consider L4/5 PLIF.  Mr. Solomon's primary complaint at this time is his radiating bilateral lower extremity pain.    INTERVAL HISTORY 1/23/2023:  Mr. Solomon returns for chronic lower back pain and bilateral leg pain.  He is s/p bilateral L5/S1 transforaminal epidural steroid injection. He reports that the pain that was radiating down his legs has improved for his bilateral leg pain.  He reports the bilateral leg pain and tingling is 4-5/10.  Prior to the injection, the pain was 10/10. Now, he feels the pain is primarily in the back.  He feels the pain is worst when he is getting out of bed.  He still feels some numbness to the right leg.  He reports his pain gets worse when he moves around his legs.  He reports today his pain level is 0/10 as he took ibuprofen and tylenol prior to coming in to clinic.    INTERVAL HISTORY 12/13/2023:  Mr. Solomon presents for lower back and bilateral leg pain. Current Pain level is 5/10.  He is s/p L5/S1 interlaminar epidural steroid injection with no significant improvement in his pain. He reports that he initially had minimal relief of his left leg, however the pain returned after the weekend. He reports that his pain is 10/10 in the morning and feels better with flexion and taking Ibuprofen.  Coughing/sneezing makes his pain worse.  He continues to try home exercises as tolerated with limited relief in his pain.  He brought in his MRI report and disc from his outside facility which again demonstrates multilevel disc degenerative changes with multilevel foraminal stenosis.     Interval History 11/16/2023:  73-year-old male presents today for a follow-up appointment to discuss his lumbar MRI and be considered for pain interventions.  His pain continues  to complain of  low back with radiating symptoms into his legs posteriorly slightly pass his knees bilaterally.  He reports pulling and tightening symptoms in his legs bilaterally walking, standing and repositioning his body increases his pain.   He denies any profound weakness denies any recent incident or trauma denies any bowel bladder dysfunction, denies any saddle anesthesia at this time.    David Solomon presents to the clinic for the evaluation of lower back pain. The pain started 6 weeks ago following lawn work and symptoms have been worsening.The pain is located in the lower back area and radiates to the hamstrings and down to his calves.  The pain is described as stabbing and is rated as 7/10. The pain is rated with a score of  0/10 on the BEST day and a score of 9/10 on the WORST day.  Symptoms interfere with daily activity. The pain is exacerbated by certain positions, walking, worse in the morning (getting out of bed).  The pain is mitigated by medications (ibuprofen and steroid (one dose).  The patient reports 6 hours of uninterrupted sleep per night.    He has tried biofreeze without relief.  He was given a medrol dose pack, but his blood glucose went extremely high, so he stopped it. He also had a steroid injection at an ED which also severely increased his blood sugar.  He sees his endocrinologist for over 20 years and is currently on a long acting and short acting regimen.  He checks his sugars 10 times a day.    He was seen by Dr. Gonsalves on 5/25/2020 for knee pain and was given some knee rehab exercises.  He continues to participate in [employer] fitness.    Patient denies urinary incontinence, bowel incontinence, and significant motor weakness.    Physical Therapy/Home Exercise: no      Pain Disability Index Review:      5/6/2025    11:10 AM 2/11/2025     1:25 PM 12/10/2024     1:16 PM   Last 3 PDI Scores   Pain Disability Index (PDI) 21 49 63       Pain Medications:  Norco 7.5-325mg  Ibuprofen  800mg twice a day     report:  Reviewed and consistent with medication use as prescribed.      Pain Procedures:   12/1/2023 - L5/S1 LESI  2/19/2024 - B/L L5/S1 TFESI  3/15/2024 - B/L L4/5 TFESI  11/15/2024 - L5/S1 LESI (left directed)    Imaging:   MRI LUMBAR SPINE W WO CONTRAST     CLINICAL HISTORY:  Low back pain, prior surgery, new symptoms; Dorsalgia, unspecified     TECHNIQUE:  Multiplanar, multisequence MR images were acquired from the thoracolumbar junction to the sacrum without the administration of contrast.Additional post contrast images after 8 cc Gadovist     COMPARISON:  4/5/2024and 10/26/2023     FINDINGS:  Posterior instrumented fusion L4-5 with intervertebral disc spacer.  L4 laminectomy.     Alignment: Normal.     Vertebrae: 5 lumbar-type vertebral bodies. No aggressive marrow replacement process or fracture.     Discs: Multilevel disc dessication.     Cord: Normal. Conus terminates at T12-L1.     Degenerative findings:     T12-L1: There is no focal disc herniation. No significant central canal narrowing . No significant neural foraminal narrowing.     L1-L2: There is no focal disc herniation. No significant central canal narrowing . No significant neural foraminal narrowing.     L2-L3: Broad based mild diffuse bulging of disc material .  Mild central canal narrowing . No significant neural foraminal narrowing.     L3-L4: Broad-based disc bulge, facet degenerative change and ligamentum flavum thickening which contribute to moderate central canal narrowing . Mild bilateral neural foraminal narrowing.     L4-L5: There is no focal disc herniation. No significant central canal narrowing . No significant neural foraminal narrowing.     L5-S1: There is no focal disc herniation. No significant central canal narrowing . No significant neural foraminal narrowing.     Paraspinal muscles & soft tissues: There is a subcutaneous postoperative collection measuring 1.9 x 9.0 x 6.0 cm extending from L3 through  L5 levels.  This demonstrates continuity with laminectomy defect as noted on sagittal image 8 axial image 36/37 with fluid extending into the laminectomy bed.  There is no evidence for compression upon the thecal sac or communication with the thecal sac.     Postcontrast images demonstrate postoperative collection with minimal peripheral enhancement, most consistent with postoperative change inclusive of seroma/hematoma with abscess felt to be unlikely.  No abnormal enhancing intrathecal lesions otherwise noted.     Impression:     S/P posterior instrumented fusion L4-L5 with intervertebral disc spacer.  L4 laminectomy.  Postoperative collection as detailed above without evidence for mass effect upon the thecal sac or definite communication with the thecal sac.     Lumbar spondylosis L2-L3 and L3-L4 as above.        Electronically signed by:Robby Hubbard MD  Date:                                            11/03/2024  Time:                                           12:22    XR KNEE ORTHO LEFT WITH FLEXION     CLINICAL HISTORY:  . Pain in left knee     TECHNIQUE:  AP standing view of both knees, PA flexion standing views of both knees, and Merchant views of both knees were performed. A lateral view of the left knee was also performed.     COMPARISON:  Radiographs dating back 2019 under 2 cm size of subcortical remote cystic change medial left tibial plateau epiphyseal plate, minimal mild medial posterior patellar articular facet DJD.     FINDINGS:  Under 2 cm size subcortical cystic remote change medial left tibial plateau epiphyseal plate stable.  Minimal mild posteromedial patella articular facet DJD stable.     Impression:     No new fracture dislocation or joint effusions.        Electronically signed by:Naeem Ortiz MD  Date:                                            11/05/2024  Time:                                           11:45      XR TIBIA FIBULA 2 VIEW LEFT     CLINICAL HISTORY:  Pain in left  leg     TECHNIQUE:  AP and lateral views of the left tibia and fibula were performed.     COMPARISON:  Knee radiographs and report same day.     FINDINGS:  Focal subcortical remote cystic change medial tibial plateau epiphyseal plate.  Bone, joint and soft tissues otherwise normal.  Calcified plaque portions of popliteal and popliteal trifurcation, tibial arteries including dorsalis pedis artery.     Impression:     No fracture dislocation.  Additional findings above.        Electronically signed by:Naeem Otriz MD  Date:                                            11/05/2024  Time:                                           11:48    XR LUMBAR SPINE AP AND LATERAL     CLINICAL HISTORY:  Other intervertebral disc degeneration, lumbar region without mention of lumbar back pain or lower extremity pain     TECHNIQUE:  AP, lateral and spot images were performed of the lumbar spine.     COMPARISON:  09/24/2024     FINDINGS:  Postoperative changes L4-5 posterior fusion with interbody spacer.  Hardware and alignment appears stable.  There is mild scoliosis with straightening of the normal lumbar lordosis.  There is intervertebral disc space narrowing and marginal osteophyte formation at the non operative levels.  No acute fracture or osseous destructive process.  Sacroiliac joints are symmetric.  Postoperative changes of prior lower abdominal hernia repair.     Impression:     Stable postoperative changes lumbar spine.        Electronically signed by:Sanjeev Thorne MD  Date:                                            10/24/2024  Time:                                           11:59    Past Medical History:   Diagnosis Date    Cataract     Diabetes mellitus     Diabetic retinopathy     Hypertension      Past Surgical History:   Procedure Laterality Date    CATARACT EXTRACTION Right 03/27/2022    Dr. Ricketts    DESTRUCTION, INTRAOSSEOUS BASIVERTEBRAL NERVE, 1ST TWO BODIES, LUM/SAC N/A 2/19/2024    Procedure:  DESTRUCTION,INTRAOSSEOUS BASIVERTEBRAL NERVE,1ST TWO BODIES,LUM/SAC;  Surgeon: Tomeka Lowry MD;  Location: [facility] OR;  Service: Pain Management;  Laterality: N/A;    EPIDURAL STEROID INJECTION INTO LUMBAR SPINE N/A 12/1/2023    Procedure: L5-S1 LESI;  Surgeon: Tomeka Lowry MD;  Location: [facility] PAIN MANAGEMENT;  Service: Pain Management;  Laterality: N/A;  oral 20 mins    EPIDURAL STEROID INJECTION INTO LUMBAR SPINE Bilateral 1/2/2024    Procedure: B/L L5-S1 TFESI;  Surgeon: Tyree Ochoa DO;  Location: [facility] PAIN MANAGEMENT;  Service: Pain Management;  Laterality: Bilateral;  20 mins    EPIDURAL STEROID INJECTION INTO LUMBAR SPINE Bilateral 3/15/2024    Procedure: B/L L4-5 TFESI;  Surgeon: Tomeka Lowry MD;  Location: [facility] PAIN MANAGEMENT;  Service: Pain Management;  Laterality: Bilateral;  20 mins    EPIDURAL STEROID INJECTION INTO LUMBAR SPINE N/A 11/15/2024    Procedure: L5-S1 (AIM LEFT);  Surgeon: Tomeka Lowry MD;  Location: [facility] PAIN MANAGEMENT;  Service: Pain Management;  Laterality: N/A;  20 mins  no AC    FUSION, SPINE, LUMBAR, TLIF, POSTERIOR APPROACH, USING PEDICLE SCREW N/A 8/27/2024    Procedure: FUSION, SPINE, LUMBAR, TLIF, POSTERIOR APPROACH, USING PEDICLE SCREW L4-5 (L);  Surgeon: Suresh Yeh MD;  Location: [facility] OR 2ND FLR;  Service: Neurosurgery;  Laterality: N/A;    INTRAOCULAR PROSTHESES INSERTION Right 3/27/2022    Procedure: INSERTION, IOL PROSTHESIS;  Surgeon: Divine Ricketts MD;  Location: [facility] OR 1ST FLR;  Service: Ophthalmology;  Laterality: Right;    INTRAOCULAR PROSTHESES INSERTION Left 5/15/2022    Procedure: INSERTION, IOL PROSTHESIS;  Surgeon: Divine Ricketts MD;  Location: [facility] OR 1ST FLR;  Service: Ophthalmology;  Laterality: Left;    PHACOEMULSIFICATION OF CATARACT Right 3/27/2022    Procedure: PHACOEMULSIFICATION, CATARACT;  Surgeon: Divine Ricketts MD;  Location: [facility] OR 1ST FLR;  Service: Ophthalmology;  Laterality: Right;    PHACOEMULSIFICATION OF CATARACT Left  5/15/2022    Procedure: PHACOEMULSIFICATION, CATARACT;  Surgeon: Divine Ricketts MD;  Location: [address];  Service: Ophthalmology;  Laterality: Left;    PIRIFORMIS BLOCK Left 12/20/2024    Procedure: LT Piriformis Inj;  Surgeon: Tomeka Lowry MD;  Location: [facility] PAIN MANAGEMENT;  Service: Pain Management;  Laterality: Left;  no sed-no ac     Social History     Socioeconomic History    Marital status:    Tobacco Use    Smoking status: Never    Smokeless tobacco: Never   Substance and Sexual Activity    Alcohol use: No    Drug use: No    Sexual activity: Not Currently     Social Drivers of Health     Financial Resource Strain: Low Risk  (3/31/2025)    Overall Financial Resource Strain (CARDIA)     Difficulty of Paying Living Expenses: Not hard at all   Food Insecurity: No Food Insecurity (3/31/2025)    Hunger Vital Sign     Worried About Running Out of Food in the Last Year: Never true     Ran Out of Food in the Last Year: Never true   Transportation Needs: No Transportation Needs (3/31/2025)    PRAPARE - Transportation     Lack of Transportation (Medical): No     Lack of Transportation (Non-Medical): No   Physical Activity: Inactive (3/31/2025)    Exercise Vital Sign     Days of Exercise per Week: 0 days     Minutes of Exercise per Session: 0 min   Stress: No Stress Concern Present (3/31/2025)    [employer] [place] [employer] - Occupational Stress Questionnaire     Feeling of Stress : Only a little   Housing Stability: Low Risk  (3/31/2025)    Housing Stability Vital Sign     Unable to Pay for Housing in the Last Year: No     Number of Times Moved in the Last Year: 0     Homeless in the Last Year: No     Family History   Problem Relation Name Age of Onset    No Known Problems Mother      No Known Problems Father      Melanoma Neg Hx         Review of patient's allergies indicates:  No Known Allergies    Current Outpatient Medications   Medication Sig    amitriptyline (ELAVIL) 10 MG tablet  TAKE 1 TABLET BY MOUTH ONCE DAILY AT NIGHT AT BEDTIME    atorvastatin (LIPITOR) 40 MG tablet Take 40 mg by mouth every evening.    cetirizine (ZYRTEC) 10 MG tablet Take 1 tablet (10 mg total) by mouth once daily.    cyanocobalamin 1,000 mcg/mL injection SMARTSI Milliliter(s) IM Twice Monthly    diltiaZEM (CARDIZEM LA) 180 mg 24 hr tablet Take 180 mg by mouth once daily.    eplerenone (INSPRA) 50 MG Tab     GLUCAGON EMERGENCY KIT, HUMAN, 1 mg injection Inject 1 mL into the muscle.    HUMALOG KWIKPEN INSULIN 100 unit/mL pen Inject into the skin.    hydroCHLOROthiazide (HYDRODIURIL) 12.5 MG Tab Take 12.5 mg by mouth once daily.    HYDROcodone-acetaminophen (NORCO) 7.5-325 mg per tablet Take 1 tablet by mouth.    insulin glargine (LANTUS) 100 unit/mL injection Inject 25 Units into the skin once daily.    irbesartan (AVAPRO) 75 MG tablet Take 75 mg by mouth every evening.    oxyCODONE (ROXICODONE) 10 mg Tab immediate release tablet Take 1 tablet (10 mg total) by mouth every 6 (six) hours as needed for Pain.    oxyCODONE-acetaminophen (PERCOCET)  mg per tablet Take 1 tablet by mouth every 8 (eight) hours as needed for Pain.    tamsulosin (FLOMAX) 0.4 mg Cap TAKE 1 CAPSULE BY MOUTH EVERY DAY 30 MIN FOLLOWING THE SAME MEAL EACH DAY    amLODIPine (NORVASC) 10 MG tablet Take 10 mg by mouth.    celecoxib (CELEBREX) 200 MG capsule Take 1 capsule (200 mg total) by mouth once daily.    docusate sodium (COLACE) 100 MG capsule Take 1 capsule (100 mg total) by mouth 2 (two) times daily as needed for Constipation.    ondansetron (ZOFRAN-ODT) 4 MG TbDL Dissolve 2 tablets (8 mg total) by mouth every 8 (eight) hours as needed.    pregabalin (LYRICA) 50 MG capsule Take 1 capsule (50 mg total) by mouth 3 (three) times daily.    sulfamethoxazole-trimethoprim 800-160mg (BACTRIM DS) 800-160 mg Tab Take 1 tablet by mouth 2 (two) times daily.     No current facility-administered medications for this visit.     Facility-Administered  "Medications Ordered in Other Visits   Medication    phenylephrine HCL 2.5% ophthalmic solution 1 drop    phenylephrine HCL 2.5% ophthalmic solution 1 drop    proparacaine 0.5 % ophthalmic solution 1 drop    proparacaine 0.5 % ophthalmic solution 1 drop    tropicamide 1% ophthalmic solution 1 drop    tropicamide 1% ophthalmic solution 1 drop       REVIEW OF SYSTEMS:    GENERAL:  No weight loss, malaise or fevers.  HEENT:  Negative for frequent or significant headaches.  NECK:  Negative for lumps, goiter, pain and significant neck swelling.  RESPIRATORY:  Negative for cough, wheezing or shortness of breath.  CARDIOVASCULAR:  Negative for chest pain, leg swelling or palpitations.  GI:  Negative for abdominal discomfort, blood in stools or black stools or change in bowel habits.  MUSCULOSKELETAL:  See HPI.  SKIN:  Negative for lesions, rash, and itching.  PSYCH:  Negative for sleep disturbance, mood disorder and recent psychosocial stressors.  HEMATOLOGY/LYMPHOLOGY:  Negative for prolonged bleeding, bruising easily or swollen nodes. +DM type 1  NEURO:   No history of headaches, syncope, paralysis, seizures or tremors.  All other reviewed and negative other than HPI.    OBJECTIVE:    BP (!) 168/76 (BP Location: Left arm, Patient Position: Sitting)   Pulse 61   Ht 5' 7" (1.702 m)   Wt 73.3 kg (161 lb 9.6 oz)   BMI 25.31 kg/m²     PHYSICAL EXAMINATION:  General appearance: Well appearing, in no acute distress, alert and appropriately communicative.  Psych:  Mood and affect appropriate.  Skin: Skin color, texture, turgor normal, no rashes or lesions, in both upper and lower body.  Head/face:  Atraumatic, normocephalic.  Cor: regular rate  Pulm: non-labored breathing  GI: Abdomen non-distended and non-tender, with the exception of right lower quadrant slight tenderness to deep palpation  Back: SLR negative No pain to palpation over the spine or paraspinal muscles.   Extremities: Peripheral joint ROM is full and pain free " without obvious instability or laxity in all four extremities. No deformities, edema, or skin discoloration. Good capillary refill.  Musculoskeletal:  hip, sacroiliac and knee provocative maneuvers are negative. Bilateral upper and lower extremity strength is normal and symmetric.  No atrophy or tone abnormalities are noted.  Neuro: Bilateral upper and lower extremity coordination and muscle stretch reflexes are physiologic and symmetric.  Negative Clonus. No loss of sensation is noted.  Gait: Normal.    Lab Results   Component Value Date    CREATININE 1.2 08/28/2024     CMP  Sodium   Date Value Ref Range Status   08/28/2024 133 (L) 136 - 145 mmol/L Final     Potassium   Date Value Ref Range Status   08/28/2024 4.6 3.5 - 5.1 mmol/L Final     Chloride   Date Value Ref Range Status   08/28/2024 106 95 - 110 mmol/L Final     CO2   Date Value Ref Range Status   08/28/2024 20 (L) 23 - 29 mmol/L Final     Glucose   Date Value Ref Range Status   08/28/2024 192 (H) 70 - 110 mg/dL Final     BUN   Date Value Ref Range Status   08/28/2024 22 8 - 23 mg/dL Final     Creatinine   Date Value Ref Range Status   08/28/2024 1.2 0.5 - 1.4 mg/dL Final     Calcium   Date Value Ref Range Status   08/28/2024 8.7 8.7 - 10.5 mg/dL Final     Total Protein   Date Value Ref Range Status   08/28/2024 5.9 (L) 6.0 - 8.4 g/dL Final     Albumin   Date Value Ref Range Status   08/28/2024 3.2 (L) 3.5 - 5.2 g/dL Final     Total Bilirubin   Date Value Ref Range Status   08/28/2024 0.8 0.1 - 1.0 mg/dL Final     Comment:     For infants and newborns, interpretation of results should be based  on gestational age, weight and in agreement with clinical  observations.    Premature Infant recommended reference ranges:  Up to 24 hours.............<8.0 mg/dL  Up to 48 hours............<12.0 mg/dL  3-5 days..................<15.0 mg/dL  6-29 days.................<15.0 mg/dL       Alkaline Phosphatase   Date Value Ref Range Status   08/28/2024 73 55 - 135 U/L  Final     AST   Date Value Ref Range Status   08/28/2024 27 10 - 40 U/L Final     ALT   Date Value Ref Range Status   08/28/2024 20 10 - 44 U/L Final     Anion Gap   Date Value Ref Range Status   08/28/2024 7 (L) 8 - 16 mmol/L Final     eGFR   Date Value Ref Range Status   08/28/2024 >60.0 >60 mL/min/1.73 m^2 Final         ASSESSMENT: 75 y.o. year old male with lower back pain, consistent with:     1. Right lower quadrant abdominal pain        2. Myofascial pain syndrome        3. Degeneration of intervertebral disc of lumbar region with discogenic back pain and lower extremity pain        4. Postlaminectomy syndrome of lumbar region        5. Irritation of right ilioinguinal nerve            IMPRESSION: Mr. Solomon returns with lower back and bilateral lower extremity pain.  He is s/p L5/S1 interlaminar epidural injection with no improvement in his pain. History and physical exam are consistent with lumbar radiculopathy, specifically following the L5 distribution on the left. Imaging is consistent with lumbar degenerative disc disease with central and foraminal stenosis at L4/5 and L5/S1.  He has failed multiple interventions in the past, but his pain may be related to piriformis pain at this point.  We will offer another intervention, however if he fails to get relief, we may need to start discussions regarding spinal cord stimulation.    PLAN:   - I have stressed the importance of physical activity and a home exercise plan to help with pain and improve health.  - continue pregabalin 50mg BID  - at this point, he does not feel he has significant pain in his abdomen, as feels the pain is not constant  - we discussed the option of right (and or left) ilioinguinal nerve block if his pain persists.  - in the meantime, he feels that not doing specific exercises alleviates the pain. I instructed to discuss this with his therapist and to modify his exercise routine accordingly  - regarding his back, his symptoms seem to  be improving. We can hold off on spinal cord stimulation at this time.  - RTC as needed    The above plan and management options were discussed at length with patient. Patient is in agreement with the above and verbalized understanding. It will be communicated with the referring physician via electronic record, fax, or mail.  > 40 minutes were spent discussing all options including prior therapies.    Tomeka Lowry MD  Interventional Pain Management    05/06/2025

## 2025-05-07 ENCOUNTER — TELEPHONE (OUTPATIENT)
Dept: PODIATRY | Facility: CLINIC | Age: 75
End: 2025-05-07
Payer: MEDICARE

## 2025-05-07 NOTE — TELEPHONE ENCOUNTER
----- Message from Ankita sent at 5/7/2025  2:51 PM CDT -----  Symptom: Foot or Ankle Pain - Not From InjuryOutcome: Schedule an appointment to be seen within 24 hours.Reason: Caller denied all higher acuity questionsThe caller accepted this outcome.

## 2025-05-07 NOTE — TELEPHONE ENCOUNTER
Called and spoke to patient. Scheduled appointment with patient for ankle pain. Patient understood date, time, and location.

## 2025-05-09 ENCOUNTER — OFFICE VISIT (OUTPATIENT)
Dept: PODIATRY | Facility: CLINIC | Age: 75
End: 2025-05-09
Payer: MEDICARE

## 2025-05-09 VITALS — WEIGHT: 162.94 LBS | BODY MASS INDEX: 25.52 KG/M2

## 2025-05-09 DIAGNOSIS — M25.572 BILATERAL ANKLE PAIN, UNSPECIFIED CHRONICITY: Primary | ICD-10-CM

## 2025-05-09 DIAGNOSIS — M79.672 BILATERAL FOOT PAIN: Primary | ICD-10-CM

## 2025-05-09 DIAGNOSIS — M79.671 BILATERAL FOOT PAIN: Primary | ICD-10-CM

## 2025-05-09 DIAGNOSIS — M25.571 BILATERAL ANKLE PAIN, UNSPECIFIED CHRONICITY: Primary | ICD-10-CM

## 2025-05-09 DIAGNOSIS — M79.671 RIGHT FOOT PAIN: Primary | ICD-10-CM

## 2025-05-09 DIAGNOSIS — B07.0 PLANTAR WART: ICD-10-CM

## 2025-05-09 DIAGNOSIS — L98.9 SKIN LESION OF FOOT: ICD-10-CM

## 2025-05-09 PROCEDURE — 99999 PR PBB SHADOW E&M-EST. PATIENT-LVL III: CPT | Mod: PBBFAC,,, | Performed by: PODIATRIST

## 2025-05-09 NOTE — PROGRESS NOTES
[facility] - PODIATRY  [address]  [address]  [place] [address]  Dept: [phone]  Dept Fax: [phone]    Mariano Canales Jr., DPM     Assessment:   MDM    Coding  1. Right foot pain        2. Skin lesion of foot - Right Foot        3. Plantar wart - Right Foot            Plan:     Procedures    David was seen today for callouses.    Diagnoses and all orders for this visit:    Right foot pain    Skin lesion of foot - Right Foot    Plantar wart - Right Foot        - Pt seen evaluated and treated. Diagnosis as above reviewed  - Discussed surgical and conservative tx options  - All alternatives, risks, benefits of all tx options were discussed in detail  -after obtaining consent and marking and then performing timeout, I cleansed w/ alcohol prep pad the above mentioned hyperkeratosis which was then trimmed utilizing No 15 scapel, to a smooth base with pinpoint bleeding with out incident. Silver nitrate was used as needed. The skin lesion was then destroyed with application of cantherone and covered with occlusive dressing. Patient tolerated this well and reported comfort to the area.  -Warnings regarding pain and blister formation given  -OTC medications for pain  -pt instructed to keep lesion covered with bandaid+triple abx ointment - change QD as needed x 14-21 days  -Patient education regarding warts was given / Handout on warts was given  -OTC Salicylic acid to be applied daily by patient at home as needed  -rxs dispensed: none  -referrals: none  -WB: wbat      Follow up if symptoms worsen or fail to improve.    Subjective:      Patient ID: David Solomon is a 75 y.o. male.    Chief Complaint:   Chief Complaint   Patient presents with    Callouses       CC - skin lesion Patient presents to the clinic complaining of painful skin lesion on the right foot. Patient is inquiring about treatment options.    HPI    Last Podiatry Enc: Visit date not found  Last Enc w/  Me: Visit date not found    Outside reports reviewed: historical medical records.  Family hx: as below  Past Medical History:   Diagnosis Date    Cataract     Diabetes mellitus     Diabetic retinopathy     Hypertension      Past Surgical History:   Procedure Laterality Date    CATARACT EXTRACTION Right 03/27/2022    Dr. Ricketts    DESTRUCTION, INTRAOSSEOUS BASIVERTEBRAL NERVE, 1ST TWO BODIES, LUM/SAC N/A 2/19/2024    Procedure: DESTRUCTION,INTRAOSSEOUS BASIVERTEBRAL NERVE,1ST TWO BODIES,LUM/SAC;  Surgeon: Tomeka Lowry MD;  Location: [facility] OR;  Service: Pain Management;  Laterality: N/A;    EPIDURAL STEROID INJECTION INTO LUMBAR SPINE N/A 12/1/2023    Procedure: L5-S1 LESI;  Surgeon: Tomeka Lowry MD;  Location: [facility] PAIN MANAGEMENT;  Service: Pain Management;  Laterality: N/A;  oral 20 mins    EPIDURAL STEROID INJECTION INTO LUMBAR SPINE Bilateral 1/2/2024    Procedure: B/L L5-S1 TFESI;  Surgeon: Tyree Ochoa DO;  Location: [facility] PAIN MANAGEMENT;  Service: Pain Management;  Laterality: Bilateral;  20 mins    EPIDURAL STEROID INJECTION INTO LUMBAR SPINE Bilateral 3/15/2024    Procedure: B/L L4-5 TFESI;  Surgeon: Tomeka Lowry MD;  Location: [facility] PAIN MANAGEMENT;  Service: Pain Management;  Laterality: Bilateral;  20 mins    EPIDURAL STEROID INJECTION INTO LUMBAR SPINE N/A 11/15/2024    Procedure: L5-S1 (AIM LEFT);  Surgeon: Tomeka Lowry MD;  Location: [facility] PAIN MANAGEMENT;  Service: Pain Management;  Laterality: N/A;  20 mins  no AC    FUSION, SPINE, LUMBAR, TLIF, POSTERIOR APPROACH, USING PEDICLE SCREW N/A 8/27/2024    Procedure: FUSION, SPINE, LUMBAR, TLIF, POSTERIOR APPROACH, USING PEDICLE SCREW L4-5 (L);  Surgeon: Suresh Yeh MD;  Location: [facility] OR 2ND FLR;  Service: Neurosurgery;  Laterality: N/A;    INTRAOCULAR PROSTHESES INSERTION Right 3/27/2022    Procedure: INSERTION, IOL PROSTHESIS;  Surgeon: Divine Ricketts MD;  Location: [facility] OR 1ST FLR;  Service: Ophthalmology;  Laterality: Right;     INTRAOCULAR PROSTHESES INSERTION Left 5/15/2022    Procedure: INSERTION, IOL PROSTHESIS;  Surgeon: Divine Ricketts MD;  Location: [facility] OR [address] [place] [address];  Service: Ophthalmology;  Laterality: Left;    PHACOEMULSIFICATION OF CATARACT Right 3/27/2022    Procedure: PHACOEMULSIFICATION, CATARACT;  Surgeon: Divine Ricketts MD;  Location: [facility] OR [address] [place] [address];  Service: Ophthalmology;  Laterality: Right;    PHACOEMULSIFICATION OF CATARACT Left 5/15/2022    Procedure: PHACOEMULSIFICATION, CATARACT;  Surgeon: Divine Ricketts MD;  Location: [facility] OR [address] [place] [address];  Service: Ophthalmology;  Laterality: Left;    PIRIFORMIS BLOCK Left 12/20/2024    Procedure: LT Piriformis Inj;  Surgeon: Tomeka Lowry MD;  Location: [facility] PAIN MANAGEMENT;  Service: Pain Management;  Laterality: Left;  no sed-no ac     Family History   Problem Relation Name Age of Onset    No Known Problems Mother      No Known Problems Father      Melanoma Neg Hx       Current Medications[1]  Review of patient's allergies indicates:  No Known Allergies  Social History[2]    ROS    REVIEW OF SYSTEMS: Negative as documented below as well as positive findings in bold.       Constitutional  Respiratory  Gastrointestinal  Skin   - Fever - Cough - Heartburn - Rash   - Chills - Spit blood - Nausea - Itching   - Weight Loss - Shortness of breath - Vomiting - Nail pain   - Malaise/Fatigue - Wheezing - Abdominal Pain  Wound/Ulcer   - Weight Gain   - Blood in Stool  Poor wound healing       - Diarrhea          Cardiovascular  Genitourinary  Neurological  HEENT   - Chest Pain - Dysuria - Burning Sensation of feet - Headache   - Palpitations - Hematuria - Tingling / Paresthesia - Congestion   - Pain at night in legs - Flank Pain - Dizziness - Sore Throat   - Cramping   - Tremor - Blurred Vision   - Leg Swelling   - Sensory Change - Double Vision   - Dizzy when standing   - Speech Change - Eye Redness       - Focal Weakness - Dry Eyes       - Loss of Consciousness          Endocrine   Musculoskeletal  Psychiatric   - Cold intolerance - Muscle Pain - Depression   - Heat intolerance - Neck Pain - Insomnia   - Anemia - Joint Pain - Memory Loss   -  Easy bruising, bleeding - Heel pain - Anxiety      Toe Pain        Leg/Ankle/Foot Pain         Objective:     Wt 73.9 kg (162 lb 14.7 oz)   BMI 25.52 kg/m²   Vitals:    05/09/25 1350   Weight: 73.9 kg (162 lb 14.7 oz)   PainSc: 10-Worst pain ever       Physical Exam    General Appearance:   Patient appears well developed, well nourished  Patient appears stated age    Psychiatric:   Patient is oriented to time, place, and person.  Patient has appropriate mood and affect    Neck:  Trachea Midline  No visible masses    Respiratory/Ears:  No distress or labored breathing.  Able to differentiate between normal talking voice and whisper.  Able to follow commands    Eyes:  Visual Acuity intact  Lids and conjunctivae normal. No discoloration noted.    Foot Exam  Physical Exam  Ortho Exam  Ortho/SPM Exam  Physical Exam  Neurological Exam    R LE exam con't:  V:  DP 2/4, PT 2/4   CRT< 3s to all digits tested   Tibial and popliteal lymph nodes are w/o abnormality    N:  Patient displays normal ankle reflexes   SILT in SP/DP/T/Lillian/Saph distributions    Ortho: +Motor EHL/FHL/TA/GA   +TTP skin lesion(s)    Compartments soft/compressible. No pain on passive stretch of big toe. No calf  Pain.   no deformity noted on exam    Derm: skin intact, skin warm and dry, skin without induration, skin without ulcers, nails normal, +hyperkeratotic v verrucous lesion that breaks normal skin lines and has pinpoint bleeding upon debridement R foot    Imaging / Labs:      X-Ray Foot Complete 3 view Bilateral  Result Date: 5/9/2025  EXAMINATION: XR FOOT COMPLETE 3 VIEW BILATERAL CLINICAL HISTORY: Pain in right foot TECHNIQUE: Weightbearing AP, lateral, and oblique views of both feet were performed. COMPARISON: None FINDINGS: The bones are intact.  There is no evidence for acute fracture  or bone destruction.  There is no evidence for dislocation.  No bony erosions are identified.  There are mild degenerative changes within the small joints of the foot.  There is mild hallux valgus deformity present on the right.  There is atherosclerotic calcification identified within the small arteries of the feet.  No radiopaque soft tissue foreign bodies are identified.     No evidence for acute fracture, bone destruction, or dislocation. Mild degenerative changes. Mild hallux valgus deformity on the right. Atherosclerosis. Electronically signed by: Bertrand Balbuena MD Date:    05/09/2025 Time:    13:58    X-Ray Ankle Complete 3 View Bilateral  Result Date: 5/9/2025  EXAMINATION: XR ANKLE COMPLETE 3 VIEW BILATERAL CLINICAL HISTORY: Pain in right ankle and joints of right foot TECHNIQUE: Weightbearing AP, lateral and oblique views of both ankles were performed. COMPARISON: None FINDINGS: The bones are intact.  There is no evidence for acute fracture or bone destruction.  There is no evidence for dislocation.  No periarticular bony erosions are identified.  There is atherosclerotic calcification identified at the level of the ankle and hindfoot.  No radiopaque soft tissue foreign bodies are identified.     No evidence for acute fracture, bone destruction, or dislocation. Atherosclerosis. Electronically signed by: Bertrand Balbuena MD Date:    05/09/2025 Time:    13:40        Note: This was dictated using a computer transcription program. Although proofread, it may contain computer transcription errors and phonetic errors. Other human proofreading errors may also exist. Corrections may be performed at a later time. Please contact us for any clarification if needed.    Maraino Canales DPM  [facility]         [1]   Current Outpatient Medications   Medication Sig Dispense Refill    amitriptyline (ELAVIL) 10 MG tablet TAKE 1 TABLET BY MOUTH ONCE DAILY AT NIGHT AT BEDTIME      atorvastatin  (LIPITOR) 40 MG tablet Take 40 mg by mouth every evening.      cetirizine (ZYRTEC) 10 MG tablet Take 1 tablet (10 mg total) by mouth once daily. 30 tablet 11    cyanocobalamin 1,000 mcg/mL injection SMARTSI Milliliter(s) IM Twice Monthly      diltiaZEM (CARDIZEM LA) 180 mg 24 hr tablet Take 180 mg by mouth once daily.      eplerenone (INSPRA) 50 MG Tab       GLUCAGON EMERGENCY KIT, HUMAN, 1 mg injection Inject 1 mL into the muscle.      HUMALOG KWIKPEN INSULIN 100 unit/mL pen Inject into the skin.      hydroCHLOROthiazide (HYDRODIURIL) 12.5 MG Tab Take 12.5 mg by mouth once daily.      HYDROcodone-acetaminophen (NORCO) 7.5-325 mg per tablet Take 1 tablet by mouth.      insulin glargine (LANTUS) 100 unit/mL injection Inject 25 Units into the skin once daily.      irbesartan (AVAPRO) 75 MG tablet Take 75 mg by mouth every evening.      oxyCODONE (ROXICODONE) 10 mg Tab immediate release tablet Take 1 tablet (10 mg total) by mouth every 6 (six) hours as needed for Pain. 28 tablet 0    oxyCODONE-acetaminophen (PERCOCET)  mg per tablet Take 1 tablet by mouth every 8 (eight) hours as needed for Pain. 21 tablet 0    tamsulosin (FLOMAX) 0.4 mg Cap TAKE 1 CAPSULE BY MOUTH EVERY DAY 30 MIN FOLLOWING THE SAME MEAL EACH DAY      amLODIPine (NORVASC) 10 MG tablet Take 10 mg by mouth.      celecoxib (CELEBREX) 200 MG capsule Take 1 capsule (200 mg total) by mouth once daily. 30 capsule 0    docusate sodium (COLACE) 100 MG capsule Take 1 capsule (100 mg total) by mouth 2 (two) times daily as needed for Constipation. 30 capsule 0    ondansetron (ZOFRAN-ODT) 4 MG TbDL Dissolve 2 tablets (8 mg total) by mouth every 8 (eight) hours as needed. 30 tablet 0    pregabalin (LYRICA) 50 MG capsule Take 1 capsule (50 mg total) by mouth 3 (three) times daily. 90 capsule 2    sulfamethoxazole-trimethoprim 800-160mg (BACTRIM DS) 800-160 mg Tab Take 1 tablet by mouth 2 (two) times daily. 14 tablet 0     No current facility-administered  medications for this visit.     Facility-Administered Medications Ordered in Other Visits   Medication Dose Route Frequency Provider Last Rate Last Admin    phenylephrine HCL 2.5% ophthalmic solution 1 drop  1 drop Right Eye On Call Procedure Divine Ricketts MD   1 drop at 03/27/22 0813    phenylephrine HCL 2.5% ophthalmic solution 1 drop  1 drop Left Eye On Call Procedure Divine Ricketts MD   1 drop at 05/15/22 0948    proparacaine 0.5 % ophthalmic solution 1 drop  1 drop Right Eye On Call Procedure Divine Ricketts MD   1 drop at 03/27/22 0802    proparacaine 0.5 % ophthalmic solution 1 drop  1 drop Left Eye On Call Procedure Divine Ricketts MD   1 drop at 05/15/22 0936    tropicamide 1% ophthalmic solution 1 drop  1 drop Right Eye On Call Procedure Divine Ricketts MD   1 drop at 03/27/22 0813    tropicamide 1% ophthalmic solution 1 drop  1 drop Left Eye On Call Procedure Divine Ricketts MD   1 drop at 05/15/22 0947   [2]   Social History  Socioeconomic History    Marital status:    Tobacco Use    Smoking status: Never    Smokeless tobacco: Never   Substance and Sexual Activity    Alcohol use: No    Drug use: No    Sexual activity: Not Currently     Social Drivers of Health     Financial Resource Strain: Low Risk  (3/31/2025)    Overall Financial Resource Strain (CARDIA)     Difficulty of Paying Living Expenses: Not hard at all   Food Insecurity: No Food Insecurity (3/31/2025)    Hunger Vital Sign     Worried About Running Out of Food in the Last Year: Never true     Ran Out of Food in the Last Year: Never true   Transportation Needs: No Transportation Needs (3/31/2025)    PRAPARE - Transportation     Lack of Transportation (Medical): No     Lack of Transportation (Non-Medical): No   Physical Activity: Inactive (3/31/2025)    Exercise Vital Sign     Days of Exercise per Week: 0 days     Minutes of Exercise per Session: 0 min   Stress: No Stress Concern Present (3/31/2025)    [place]  [place] [facility] [employer] [facility] - Occupational Stress Questionnaire     Feeling of Stress : Only a little   Housing Stability: Low Risk  (3/31/2025)    Housing Stability Vital Sign     Unable to Pay for Housing in the Last Year: No     Number of Times Moved in the Last Year: 0     Homeless in the Last Year: No

## 2025-05-15 ENCOUNTER — PATIENT MESSAGE (OUTPATIENT)
Dept: PAIN MEDICINE | Facility: CLINIC | Age: 75
End: 2025-05-15
Payer: MEDICARE

## 2025-05-16 ENCOUNTER — TELEPHONE (OUTPATIENT)
Dept: PAIN MEDICINE | Facility: CLINIC | Age: 75
End: 2025-05-16
Payer: MEDICARE

## 2025-05-16 DIAGNOSIS — G57.81: Primary | ICD-10-CM

## 2025-05-27 ENCOUNTER — CLINICAL SUPPORT (OUTPATIENT)
Dept: PAIN MEDICINE | Facility: CLINIC | Age: 75
End: 2025-05-27
Payer: MEDICARE

## 2025-05-27 VITALS
SYSTOLIC BLOOD PRESSURE: 172 MMHG | HEIGHT: 67 IN | BODY MASS INDEX: 25.64 KG/M2 | DIASTOLIC BLOOD PRESSURE: 81 MMHG | HEART RATE: 65 BPM | WEIGHT: 163.38 LBS

## 2025-05-27 DIAGNOSIS — G57.81: Primary | ICD-10-CM

## 2025-05-27 PROCEDURE — 99999 PR PBB SHADOW E&M-EST. PATIENT-LVL II: CPT | Mod: PBBFAC,,, | Performed by: STUDENT IN AN ORGANIZED HEALTH CARE EDUCATION/TRAINING PROGRAM

## 2025-05-27 PROCEDURE — 99499 UNLISTED E&M SERVICE: CPT | Mod: S$GLB,,, | Performed by: STUDENT IN AN ORGANIZED HEALTH CARE EDUCATION/TRAINING PROGRAM

## 2025-05-27 PROCEDURE — 64486 TAP BLOCK UNIL BY INJECTION: CPT | Mod: RT,S$GLB,, | Performed by: STUDENT IN AN ORGANIZED HEALTH CARE EDUCATION/TRAINING PROGRAM

## 2025-05-27 RX ORDER — TRIAMCINOLONE ACETONIDE 40 MG/ML
40 INJECTION, SUSPENSION INTRA-ARTICULAR; INTRAMUSCULAR
Status: COMPLETED | OUTPATIENT
Start: 2025-05-27 | End: 2025-05-27

## 2025-05-27 RX ADMIN — TRIAMCINOLONE ACETONIDE 40 MG: 40 INJECTION, SUSPENSION INTRA-ARTICULAR; INTRAMUSCULAR at 02:05

## 2025-05-27 NOTE — PROGRESS NOTES
Patient Name: David Solomon  MRN: [id number]    INFORMED CONSENT: The procedure, risks, benefits and options were discussed with patient. There are no contraindications to the procedure. The patient expressed understanding and agreed to proceed. The personnel performing the procedure was discussed. I verify that I personally obtained David's consent prior to the start of the procedure and the signed consent can be found on the patient's chart.    Procedure Date: 05/27/2025    Anesthesia: Topical    Pre Procedure diagnosis:   1. Irritation of right ilioinguinal nerve        Post-Procedure diagnosis: same      Sedation: None    PROCEDURE: RIGHT TRANSVERSUS ABDOMINIS PLANE (ILIOINGUINAL/ILIOHYPOGASTRIC) BLOCK WITH ULTRASOUND GUIDANCE    DESCRIPTION OF PROCEDURE: The patient was brought to the procedure room.  The patient was placed in the supine position on the stretcher.    The  abdomen was prepped with chlorhexidine three times and draped into sterile field. Ultrasound guidance was used to locate bowel, external oblique, internal oblique, and transversus abdominis muscles. A 25-gauge, 3 1/2 inch spinal needle was used to engage the abdominal wall muscles. After negative aspiration of blood 10 cc Bupivacaine 0.5% and 40 mg kenalog was injected into the fascial plane between the transversus abdominis and internal oblique muscles. There were no complications.     Blood Loss: Nill  Specimen: None    Tomeka Lowry MD

## 2025-09-02 ENCOUNTER — OFFICE VISIT (OUTPATIENT)
Dept: PAIN MEDICINE | Facility: CLINIC | Age: 75
End: 2025-09-02
Payer: MEDICARE

## 2025-09-02 ENCOUNTER — TELEPHONE (OUTPATIENT)
Dept: PAIN MEDICINE | Facility: CLINIC | Age: 75
End: 2025-09-02

## 2025-09-02 VITALS
DIASTOLIC BLOOD PRESSURE: 77 MMHG | HEIGHT: 67 IN | HEART RATE: 72 BPM | SYSTOLIC BLOOD PRESSURE: 143 MMHG | WEIGHT: 158.94 LBS | BODY MASS INDEX: 24.94 KG/M2

## 2025-09-02 DIAGNOSIS — G57.02 PIRIFORMIS SYNDROME OF LEFT SIDE: Primary | ICD-10-CM

## 2025-09-02 DIAGNOSIS — M79.18 MYOFASCIAL PAIN SYNDROME: ICD-10-CM

## 2025-09-02 DIAGNOSIS — M79.18 MYALGIA, OTHER SITE: ICD-10-CM

## 2025-09-02 PROCEDURE — 3008F BODY MASS INDEX DOCD: CPT | Mod: CPTII,S$GLB,, | Performed by: STUDENT IN AN ORGANIZED HEALTH CARE EDUCATION/TRAINING PROGRAM

## 2025-09-02 PROCEDURE — 99214 OFFICE O/P EST MOD 30 MIN: CPT | Mod: S$GLB,,, | Performed by: STUDENT IN AN ORGANIZED HEALTH CARE EDUCATION/TRAINING PROGRAM

## 2025-09-02 PROCEDURE — 1160F RVW MEDS BY RX/DR IN RCRD: CPT | Mod: CPTII,S$GLB,, | Performed by: STUDENT IN AN ORGANIZED HEALTH CARE EDUCATION/TRAINING PROGRAM

## 2025-09-02 PROCEDURE — G2211 COMPLEX E/M VISIT ADD ON: HCPCS | Mod: S$GLB,,, | Performed by: STUDENT IN AN ORGANIZED HEALTH CARE EDUCATION/TRAINING PROGRAM

## 2025-09-02 PROCEDURE — 3288F FALL RISK ASSESSMENT DOCD: CPT | Mod: CPTII,S$GLB,, | Performed by: STUDENT IN AN ORGANIZED HEALTH CARE EDUCATION/TRAINING PROGRAM

## 2025-09-02 PROCEDURE — 99999 PR PBB SHADOW E&M-EST. PATIENT-LVL IV: CPT | Mod: PBBFAC,,, | Performed by: STUDENT IN AN ORGANIZED HEALTH CARE EDUCATION/TRAINING PROGRAM

## 2025-09-02 PROCEDURE — 1101F PT FALLS ASSESS-DOCD LE1/YR: CPT | Mod: CPTII,S$GLB,, | Performed by: STUDENT IN AN ORGANIZED HEALTH CARE EDUCATION/TRAINING PROGRAM

## 2025-09-02 PROCEDURE — 3077F SYST BP >= 140 MM HG: CPT | Mod: CPTII,S$GLB,, | Performed by: STUDENT IN AN ORGANIZED HEALTH CARE EDUCATION/TRAINING PROGRAM

## 2025-09-02 PROCEDURE — 1159F MED LIST DOCD IN RCRD: CPT | Mod: CPTII,S$GLB,, | Performed by: STUDENT IN AN ORGANIZED HEALTH CARE EDUCATION/TRAINING PROGRAM

## 2025-09-02 PROCEDURE — 3078F DIAST BP <80 MM HG: CPT | Mod: CPTII,S$GLB,, | Performed by: STUDENT IN AN ORGANIZED HEALTH CARE EDUCATION/TRAINING PROGRAM

## 2025-09-02 PROCEDURE — 1125F AMNT PAIN NOTED PAIN PRSNT: CPT | Mod: CPTII,S$GLB,, | Performed by: STUDENT IN AN ORGANIZED HEALTH CARE EDUCATION/TRAINING PROGRAM

## 2025-09-02 PROCEDURE — 4010F ACE/ARB THERAPY RXD/TAKEN: CPT | Mod: CPTII,S$GLB,, | Performed by: STUDENT IN AN ORGANIZED HEALTH CARE EDUCATION/TRAINING PROGRAM

## (undated) DEVICE — SOL BETADINE 5%

## (undated) DEVICE — ELECTRODE REM PLYHSV RETURN 9

## (undated) DEVICE — DRAPE STERI-DRAPE 1000 17X11IN

## (undated) DEVICE — SPHERE NDI PASSIVE

## (undated) DEVICE — BIT DRILL REAM GPS 3.5MM

## (undated) DEVICE — SUT VICRYL+ 1 CT1 18IN

## (undated) DEVICE — DRESSING TRANS 4X4 TEGADERM

## (undated) DEVICE — DRAPE C-ARMOR EQUIPMENT COVER

## (undated) DEVICE — KIT EVACUATOR 3-SPRING 1/8 DRN

## (undated) DEVICE — DRAPE C-ARM ELAS CLIP 42X120IN

## (undated) DEVICE — TUBE FRAZIER 5MM 2FT SOFT TIP

## (undated) DEVICE — GOWN SURGICAL X-LARGE

## (undated) DEVICE — APPLICATOR CHLORAPREP ORN 26ML

## (undated) DEVICE — DRAPE OPHTHALMIC 48X62 FEN

## (undated) DEVICE — SOL PVP-I SCRUB 7.5% 4OZ

## (undated) DEVICE — SPIKE LOW PROFILE QUATTRO SH

## (undated) DEVICE — SPONGE LAP 4X18 PREWASHED

## (undated) DEVICE — GLOVE BIOGEL ECLIPSE SZ 6.5

## (undated) DEVICE — SHIELD EYE CLEAR ACRYLIC UNIV

## (undated) DEVICE — NDL 20GX1-1/2IN IB

## (undated) DEVICE — SPONGE COTTON TRAY 4X4IN

## (undated) DEVICE — PAD EYE OVAL CNTOUR 1.62X2.62

## (undated) DEVICE — GLOVE BIOGEL ECLIPSE SZ 8

## (undated) DEVICE — SUT VICRYL PLUS 2-0 CT1 18

## (undated) DEVICE — KIT GREY EYE

## (undated) DEVICE — SPONGE LAP 18X18 PREWASHED

## (undated) DEVICE — NDL HYPO 27G X 1 1/2

## (undated) DEVICE — SPONGE GAUZE 16PLY 4X4

## (undated) DEVICE — SOL WATER STRL IRR 1000ML

## (undated) DEVICE — KIT SURGIFLO HEMOSTATIC MATRIX

## (undated) DEVICE — DRESSING ADH ISLAND 3.6 X 14

## (undated) DEVICE — SEE MEDLINE ITEM 157131

## (undated) DEVICE — BANDAGE ADHESIVE

## (undated) DEVICE — SYR SLIP TIP 1CC

## (undated) DEVICE — TRAY CATH 1-LYR URIMTR 16FR

## (undated) DEVICE — NDL SPINAL 18GX3.5 SPINOCAN

## (undated) DEVICE — SUTURE STRATAFIX PGA PCL 3-0

## (undated) DEVICE — GUIDE POST LP QUATTRO SHORT

## (undated) DEVICE — CORD BIPOLAR 12 FOOT

## (undated) DEVICE — SYR 10CC LUER LOCK

## (undated) DEVICE — DRESSING AQUACEL FOAM 5 X 5

## (undated) DEVICE — DRAPE CORETEMP FLD WRM 56X62IN

## (undated) DEVICE — TRAY NEURO OMC

## (undated) DEVICE — STRIP MEDI WND CLSR 1X5IN

## (undated) DEVICE — BUR BONE CUT MICRO TPS 3X3.8MM

## (undated) DEVICE — COVER LIGHT HANDLE 80/CA

## (undated) DEVICE — DRILL BIT SURG GPS HI SPD 4MM

## (undated) DEVICE — DRAPE LAP T SHT W/ INSTR PAD

## (undated) DEVICE — DRAPE EXCELSIUS GPS C-ARM

## (undated) DEVICE — DRESSING MEPILEX BORDER 4 X 4

## (undated) DEVICE — SUT 2/0 30IN SILK BLK BRAI

## (undated) DEVICE — CONTAINER SPECIMEN OR STER 4OZ

## (undated) DEVICE — BOWL STERILE LARGE 32OZ

## (undated) DEVICE — TOWEL OR DISP STRL BLUE 4/PK

## (undated) DEVICE — GLOVE 7.5 PROTEXIS PI MICRO

## (undated) DEVICE — NDL HYPO REG 25G X 1 1/2

## (undated) DEVICE — INTRACEPT ACCESS INSTRUMENTS

## (undated) DEVICE — SUT 2-0 SILK 30IN BLK BRAID

## (undated) DEVICE — SYS CLSR DERMABOND PRINEO 22CM

## (undated) DEVICE — DRESSING TRANS 2X2 TEGADERM

## (undated) DEVICE — DRAPE TOP 53X102IN

## (undated) DEVICE — NDL FLTR 5MCRN BLNT TIP 18GX1

## (undated) DEVICE — DRESSING AQUACEL FOAM 3 X 3

## (undated) DEVICE — SYR IRRIGATION BULB STER 60ML

## (undated) DEVICE — FASTNER CATH PERC DRAINAGE

## (undated) DEVICE — SHEILD PLASTIC EYE

## (undated) DEVICE — MARKER SKIN RULER STERILE

## (undated) DEVICE — DRAPE ABDOMINAL TIBURON 14X11

## (undated) DEVICE — TIP YANKAUERS BULB NO VENT

## (undated) DEVICE — SOL SALINE STER BOTTLE 500ML

## (undated) DEVICE — COVER BACK TBL HD 2-TIER 72IN

## (undated) DEVICE — Device

## (undated) DEVICE — DRESSING AQUACEL SACRAL 9 X 9

## (undated) DEVICE — INTRACEPT RF PROBE

## (undated) DEVICE — COVER SNAP KAP 30

## (undated) DEVICE — KIT SPINAL PATIENT CARE JACK

## (undated) DEVICE — NDL SPINAL SPINOCAN 22GX3.5

## (undated) DEVICE — BLADE 4IN EDGE INSULATED

## (undated) DEVICE — BLADE MILL+ BONE MEDIUM DISP

## (undated) DEVICE — COVER TABLE HVY DTY 60X90IN